# Patient Record
Sex: FEMALE | Race: WHITE | Employment: OTHER | URBAN - NONMETROPOLITAN AREA
[De-identification: names, ages, dates, MRNs, and addresses within clinical notes are randomized per-mention and may not be internally consistent; named-entity substitution may affect disease eponyms.]

---

## 2017-01-04 ENCOUNTER — CARE COORDINATION (OUTPATIENT)
Dept: CARE COORDINATION | Age: 72
End: 2017-01-04

## 2017-01-10 ENCOUNTER — CARE COORDINATION (OUTPATIENT)
Dept: CARE COORDINATION | Age: 72
End: 2017-01-10

## 2017-02-08 ENCOUNTER — CARE COORDINATION (OUTPATIENT)
Dept: CARE COORDINATION | Age: 72
End: 2017-02-08

## 2017-03-29 RX ORDER — MONTELUKAST SODIUM 10 MG/1
TABLET ORAL
Qty: 90 TABLET | Refills: 3 | Status: SHIPPED | OUTPATIENT
Start: 2017-03-29 | End: 2018-03-19 | Stop reason: SDUPTHER

## 2017-03-29 RX ORDER — LEVOTHYROXINE SODIUM 0.15 MG/1
TABLET ORAL
Qty: 90 TABLET | Refills: 3 | Status: SHIPPED | OUTPATIENT
Start: 2017-03-29 | End: 2018-03-19 | Stop reason: SDUPTHER

## 2017-03-29 RX ORDER — TEMAZEPAM 15 MG/1
15 CAPSULE ORAL NIGHTLY PRN
Qty: 90 CAPSULE | Refills: 1 | Status: SHIPPED | OUTPATIENT
Start: 2017-03-29 | End: 2017-12-05

## 2017-10-18 ENCOUNTER — OFFICE VISIT (OUTPATIENT)
Dept: PULMONOLOGY | Age: 72
End: 2017-10-18
Payer: MEDICARE

## 2017-10-18 VITALS
BODY MASS INDEX: 40.01 KG/M2 | WEIGHT: 264 LBS | SYSTOLIC BLOOD PRESSURE: 120 MMHG | RESPIRATION RATE: 16 BRPM | OXYGEN SATURATION: 98 % | HEIGHT: 68 IN | HEART RATE: 88 BPM | DIASTOLIC BLOOD PRESSURE: 80 MMHG

## 2017-10-18 DIAGNOSIS — G47.33 OSA ON CPAP: ICD-10-CM

## 2017-10-18 DIAGNOSIS — Z99.89 OSA ON CPAP: ICD-10-CM

## 2017-10-18 DIAGNOSIS — E66.01 MORBID OBESITY WITH BMI OF 40.0-44.9, ADULT (HCC): ICD-10-CM

## 2017-10-18 PROCEDURE — 99213 OFFICE O/P EST LOW 20 MIN: CPT | Performed by: PHYSICIAN ASSISTANT

## 2017-10-18 RX ORDER — MELOXICAM 7.5 MG/1
7.5 TABLET ORAL DAILY
COMMUNITY
End: 2018-05-16 | Stop reason: SDUPTHER

## 2017-10-18 NOTE — PROGRESS NOTES
[facility]      Seth Eng                                         [id number]  10/18/2017   Chief Complaint   Patient presents with    Sleep Apnea     ARTIS on CPAP-  15 mth f/u with D/L        Pt of Dr. Anh Greenberg    PAP Download:   Tamra Fajardo or initial  AHI: 27.9     Date of initial study: 3/22/16    [x] Compliant  100%   []  Noncompliant 0 %     PAP Type CPAP   Level  10 cmH2O   Avg Hrs/Day 8:52  AHI: 1.2   Recorded compliance dates ,9/18/17 to 10/17/17  Machine/Mfg: ResMed  Interface: Dream Wear    Provider:  [x][facility]  [][facility]  [][facility]  [][facility]         [][facility] []Other:   Neck Size: 16.75   Mallampati 4  ESS:  4    Here is a scan of the most recent download:        Presentation:   Ayla Jiang presents for sleep medicine follow up for obstructive sleep apnea  Since the last visit, yAla Jiang is doing reasonably well with their sleep machine. Other comments: She loves her machine. Equipment issues: The pressure is  acceptable, the mask is acceptable and she  is  using the humidity. Progress History:   Since last visit any new medical issues? Yes TKA  New ER or hospitlal visits? No  Any new or changes in medicines? No  Any new sleep medicines? No    Sleep issues:  Do you feel better? Yes  More rested? Yes   Better concentration?  yes      Past Medical History:   Diagnosis Date    Allergic rhinitis     Chronic kidney disease     Claustrophobia 2/24/2016    Hyperlipidemia     Obesity     ARTIS on CPAP        Past Surgical History:   Procedure Laterality Date    COLONOSCOPY  2006   Roger Golden Left     Dr Lesa Zavaleta  LMT       Social History   Substance Use Topics    Smoking status: Former Smoker     Types: Cigarettes    Smokeless tobacco: Never Used      Comment: quit 1995    Alcohol use Yes      Comment: socially       Allergies   Allergen Reactions    Naproxen Swelling       Current Outpatient Prescriptions   Medication Sig Dispense Refill    Triamcinolone Acetonide (NASACORT AQ NA) by Nasal route      meloxicam (MOBIC) 7.5 MG tablet Take 7.5 mg by mouth daily      montelukast (SINGULAIR) 10 MG tablet TAKE 1 TABLET BY MOUTH NIGHTLY 90 tablet 3    temazepam (RESTORIL) 15 MG capsule Take 1 capsule by mouth nightly as needed for Sleep 90 capsule 1    levothyroxine (SYNTHROID) 150 MCG tablet TAKE 1 TABLET BY MOUTH EVERY DAY 90 tablet 3    Fluocinolone Acetonide (DERMA-SMOOTHE/FS BODY) 0.01 % OIL Apply 5 gtts BID both ears (Patient taking differently: as needed Apply 5 gtts BID both ears) 1 Bottle 2    benzonatate (TESSALON PERLES) 100 MG capsule Take 1 capsule by mouth 3 times daily as needed for Cough 30 capsule 0    vitamin B-12 (CYANOCOBALAMIN) 500 MCG tablet Take 500 mcg by mouth daily Indications: 2500mg a day      Niacin (VITAMIN B-3 PO) Take by mouth      Omega-3 Fatty Acids (FISH OIL) 1200 MG CAPS Take by mouth      Magnesium 500 MG CAPS Take by mouth      Cholecalciferol (VITAMIN D3) 83206 UNITS CAPS Take 10,000 Units by mouth daily.  Misc Natural Product Nasal (PONARIS) SOLN by Nasal route.  therapeutic multivitamin-minerals (THERAGRAN-M) tablet Take 1 tablet by mouth daily.  aspirin 325 MG tablet Take 325 mg by mouth daily      rivaroxaban (XARELTO) 10 MG TABS tablet Take 10 mg by mouth daily      HYDROcodone-acetaminophen (NORCO) 5-325 MG per tablet Take 1 tablet by mouth every 6 hours as needed for Pain .  traMADol (ULTRAM) 50 MG tablet Take 1 tablet by mouth nightly as needed for Pain 15 tablet 0    vitamin E 400 UNIT capsule Take 400 Units by mouth daily      ibuprofen (ADVIL;MOTRIN) 200 MG tablet Take 400 mg by mouth every 8 hours as needed. No current facility-administered medications for this visit.         Family History   Problem Relation Age of Onset    Dementia Mother     Diabetes Father         Review of Systems -   General ROS: gained 8 lbs over 1 year  ENT ROS: negative for - nasal congestion, oral lesions or sore throat  Hematological and Lymphatic ROS: negative  Endocrine ROS: negative  Respiratory ROS: no cough, shortness of breath, or wheezing  Cardiovascular ROS: no chest pain or dyspnea on exertion  Gastrointestinal ROS: no abdominal pain, change in bowel habits, or black or bloody stools  Musculoskeletal ROS: negative  Neurological ROS: negative    Physical Exam:    BMI:  Body mass index is 40.74 kg/m². Wt Readings from Last 3 Encounters:   10/18/17 264 lb (119.7 kg)   11/21/16 256 lb 12.8 oz (116.5 kg)   07/22/16 251 lb (113.9 kg)     Vitals: /80   Pulse 88   Resp 16   Ht 5' 7.5\" (1.715 m)   Wt 264 lb (119.7 kg)   LMP  (Exact Date)   SpO2 98% Comment: R/A at rest  BMI 40.74 kg/m²       General appearance: alert and oriented to person, place and time, well-developed and well-nourished, in no acute distress  Nose: Nares normal. Septum midline. Mucosa normal. No drainage or sinus tenderness. Oropharynx:  negative  Lungs: clear to auscultation bilaterally  Heart: regular rate and rhythm, S1, S2 normal, no murmur, click, rub or gallop  Extremities: extremities normal, atraumatic, no cyanosis or edema  Neurologic: Mental status: Alert, oriented, thought content appropriate      ASSESSMENT/DIAGNOSIS    1. ARTIS on CPAP              Plan   Do you need any equipment today? Yes update    - She  was advised to continue current positive airway pressure therapy with above described pressure. - She  advised to keep good compliance with current recommended pressure to get optimal results and clinical improvement  - Recommend 7-9 hours of sleep with PAP  - She was advised to call [employer] regarding supplies if needed. - She call my office for earlier appointment if needed for worsening of sleep symptoms.   - She was instructed on weight loss  - Jacob Gamez was educated about my impression and plan. Patient verbalizes understanding.   We will see Jacob Orellana back in: 1 year with download    Maricel Torres

## 2017-12-05 ENCOUNTER — OFFICE VISIT (OUTPATIENT)
Dept: SURGERY | Age: 72
End: 2017-12-05

## 2017-12-05 VITALS
TEMPERATURE: 98.8 F | BODY MASS INDEX: 37.89 KG/M2 | HEART RATE: 86 BPM | OXYGEN SATURATION: 93 % | SYSTOLIC BLOOD PRESSURE: 124 MMHG | HEIGHT: 68 IN | WEIGHT: 250 LBS | RESPIRATION RATE: 20 BRPM | DIASTOLIC BLOOD PRESSURE: 66 MMHG

## 2017-12-05 DIAGNOSIS — Z12.11 COLON CANCER SCREENING: Primary | ICD-10-CM

## 2017-12-05 PROCEDURE — PREOPEXAM PRE-OP EXAM: Performed by: SURGERY

## 2017-12-05 ASSESSMENT — ENCOUNTER SYMPTOMS
VOICE CHANGE: 0
EYE REDNESS: 0
WHEEZING: 0
DIARRHEA: 0
SINUS PRESSURE: 0
BLOOD IN STOOL: 0
PHOTOPHOBIA: 0
EYE PAIN: 0
TROUBLE SWALLOWING: 0
CHOKING: 0
VOMITING: 0
RECTAL PAIN: 0
RHINORRHEA: 0
BACK PAIN: 0
EYE DISCHARGE: 0
FACIAL SWELLING: 0
SHORTNESS OF BREATH: 0
SORE THROAT: 0
ANAL BLEEDING: 0
NAUSEA: 0
EYE ITCHING: 0
ABDOMINAL PAIN: 0
ABDOMINAL DISTENTION: 0
COUGH: 0
CHEST TIGHTNESS: 0
COLOR CHANGE: 0
APNEA: 0
CONSTIPATION: 0
STRIDOR: 0

## 2017-12-05 NOTE — LETTER
[facility]  Luann Puente. Carlota GennyAlfonso  Phone- [phone]  Fax [phone]    Pt Name: Rolo Tran  Medical Record Number: [id number]  Date of Birth 1945   Today's Date: 12/5/2017    Jasmin Cheng was evaluated in the office today. My assessment and plans are listed below. ASSESSMENT        1. Colon cancer screening  COLONOSCOPY W/ OR W/O BIOPSY     Past Medical History:   Diagnosis Date    Allergic rhinitis     Chronic kidney disease     Claustrophobia 2/24/2016    Hyperlipidemia     Obesity     ARTIS on CPAP          PLANS:      1. Schedule Eva for colonoscopy with possible biopsy/polypectomy  2. Potential diagnostic/therapeutic options and alternatives were discussed with the patient in the office. Potential risks of bleeding, infection, perforation and missed lesions was reviewed. Potential for biopsy and/or polypectomy was discussed. We also discussed the use of IV sedation and colon preparation instructions. The patient was given an opportunity to ask questions. Once answered, the patient was agreeable to proceed with the procedure. 3. Status: Outpatient in endoscopy  4. Planned anesthesia: IV sedation provided by myself  5. Perioperative bowel preparation with Miralax and Dulcolax. Instructions given and questions answered. If I can provide any additional assistance or you have any concerns, please feel free to contact me. Thank you for allowing to participate in the care of your patients. Sincerely,      Luann Puente.  Alfonso Malagon

## 2017-12-05 NOTE — PROGRESS NOTES
Subjective:      Patient ID: Brandin Martin is a 71 y.o. female. Chief Complaint   Patient presents with    Surgical Consult     Est Pt- Colonoscopy Screening- Last one 2007       HPI    Review of Systems   Constitutional: Negative for activity change, appetite change, chills, diaphoresis, fatigue, fever and unexpected weight change. HENT: Negative for congestion, dental problem, drooling, ear discharge, ear pain, facial swelling, hearing loss, mouth sores, nosebleeds, postnasal drip, rhinorrhea, sinus pressure, sneezing, sore throat, tinnitus, trouble swallowing and voice change. Eyes: Negative for photophobia, pain, discharge, redness, itching and visual disturbance. Respiratory: Negative for apnea, cough, choking, chest tightness, shortness of breath, wheezing and stridor. Cardiovascular: Negative for chest pain, palpitations and leg swelling. Gastrointestinal: Negative for abdominal distention, abdominal pain, anal bleeding, blood in stool, constipation, diarrhea, nausea, rectal pain and vomiting. Endocrine: Negative for cold intolerance, heat intolerance, polydipsia, polyphagia and polyuria. Genitourinary: Negative for decreased urine volume, difficulty urinating, dysuria, enuresis, flank pain, frequency, genital sores, hematuria and urgency. Musculoskeletal: Negative for arthralgias, back pain, gait problem, joint swelling, myalgias, neck pain and neck stiffness. Skin: Negative for color change, pallor, rash and wound. Allergic/Immunologic: Negative for environmental allergies, food allergies and immunocompromised state. Neurological: Negative for dizziness, tremors, seizures, syncope, facial asymmetry, speech difficulty, weakness, light-headedness, numbness and headaches. Hematological: Negative for adenopathy. Does not bruise/bleed easily.    Psychiatric/Behavioral: Negative for agitation, behavioral problems, confusion, decreased concentration, dysphoric mood, hallucinations,

## 2017-12-05 NOTE — PROGRESS NOTES
Review of Systems  Constitutional: Negative for activity change, appetite change, chills, diaphoresis, fatigue, fever and unexpected weight change. HENT: Negative for congestion, dental problem, drooling, ear discharge, ear pain, facial swelling, hearing loss, mouth sores, nosebleeds, postnasal drip, rhinorrhea, sinus pressure, sneezing, sore throat, tinnitus, trouble swallowing and voice change. Eyes: Negative for photophobia, pain, discharge, redness, itching and visual disturbance. Respiratory: Negative for apnea, cough, choking, chest tightness, shortness of breath, wheezing and stridor. Cardiovascular: Negative for chest pain, palpitations and leg swelling. Gastrointestinal: Negative for abdominal distention, abdominal pain, anal bleeding, blood in stool, constipation, diarrhea, nausea, rectal pain and vomiting. Endocrine: Negative for cold intolerance, heat intolerance, polydipsia, polyphagia and polyuria. Genitourinary: Negative for decreased urine volume, difficulty urinating, dysuria, enuresis, flank pain, frequency, genital sores, hematuria and urgency. Musculoskeletal: Negative for arthralgias, back pain, gait problem, joint swelling, myalgias, neck pain and neck stiffness. Skin: Negative for color change, pallor, rash and wound. Allergic/Immunologic: Negative for environmental allergies, food allergies and immunocompromised state. Neurological: Negative for dizziness, tremors, seizures, syncope, facial asymmetry, speech difficulty, weakness, light-headedness, numbness and headaches. Hematological: Negative for adenopathy. Does not bruise/bleed easily. Psychiatric/Behavioral: Negative for agitation, behavioral problems, confusion, decreased concentration, dysphoric mood, hallucinations, self-injury, sleep disturbance and suicidal ideas. The patient is not nervous/anxious and is not hyperactive.       OBJECTIVE    VITALS:  height is 5' 7.5\" (1.715 m) and weight is 250 lb (113.4 kg). Her tympanic temperature is 98.8 °F (37.1 °C). Her blood pressure is 124/66 and her pulse is 86. Her respiration is 20 and oxygen saturation is 93%. CONSTITUTIONAL: Alert and oriented times 3, no acute distress and cooperative to examination with proper mood and affect. SKIN: Skin color, texture, turgor normal. No rashes or lesions. LYMPH: no cervical nodes, no inguinal nodes  HEENT: Head is normocephalic, atraumatic. EOMI, PERRLA. NECK: Supple, symmetrical, trachea midline, no adenopathy, thyroid symmetric, not enlarged and no tenderness, skin normal.  CHEST/LUNGS: chest symmetric with normal A/P diameter, normal respiratory rate and rhythm, lungs clear to auscultation without wheezes, rales or rhonchi. No accessory muscle use. Scars None   CARDIOVASCULAR: Heart sounds are normal.  Regular rate and rhythm without murmur, gallop or rub. Normal S1 and S2. Carotid and femoral pulses 2+/4 and equal bilaterally. ABDOMEN: Normal shape. No scar(s) present. Normal bowel sounds. No bruits. soft, nontender, nondistended, no masses or organomegaly. no evidence of hernia. Percussion: Normal without hepatosplenomegally. RECTAL: deferred, not clinically indicated  NEUROLOGIC: There are no focalizing motor or sensory deficits. CN II-XII are grossly intact. Ceclia Marcello EXTREMITIES: no cyanosis, no clubbing and no edema. Thank you for the interesting evaluation. Further recommendations as listed above.        Electronically signed by Thanh Peter DO on 12/5/2017 at 11:15 AM

## 2017-12-07 ENCOUNTER — OFFICE VISIT (OUTPATIENT)
Dept: FAMILY MEDICINE CLINIC | Age: 72
End: 2017-12-07
Payer: MEDICARE

## 2017-12-07 VITALS
RESPIRATION RATE: 20 BRPM | HEART RATE: 92 BPM | BODY MASS INDEX: 40.38 KG/M2 | WEIGHT: 266.4 LBS | DIASTOLIC BLOOD PRESSURE: 76 MMHG | TEMPERATURE: 97.6 F | SYSTOLIC BLOOD PRESSURE: 122 MMHG | HEIGHT: 68 IN

## 2017-12-07 DIAGNOSIS — R05.8 UPPER AIRWAY COUGH SYNDROME: Primary | ICD-10-CM

## 2017-12-07 PROCEDURE — 99213 OFFICE O/P EST LOW 20 MIN: CPT | Performed by: NURSE PRACTITIONER

## 2017-12-07 RX ORDER — HYDROCODONE POLISTIREX AND CHLORPHENIRAMINE POLISTIREX 10; 8 MG/5ML; MG/5ML
5 SUSPENSION, EXTENDED RELEASE ORAL EVERY 12 HOURS PRN
Qty: 118 ML | Refills: 0 | Status: SHIPPED | OUTPATIENT
Start: 2017-12-07 | End: 2018-05-16

## 2017-12-07 ASSESSMENT — ENCOUNTER SYMPTOMS
RHINORRHEA: 0
COUGH: 0
NAUSEA: 0
SHORTNESS OF BREATH: 0
ABDOMINAL PAIN: 0

## 2017-12-07 ASSESSMENT — PATIENT HEALTH QUESTIONNAIRE - PHQ9
2. FEELING DOWN, DEPRESSED OR HOPELESS: 0
SUM OF ALL RESPONSES TO PHQ QUESTIONS 1-9: 0
SUM OF ALL RESPONSES TO PHQ9 QUESTIONS 1 & 2: 0
1. LITTLE INTEREST OR PLEASURE IN DOING THINGS: 0

## 2017-12-25 ENCOUNTER — PATIENT MESSAGE (OUTPATIENT)
Dept: FAMILY MEDICINE CLINIC | Age: 72
End: 2017-12-25

## 2017-12-25 DIAGNOSIS — R05.8 POST-VIRAL COUGH SYNDROME: Primary | ICD-10-CM

## 2017-12-26 RX ORDER — PREDNISONE 50 MG/1
50 TABLET ORAL DAILY
Qty: 5 TABLET | Refills: 0 | Status: SHIPPED | OUTPATIENT
Start: 2017-12-26 | End: 2017-12-31

## 2018-01-08 ENCOUNTER — HOSPITAL ENCOUNTER (OUTPATIENT)
Age: 73
Setting detail: OUTPATIENT SURGERY
Discharge: HOME | End: 2018-01-08
Attending: SURGERY | Admitting: SURGERY
Payer: MEDICARE

## 2018-01-08 VITALS
TEMPERATURE: 98.1 F | OXYGEN SATURATION: 96 % | HEART RATE: 85 BPM | RESPIRATION RATE: 16 BRPM | DIASTOLIC BLOOD PRESSURE: 79 MMHG | SYSTOLIC BLOOD PRESSURE: 125 MMHG

## 2018-01-08 PROCEDURE — 7100000000 HC PACU RECOVERY - FIRST 15 MIN: Performed by: SURGERY

## 2018-01-08 PROCEDURE — G0121 COLON CA SCRN NOT HI RSK IND: HCPCS | Performed by: SURGERY

## 2018-01-08 PROCEDURE — 2580000003 HC RX 258: Performed by: SURGERY

## 2018-01-08 PROCEDURE — 99153 MOD SED SAME PHYS/QHP EA: CPT | Performed by: SURGERY

## 2018-01-08 PROCEDURE — 3609027000 HC COLONOSCOPY: Performed by: SURGERY

## 2018-01-08 PROCEDURE — 99152 MOD SED SAME PHYS/QHP 5/>YRS: CPT | Performed by: SURGERY

## 2018-01-08 PROCEDURE — 6360000002 HC RX W HCPCS: Performed by: SURGERY

## 2018-01-08 RX ORDER — 0.9 % SODIUM CHLORIDE 0.9 %
10 VIAL (ML) INJECTION EVERY 12 HOURS SCHEDULED
Status: DISCONTINUED | OUTPATIENT
Start: 2018-01-08 | End: 2018-01-08 | Stop reason: HOSPADM

## 2018-01-08 RX ORDER — FENTANYL CITRATE 50 UG/ML
INJECTION, SOLUTION INTRAMUSCULAR; INTRAVENOUS PRN
Status: DISCONTINUED | OUTPATIENT
Start: 2018-01-08 | End: 2018-01-08 | Stop reason: HOSPADM

## 2018-01-08 RX ORDER — 0.9 % SODIUM CHLORIDE 0.9 %
10 VIAL (ML) INJECTION PRN
Status: DISCONTINUED | OUTPATIENT
Start: 2018-01-08 | End: 2018-01-08 | Stop reason: HOSPADM

## 2018-01-08 RX ORDER — MIDAZOLAM HYDROCHLORIDE 1 MG/ML
INJECTION INTRAMUSCULAR; INTRAVENOUS PRN
Status: DISCONTINUED | OUTPATIENT
Start: 2018-01-08 | End: 2018-01-08 | Stop reason: HOSPADM

## 2018-01-08 RX ORDER — SODIUM CHLORIDE 450 MG/100ML
INJECTION, SOLUTION INTRAVENOUS CONTINUOUS
Status: DISCONTINUED | OUTPATIENT
Start: 2018-01-08 | End: 2018-01-08 | Stop reason: HOSPADM

## 2018-01-08 RX ADMIN — SODIUM CHLORIDE: 4.5 INJECTION, SOLUTION INTRAVENOUS at 09:33

## 2018-01-08 NOTE — OP NOTE
Jake Estrada 61  RECORD OF COLONOSCOPY  PATIENT NAME: [address] [place] [address] RECORD NO. [id number]  SURGEON: Zina Oppenheim. SULEMA Malagon  PRIMARY CARE PHYSICIAN: Ryan Arias DO     PROCEDURE PERFORMED:  1/8/2018   PREOPERATIVE DIAGNOSIS:  colon cancer screening  POSTOPERATIVE DIAGNOSIS:  Diverticulosis  PROCEDURE PERFORMED:  Colonoscopy   ANESTHESIA:  IV sedation with 100 mcg Fentanyl and 10 mg Versed  ESTIMATED BLOOD LOSS:  None. SPECIMENS: None  COMPLICATIONS:  None immediately appreciated. DISCUSSION:  [facility] is a 71y.o.-year-old female who presented to my office and seen in evaluation at request of Ryan Arias DO. After history and physical examination was performed, potential diagnostic and therapeutic modalities discussed with the patient. Radiographic and endoscopic studies were discussed, risks, complications, benefits reviewed. She was given opportunity to ask questions, once answeredinformed consent was obtained. The patient was the Endoscopy Suite on 1/8/2018 for procedure. OPERATIVE FINDINGS:  At the time of endoscopy good prep appreciated. Scope was able to be advanced to level of cecum. There was sigmoid diverticulosis without diverticulitis, otherwise unremarkable. PROCEDURE:  The patient was brought to endoscopy suite, placed in left lateral Daniel position, placed under continuous cardiac telemetry, blood pressure, pulse oximetry monitoring, placed under IV sedation with medications noted above, done in incremental fashion to achieve sedation. Digital rectal exam performed revealing adequate rectal tone, no masses palpable. Colonoscope advanced to rectum, rectum gently insufflated. Under direct visualization colonoscope was advanced entirety of colon to level of cecum, confirmed by ileocecal valve, triangle folds, appendiceal orifice. The scope was slowly retracted, intraluminal structures inspected.  The scope was retracted through the cecum, ascending

## 2018-01-12 DIAGNOSIS — Z12.11 COLON CANCER SCREENING: ICD-10-CM

## 2018-03-19 DIAGNOSIS — Z91.09 ENVIRONMENTAL ALLERGIES: ICD-10-CM

## 2018-03-19 DIAGNOSIS — E03.9 HYPOTHYROIDISM, UNSPECIFIED TYPE: ICD-10-CM

## 2018-03-19 DIAGNOSIS — E78.5 HYPERLIPIDEMIA WITH TARGET LDL LESS THAN 100: Primary | ICD-10-CM

## 2018-03-19 RX ORDER — MONTELUKAST SODIUM 10 MG/1
TABLET ORAL
Qty: 90 TABLET | Refills: 3 | Status: SHIPPED | OUTPATIENT
Start: 2018-03-19 | End: 2020-07-27 | Stop reason: SDUPTHER

## 2018-03-19 RX ORDER — LEVOTHYROXINE SODIUM 0.15 MG/1
TABLET ORAL
Qty: 90 TABLET | Refills: 0 | Status: SHIPPED | OUTPATIENT
Start: 2018-03-19 | End: 2018-06-17 | Stop reason: SDUPTHER

## 2018-05-15 ENCOUNTER — HOSPITAL ENCOUNTER (OUTPATIENT)
Age: 73
Discharge: HOME | End: 2018-05-15
Payer: MEDICARE

## 2018-05-15 DIAGNOSIS — E78.5 HYPERLIPIDEMIA WITH TARGET LDL LESS THAN 100: ICD-10-CM

## 2018-05-15 DIAGNOSIS — E03.9 HYPOTHYROIDISM, UNSPECIFIED TYPE: ICD-10-CM

## 2018-05-15 LAB
ALBUMIN SERPL-MCNC: 4 G/DL (ref 3.5–5.1)
ALP BLD-CCNC: 49 U/L (ref 38–126)
ALT SERPL-CCNC: 14 U/L (ref 11–66)
ANION GAP SERPL CALCULATED.3IONS-SCNC: 12 MEQ/L (ref 8–16)
AST SERPL-CCNC: 21 U/L (ref 5–40)
BILIRUB SERPL-MCNC: 0.4 MG/DL (ref 0.3–1.2)
BUN BLDV-MCNC: 18 MG/DL (ref 7–22)
CALCIUM SERPL-MCNC: 9.3 MG/DL (ref 8.5–10.5)
CHLORIDE BLD-SCNC: 101 MEQ/L (ref 98–111)
CHOLESTEROL, TOTAL: 177 MG/DL (ref 100–199)
CO2: 28 MEQ/L (ref 23–33)
CREAT SERPL-MCNC: 0.6 MG/DL (ref 0.4–1.2)
GFR SERPL CREATININE-BSD FRML MDRD: > 90 ML/MIN/1.73M2
GLUCOSE BLD-MCNC: 103 MG/DL (ref 70–108)
HCT VFR BLD CALC: 43.1 % (ref 37–47)
HDLC SERPL-MCNC: 49 MG/DL
HEMOGLOBIN: 14.2 GM/DL (ref 12–16)
LDL CHOLESTEROL CALCULATED: 105 MG/DL
MCH RBC QN AUTO: 31.1 PG (ref 27–31)
MCHC RBC AUTO-ENTMCNC: 33 GM/DL (ref 33–37)
MCV RBC AUTO: 94.5 FL (ref 81–99)
PDW BLD-RTO: 13.2 % (ref 11.5–14.5)
PLATELET # BLD: 215 THOU/MM3 (ref 130–400)
PMV BLD AUTO: 9.9 FL (ref 7.4–10.4)
POTASSIUM SERPL-SCNC: 4.1 MEQ/L (ref 3.5–5.2)
RBC # BLD: 4.56 MILL/MM3 (ref 4.2–5.4)
SODIUM BLD-SCNC: 141 MEQ/L (ref 135–145)
TOTAL PROTEIN: 7.2 G/DL (ref 6.1–8)
TRIGL SERPL-MCNC: 116 MG/DL (ref 0–199)
TSH SERPL DL<=0.05 MIU/L-ACNC: 1.11 UIU/ML (ref 0.4–4.2)
WBC # BLD: 6 THOU/MM3 (ref 4.8–10.8)

## 2018-05-15 PROCEDURE — 80053 COMPREHEN METABOLIC PANEL: CPT

## 2018-05-15 PROCEDURE — 85027 COMPLETE CBC AUTOMATED: CPT

## 2018-05-15 PROCEDURE — 80061 LIPID PANEL: CPT

## 2018-05-15 PROCEDURE — 84443 ASSAY THYROID STIM HORMONE: CPT

## 2018-05-15 PROCEDURE — 36415 COLL VENOUS BLD VENIPUNCTURE: CPT

## 2018-05-16 ENCOUNTER — OFFICE VISIT (OUTPATIENT)
Dept: FAMILY MEDICINE CLINIC | Age: 73
End: 2018-05-16
Payer: MEDICARE

## 2018-05-16 VITALS
RESPIRATION RATE: 14 BRPM | DIASTOLIC BLOOD PRESSURE: 76 MMHG | SYSTOLIC BLOOD PRESSURE: 122 MMHG | WEIGHT: 260 LBS | BODY MASS INDEX: 39.4 KG/M2 | HEIGHT: 68 IN | HEART RATE: 84 BPM | OXYGEN SATURATION: 97 %

## 2018-05-16 DIAGNOSIS — G47.00 INSOMNIA, UNSPECIFIED TYPE: ICD-10-CM

## 2018-05-16 DIAGNOSIS — G47.33 OSA ON CPAP: ICD-10-CM

## 2018-05-16 DIAGNOSIS — E78.5 HYPERLIPIDEMIA WITH TARGET LDL LESS THAN 100: ICD-10-CM

## 2018-05-16 DIAGNOSIS — Z91.09 ENVIRONMENTAL ALLERGIES: ICD-10-CM

## 2018-05-16 DIAGNOSIS — Z78.0 POST-MENOPAUSAL: ICD-10-CM

## 2018-05-16 DIAGNOSIS — E03.9 HYPOTHYROIDISM, UNSPECIFIED TYPE: Primary | ICD-10-CM

## 2018-05-16 DIAGNOSIS — E66.01 MORBID OBESITY (HCC): ICD-10-CM

## 2018-05-16 DIAGNOSIS — E66.01 MORBID OBESITY WITH BMI OF 40.0-44.9, ADULT (HCC): ICD-10-CM

## 2018-05-16 DIAGNOSIS — Z99.89 OSA ON CPAP: ICD-10-CM

## 2018-05-16 PROCEDURE — 99397 PER PM REEVAL EST PAT 65+ YR: CPT | Performed by: FAMILY MEDICINE

## 2018-05-16 RX ORDER — TEMAZEPAM 15 MG/1
15 CAPSULE ORAL NIGHTLY PRN
COMMUNITY
End: 2018-05-16 | Stop reason: SDUPTHER

## 2018-05-16 RX ORDER — TEMAZEPAM 15 MG/1
15 CAPSULE ORAL NIGHTLY PRN
Qty: 90 CAPSULE | Refills: 0 | Status: SHIPPED | OUTPATIENT
Start: 2018-05-16 | End: 2018-08-29 | Stop reason: SDUPTHER

## 2018-05-16 RX ORDER — MELOXICAM 7.5 MG/1
7.5 TABLET ORAL DAILY
Qty: 90 TABLET | Refills: 3 | Status: SHIPPED | OUTPATIENT
Start: 2018-05-16 | End: 2018-12-26 | Stop reason: ALTCHOICE

## 2018-05-16 ASSESSMENT — ENCOUNTER SYMPTOMS
RESPIRATORY NEGATIVE: 1
GASTROINTESTINAL NEGATIVE: 1

## 2018-06-04 ENCOUNTER — HOSPITAL ENCOUNTER (OUTPATIENT)
Dept: PHYSICAL THERAPY | Age: 73
Setting detail: THERAPIES SERIES
Discharge: HOME | End: 2018-06-04
Payer: MEDICARE

## 2018-06-04 PROCEDURE — G8990 OTHER PT/OT CURRENT STATUS: HCPCS

## 2018-06-04 PROCEDURE — G8991 OTHER PT/OT GOAL STATUS: HCPCS

## 2018-06-04 PROCEDURE — 97161 PT EVAL LOW COMPLEX 20 MIN: CPT

## 2018-06-04 ASSESSMENT — PAIN DESCRIPTION - ORIENTATION: ORIENTATION: LEFT

## 2018-06-04 ASSESSMENT — PAIN DESCRIPTION - PAIN TYPE: TYPE: CHRONIC PAIN

## 2018-06-04 ASSESSMENT — PAIN DESCRIPTION - LOCATION: LOCATION: KNEE

## 2018-06-04 ASSESSMENT — PAIN DESCRIPTION - DESCRIPTORS: DESCRIPTORS: CONSTANT

## 2018-06-04 ASSESSMENT — PAIN SCALES - GENERAL: PAINLEVEL_OUTOF10: 4

## 2018-06-17 DIAGNOSIS — E03.9 HYPOTHYROIDISM, UNSPECIFIED TYPE: ICD-10-CM

## 2018-06-18 RX ORDER — LEVOTHYROXINE SODIUM 0.15 MG/1
TABLET ORAL
Qty: 90 TABLET | Refills: 0 | Status: SHIPPED | OUTPATIENT
Start: 2018-06-18 | End: 2018-09-16 | Stop reason: SDUPTHER

## 2018-06-20 ENCOUNTER — OFFICE VISIT (OUTPATIENT)
Dept: FAMILY MEDICINE CLINIC | Age: 73
End: 2018-06-20
Payer: MEDICARE

## 2018-06-20 VITALS
HEART RATE: 76 BPM | DIASTOLIC BLOOD PRESSURE: 80 MMHG | BODY MASS INDEX: 41.44 KG/M2 | HEIGHT: 67 IN | WEIGHT: 264 LBS | RESPIRATION RATE: 16 BRPM | SYSTOLIC BLOOD PRESSURE: 132 MMHG | OXYGEN SATURATION: 96 %

## 2018-06-20 DIAGNOSIS — L25.5 RHUS DERMATITIS: Primary | ICD-10-CM

## 2018-06-20 DIAGNOSIS — T30.4 CHEMICAL BURN: ICD-10-CM

## 2018-06-20 PROCEDURE — 99213 OFFICE O/P EST LOW 20 MIN: CPT | Performed by: FAMILY MEDICINE

## 2018-06-20 ASSESSMENT — ENCOUNTER SYMPTOMS
RESPIRATORY NEGATIVE: 1
GASTROINTESTINAL NEGATIVE: 1

## 2018-06-22 ENCOUNTER — OFFICE VISIT (OUTPATIENT)
Dept: CARDIOLOGY CLINIC | Age: 73
End: 2018-06-22
Payer: MEDICARE

## 2018-06-22 VITALS
DIASTOLIC BLOOD PRESSURE: 70 MMHG | SYSTOLIC BLOOD PRESSURE: 136 MMHG | BODY MASS INDEX: 40.01 KG/M2 | WEIGHT: 264 LBS | HEIGHT: 68 IN | HEART RATE: 80 BPM

## 2018-06-22 DIAGNOSIS — Z99.89 OSA ON CPAP: Primary | ICD-10-CM

## 2018-06-22 DIAGNOSIS — G47.33 OSA ON CPAP: Primary | ICD-10-CM

## 2018-06-22 DIAGNOSIS — E78.5 HYPERLIPIDEMIA WITH TARGET LDL LESS THAN 100: ICD-10-CM

## 2018-06-22 PROCEDURE — 99203 OFFICE O/P NEW LOW 30 MIN: CPT | Performed by: NUCLEAR MEDICINE

## 2018-06-22 PROCEDURE — 93000 ELECTROCARDIOGRAM COMPLETE: CPT | Performed by: NUCLEAR MEDICINE

## 2018-06-22 ASSESSMENT — ENCOUNTER SYMPTOMS
BLOOD IN STOOL: 0
CHEST TIGHTNESS: 0
VOMITING: 0
ABDOMINAL PAIN: 0
ABDOMINAL DISTENTION: 0
ANAL BLEEDING: 0
CONSTIPATION: 0
DIARRHEA: 0
NAUSEA: 0
SHORTNESS OF BREATH: 1
PHOTOPHOBIA: 0
BACK PAIN: 0
RECTAL PAIN: 0

## 2018-08-29 ENCOUNTER — PATIENT MESSAGE (OUTPATIENT)
Dept: FAMILY MEDICINE CLINIC | Age: 73
End: 2018-08-29

## 2018-08-29 ENCOUNTER — TELEPHONE (OUTPATIENT)
Dept: FAMILY MEDICINE CLINIC | Age: 73
End: 2018-08-29

## 2018-08-29 DIAGNOSIS — G47.00 INSOMNIA, UNSPECIFIED TYPE: ICD-10-CM

## 2018-08-29 RX ORDER — TEMAZEPAM 15 MG/1
15 CAPSULE ORAL NIGHTLY PRN
Qty: 90 CAPSULE | Refills: 0 | Status: SHIPPED | OUTPATIENT
Start: 2018-08-29 | End: 2018-11-27

## 2018-08-30 NOTE — TELEPHONE ENCOUNTER
KARINA murray spoke with [facility]. Patient notified via detailed VM KORTNEY Capps.

## 2018-09-16 DIAGNOSIS — E03.9 HYPOTHYROIDISM, UNSPECIFIED TYPE: ICD-10-CM

## 2018-09-17 RX ORDER — LEVOTHYROXINE SODIUM 0.15 MG/1
TABLET ORAL
Qty: 90 TABLET | Refills: 0 | Status: SHIPPED | OUTPATIENT
Start: 2018-09-17 | End: 2018-12-15 | Stop reason: SDUPTHER

## 2018-12-03 ENCOUNTER — OFFICE VISIT (OUTPATIENT)
Dept: PULMONOLOGY | Age: 73
End: 2018-12-03
Payer: MEDICARE

## 2018-12-03 VITALS
HEIGHT: 68 IN | HEART RATE: 76 BPM | BODY MASS INDEX: 40.59 KG/M2 | OXYGEN SATURATION: 96 % | SYSTOLIC BLOOD PRESSURE: 128 MMHG | DIASTOLIC BLOOD PRESSURE: 78 MMHG | WEIGHT: 267.8 LBS

## 2018-12-03 DIAGNOSIS — G47.33 OSA ON CPAP: Primary | ICD-10-CM

## 2018-12-03 DIAGNOSIS — Z99.89 OSA ON CPAP: Primary | ICD-10-CM

## 2018-12-03 DIAGNOSIS — E66.01 MORBID OBESITY WITH BMI OF 40.0-44.9, ADULT (HCC): ICD-10-CM

## 2018-12-03 PROCEDURE — 99213 OFFICE O/P EST LOW 20 MIN: CPT | Performed by: PHYSICIAN ASSISTANT

## 2018-12-03 RX ORDER — CELECOXIB 200 MG/1
200 CAPSULE ORAL 2 TIMES DAILY
COMMUNITY
End: 2018-12-26 | Stop reason: SINTOL

## 2018-12-03 ASSESSMENT — ENCOUNTER SYMPTOMS
COUGH: 0
NAUSEA: 0
BACK PAIN: 0
CHEST TIGHTNESS: 0
SHORTNESS OF BREATH: 0
STRIDOR: 0
WHEEZING: 0
ALLERGIC/IMMUNOLOGIC NEGATIVE: 1
DIARRHEA: 0
EYES NEGATIVE: 1

## 2018-12-03 NOTE — PROGRESS NOTES
[facility]      Jarvis Love         [id number]  12/3/2018   Chief Complaint   Patient presents with    Sleep Apnea     1 year f/u with lyle         Pt of Dr. Henna Arias    PAP Download:   Original or initialAHI: 27.9     Date of initial study: 3/22/16     [x] Compliant  100%   []  Noncompliant 0 %     PAP Type cpapLevel  10   Avg Hrs/Day [id number]  AHI: 1.5   Recorded compliance dates , 10/28/18  to 11/26/18   Machine/Mfg: Resmed Interface: nasal     Provider:  [][facility]  [][facility]  [][facility]  [x][facility]         [][facility] []Other:   Neck Size: 16.75  Mallampati 4  ESS:      Here is a scan of the most recent download:      Presentation:   Ryan Blas presents for sleep medicine follow up for obstructive sleep apnea  Since the last visit, Ryan Blas is doing well with PAP. She feels better. Equipment issues: The pressure is  acceptable, the mask is acceptable and she  is  using the humidity. Sleep issues:  Do you feel better? Yes  More rested? Yes   Better concentration? yes    Progress History:   Since last visit any new medical issues? No  New ER or hospitlal visits? No  Any new or changes in medicines? No  Any new sleep medicines?  No        Past Medical History:   Diagnosis Date    Allergic rhinitis     Chronic kidney disease     Claustrophobia 2/24/2016    Hyperlipidemia     Obesity     ARTIS on CPAP        Past Surgical History:   Procedure Laterality Date    COLONOSCOPY  2007    [facility]    KNEE SURGERY Left 2016    Dr Christiano Falcon NOT  W [address] [place] [address] Left 1/8/2018    COLONOSCOPY SCREENING performed by Joseluis England DO at [facility]       Social History   Substance Use Topics    Smoking status: Former Smoker     Packs/day: 0.75     Years: 5.00     Types: Cigarettes     Quit date: 5/1/1996    Smokeless tobacco: Never Used      Comment: quit 1995    Alcohol use Yes      Comment: socially       Allergies   Allergen Reactions    Naproxen Swelling       Current Outpatient Prescriptions   Medication Sig Dispense Refill    celecoxib (CELEBREX) 200 MG capsule Take 200 mg by mouth 2 times daily      levothyroxine (SYNTHROID) 150 MCG tablet TAKE 1 TABLET DAILY 90 tablet 0    fluocinonide (LIDEX) 0.05 % cream Apply topically 2 times daily. 15 g 0    UNABLE TO FIND Place 2 drops into both ears daily as needed      meloxicam (MOBIC) 7.5 MG tablet Take 1 tablet by mouth daily 90 tablet 3    montelukast (SINGULAIR) 10 MG tablet TAKE 1 TABLET NIGHTLY 90 tablet 3    vitamin B-12 (CYANOCOBALAMIN) 500 MCG tablet Take 500 mcg by mouth daily Indications: 2500mg a day      Niacin (VITAMIN B-3 PO) Take by mouth      Magnesium 500 MG CAPS Take by mouth      vitamin E 400 UNIT capsule Take 400 Units by mouth daily      Cholecalciferol (VITAMIN D3) 11729 UNITS CAPS Take 10,000 Units by mouth daily.  therapeutic multivitamin-minerals (THERAGRAN-M) tablet Take 1 tablet by mouth daily. No current facility-administered medications for this visit. Family History   Problem Relation Age of Onset    Dementia Mother     Diabetes Father         Review of Systems -   Review of Systems   Constitutional: Negative for activity change, appetite change, chills and fever. HENT: Negative for congestion and postnasal drip. Eyes: Negative. Respiratory: Negative for cough, chest tightness, shortness of breath, wheezing and stridor. Cardiovascular: Negative for chest pain and leg swelling. Gastrointestinal: Negative for diarrhea and nausea. Endocrine: Negative. Genitourinary: Negative. Musculoskeletal: Negative. Negative for arthralgias and back pain. Skin: Negative. Allergic/Immunologic: Negative. Neurological: Negative. Negative for dizziness and light-headedness. Psychiatric/Behavioral: Negative. All other systems reviewed and are negative. Physical Exam:    BMI:  Body mass index is 41.25 kg/m².     Wt Readings from

## 2018-12-15 DIAGNOSIS — E03.9 HYPOTHYROIDISM, UNSPECIFIED TYPE: ICD-10-CM

## 2018-12-17 RX ORDER — LEVOTHYROXINE SODIUM 0.15 MG/1
TABLET ORAL
Qty: 90 TABLET | Refills: 0 | Status: SHIPPED | OUTPATIENT
Start: 2018-12-17 | End: 2019-05-20 | Stop reason: SDUPTHER

## 2018-12-26 ENCOUNTER — OFFICE VISIT (OUTPATIENT)
Dept: FAMILY MEDICINE CLINIC | Age: 73
End: 2018-12-26
Payer: MEDICARE

## 2018-12-26 VITALS
BODY MASS INDEX: 40.02 KG/M2 | WEIGHT: 264.1 LBS | DIASTOLIC BLOOD PRESSURE: 66 MMHG | SYSTOLIC BLOOD PRESSURE: 114 MMHG | HEART RATE: 88 BPM | TEMPERATURE: 98.3 F | RESPIRATION RATE: 20 BRPM | HEIGHT: 68 IN

## 2018-12-26 DIAGNOSIS — K29.00 ACUTE GASTRITIS, PRESENCE OF BLEEDING UNSPECIFIED, UNSPECIFIED GASTRITIS TYPE: Primary | ICD-10-CM

## 2018-12-26 DIAGNOSIS — R10.31 RLQ ABDOMINAL PAIN: ICD-10-CM

## 2018-12-26 DIAGNOSIS — K59.00 CONSTIPATION, UNSPECIFIED CONSTIPATION TYPE: ICD-10-CM

## 2018-12-26 PROCEDURE — 99213 OFFICE O/P EST LOW 20 MIN: CPT | Performed by: FAMILY MEDICINE

## 2018-12-26 RX ORDER — PANTOPRAZOLE SODIUM 40 MG/1
40 TABLET, DELAYED RELEASE ORAL
Qty: 15 TABLET | Refills: 0 | Status: SHIPPED | OUTPATIENT
Start: 2018-12-26 | End: 2019-05-20 | Stop reason: ALTCHOICE

## 2018-12-26 ASSESSMENT — ENCOUNTER SYMPTOMS
ABDOMINAL PAIN: 1
VOMITING: 0
CONSTIPATION: 1
RESPIRATORY NEGATIVE: 1
DIARRHEA: 0
BLOOD IN STOOL: 0
NAUSEA: 1
BACK PAIN: 0

## 2018-12-26 ASSESSMENT — PATIENT HEALTH QUESTIONNAIRE - PHQ9
1. LITTLE INTEREST OR PLEASURE IN DOING THINGS: 0
SUM OF ALL RESPONSES TO PHQ9 QUESTIONS 1 & 2: 0
2. FEELING DOWN, DEPRESSED OR HOPELESS: 0
SUM OF ALL RESPONSES TO PHQ QUESTIONS 1-9: 0
SUM OF ALL RESPONSES TO PHQ QUESTIONS 1-9: 0

## 2018-12-26 NOTE — PROGRESS NOTES
Take by mouth   Yes Historical Provider, MD   Magnesium 500 MG CAPS Take by mouth   Yes Historical Provider, MD   vitamin E 400 UNIT capsule Take 400 Units by mouth daily   Yes Historical Provider, MD   Cholecalciferol (VITAMIN D3) 09256 UNITS CAPS Take 10,000 Units by mouth daily. Yes Historical Provider, MD   therapeutic multivitamin-minerals (THERAGRAN-M) tablet Take 1 tablet by mouth daily. Yes Historical Provider, MD         Review of Systems   Constitutional: Negative. Negative for appetite change and fever. HENT: Negative. Respiratory: Negative. Cardiovascular: Negative. Gastrointestinal: Positive for abdominal pain, constipation and nausea. Negative for blood in stool, diarrhea and vomiting. Genitourinary: Negative for difficulty urinating, dysuria, frequency, hematuria and vaginal discharge. Musculoskeletal: Negative. Negative for back pain. All other systems reviewed and are negative. Objective:   Physical Exam   Constitutional: She is oriented to person, place, and time. She appears well-developed and well-nourished. HENT:   Head: Normocephalic and atraumatic. Right Ear: Tympanic membrane normal.   Left Ear: Tympanic membrane normal.   Mouth/Throat: Oropharynx is clear and moist and mucous membranes are normal.   Cardiovascular: Normal rate, regular rhythm and normal heart sounds. No murmur heard. Pulmonary/Chest: Effort normal and breath sounds normal.   Abdominal: Soft. Bowel sounds are normal. She exhibits no distension. There is tenderness in the right lower quadrant and epigastric area. There is no rigidity, no rebound, no guarding, no CVA tenderness, no tenderness at McBurney's point and negative Razo's sign. Musculoskeletal: She exhibits no edema. Neurological: She is alert and oriented to person, place, and time. Skin: Skin is warm and dry. Psychiatric: She has a normal mood and affect.  Her behavior is normal.   Nursing note and vitals

## 2019-01-03 ENCOUNTER — TELEPHONE (OUTPATIENT)
Dept: FAMILY MEDICINE CLINIC | Age: 74
End: 2019-01-03

## 2019-05-15 ENCOUNTER — TELEPHONE (OUTPATIENT)
Dept: FAMILY MEDICINE CLINIC | Age: 74
End: 2019-05-15

## 2019-05-15 DIAGNOSIS — E78.5 HYPERLIPIDEMIA WITH TARGET LDL LESS THAN 100: ICD-10-CM

## 2019-05-15 DIAGNOSIS — K21.9 GASTROESOPHAGEAL REFLUX DISEASE, ESOPHAGITIS PRESENCE NOT SPECIFIED: ICD-10-CM

## 2019-05-15 DIAGNOSIS — R73.01 IFG (IMPAIRED FASTING GLUCOSE): ICD-10-CM

## 2019-05-15 DIAGNOSIS — E03.9 HYPOTHYROIDISM, UNSPECIFIED TYPE: Primary | ICD-10-CM

## 2019-05-15 NOTE — TELEPHONE ENCOUNTER
Patient is asking for lab order to go get her labs before her appt on 5/20/19. She will go to [facility] to get the labs done.

## 2019-05-16 ENCOUNTER — HOSPITAL ENCOUNTER (OUTPATIENT)
Age: 74
Discharge: HOME | End: 2019-05-16
Payer: MEDICARE

## 2019-05-16 DIAGNOSIS — K21.9 GASTROESOPHAGEAL REFLUX DISEASE, ESOPHAGITIS PRESENCE NOT SPECIFIED: ICD-10-CM

## 2019-05-16 DIAGNOSIS — E78.5 HYPERLIPIDEMIA WITH TARGET LDL LESS THAN 100: ICD-10-CM

## 2019-05-16 DIAGNOSIS — E03.9 HYPOTHYROIDISM, UNSPECIFIED TYPE: ICD-10-CM

## 2019-05-16 DIAGNOSIS — R73.01 IFG (IMPAIRED FASTING GLUCOSE): ICD-10-CM

## 2019-05-16 LAB
ALBUMIN SERPL-MCNC: 3.7 G/DL (ref 3.5–5.1)
ALP BLD-CCNC: 56 U/L (ref 38–126)
ALT SERPL-CCNC: 10 U/L (ref 11–66)
ANION GAP SERPL CALCULATED.3IONS-SCNC: 13 MEQ/L (ref 8–16)
AST SERPL-CCNC: 17 U/L (ref 5–40)
AVERAGE GLUCOSE: 99 MG/DL (ref 70–126)
BASOPHILS # BLD: 1 %
BASOPHILS ABSOLUTE: 0.1 THOU/MM3 (ref 0–0.1)
BILIRUB SERPL-MCNC: 0.4 MG/DL (ref 0.3–1.2)
BUN BLDV-MCNC: 13 MG/DL (ref 7–22)
CALCIUM SERPL-MCNC: 9.5 MG/DL (ref 8.5–10.5)
CHLORIDE BLD-SCNC: 101 MEQ/L (ref 98–111)
CHOLESTEROL, TOTAL: 184 MG/DL (ref 100–199)
CO2: 27 MEQ/L (ref 23–33)
CREAT SERPL-MCNC: 0.6 MG/DL (ref 0.4–1.2)
EOSINOPHIL # BLD: 2.7 %
EOSINOPHILS ABSOLUTE: 0.2 THOU/MM3 (ref 0–0.4)
ERYTHROCYTE [DISTWIDTH] IN BLOOD BY AUTOMATED COUNT: 12.9 % (ref 11.5–14.5)
ERYTHROCYTE [DISTWIDTH] IN BLOOD BY AUTOMATED COUNT: 45.6 FL (ref 35–45)
GFR SERPL CREATININE-BSD FRML MDRD: > 90 ML/MIN/1.73M2
GLUCOSE BLD-MCNC: 104 MG/DL (ref 70–108)
HBA1C MFR BLD: 5.3 % (ref 4.4–6.4)
HCT VFR BLD CALC: 44.8 % (ref 37–47)
HDLC SERPL-MCNC: 55 MG/DL
HEMOGLOBIN: 14.5 GM/DL (ref 12–16)
IMMATURE GRANS (ABS): 0.02 THOU/MM3 (ref 0–0.07)
IMMATURE GRANULOCYTES: 0.3 %
LDL CHOLESTEROL CALCULATED: 107 MG/DL
LYMPHOCYTES # BLD: 26.1 %
LYMPHOCYTES ABSOLUTE: 1.9 THOU/MM3 (ref 1–4.8)
MAGNESIUM: 2.2 MG/DL (ref 1.6–2.4)
MCH RBC QN AUTO: 31 PG (ref 26–33)
MCHC RBC AUTO-ENTMCNC: 32.4 GM/DL (ref 32.2–35.5)
MCV RBC AUTO: 95.9 FL (ref 81–99)
MONOCYTES # BLD: 8.9 %
MONOCYTES ABSOLUTE: 0.6 THOU/MM3 (ref 0.4–1.3)
NUCLEATED RED BLOOD CELLS: 0 /100 WBC
PLATELET # BLD: 234 THOU/MM3 (ref 130–400)
PMV BLD AUTO: 10.7 FL (ref 9.4–12.4)
POTASSIUM SERPL-SCNC: 4.3 MEQ/L (ref 3.5–5.2)
RBC # BLD: 4.67 MILL/MM3 (ref 4.2–5.4)
SEG NEUTROPHILS: 61 %
SEGMENTED NEUTROPHILS ABSOLUTE COUNT: 4.3 THOU/MM3 (ref 1.8–7.7)
SODIUM BLD-SCNC: 141 MEQ/L (ref 135–145)
TOTAL PROTEIN: 7.4 G/DL (ref 6.1–8)
TRIGL SERPL-MCNC: 110 MG/DL (ref 0–199)
TSH SERPL DL<=0.05 MIU/L-ACNC: 0.91 UIU/ML (ref 0.4–4.2)
WBC # BLD: 7.1 THOU/MM3 (ref 4.8–10.8)

## 2019-05-16 PROCEDURE — 83735 ASSAY OF MAGNESIUM: CPT

## 2019-05-16 PROCEDURE — 83036 HEMOGLOBIN GLYCOSYLATED A1C: CPT

## 2019-05-16 PROCEDURE — 84443 ASSAY THYROID STIM HORMONE: CPT

## 2019-05-16 PROCEDURE — 85025 COMPLETE CBC W/AUTO DIFF WBC: CPT

## 2019-05-16 PROCEDURE — 80061 LIPID PANEL: CPT

## 2019-05-16 PROCEDURE — 36415 COLL VENOUS BLD VENIPUNCTURE: CPT

## 2019-05-16 PROCEDURE — 80053 COMPREHEN METABOLIC PANEL: CPT

## 2019-05-20 ENCOUNTER — OFFICE VISIT (OUTPATIENT)
Dept: FAMILY MEDICINE CLINIC | Age: 74
End: 2019-05-20
Payer: MEDICARE

## 2019-05-20 VITALS
HEIGHT: 68 IN | BODY MASS INDEX: 39.86 KG/M2 | WEIGHT: 263 LBS | TEMPERATURE: 97.6 F | RESPIRATION RATE: 16 BRPM | DIASTOLIC BLOOD PRESSURE: 68 MMHG | SYSTOLIC BLOOD PRESSURE: 122 MMHG | HEART RATE: 80 BPM

## 2019-05-20 DIAGNOSIS — E03.9 HYPOTHYROIDISM, UNSPECIFIED TYPE: ICD-10-CM

## 2019-05-20 DIAGNOSIS — K21.9 GASTROESOPHAGEAL REFLUX DISEASE, ESOPHAGITIS PRESENCE NOT SPECIFIED: ICD-10-CM

## 2019-05-20 DIAGNOSIS — E66.01 MORBID OBESITY WITH BMI OF 40.0-44.9, ADULT (HCC): ICD-10-CM

## 2019-05-20 DIAGNOSIS — E78.5 HYPERLIPIDEMIA WITH TARGET LDL LESS THAN 100: ICD-10-CM

## 2019-05-20 DIAGNOSIS — R73.01 IFG (IMPAIRED FASTING GLUCOSE): ICD-10-CM

## 2019-05-20 DIAGNOSIS — G47.00 INSOMNIA, UNSPECIFIED TYPE: Primary | ICD-10-CM

## 2019-05-20 DIAGNOSIS — Z91.09 ENVIRONMENTAL ALLERGIES: ICD-10-CM

## 2019-05-20 PROCEDURE — 99214 OFFICE O/P EST MOD 30 MIN: CPT | Performed by: FAMILY MEDICINE

## 2019-05-20 RX ORDER — TRIAMCINOLONE ACETONIDE 55 UG/1
2 SPRAY, METERED NASAL DAILY PRN
COMMUNITY

## 2019-05-20 RX ORDER — FLUOCINOLONE ACETONIDE 0.11 MG/ML
OIL TOPICAL
Qty: 118 ML | Refills: 0 | Status: SHIPPED | OUTPATIENT
Start: 2019-05-20 | End: 2021-05-20

## 2019-05-20 RX ORDER — TEMAZEPAM 15 MG/1
15 CAPSULE ORAL NIGHTLY PRN
Qty: 90 CAPSULE | Refills: 0 | Status: SHIPPED | OUTPATIENT
Start: 2019-05-20 | End: 2019-08-18

## 2019-05-20 RX ORDER — LEVOCETIRIZINE DIHYDROCHLORIDE 5 MG/1
5 TABLET, FILM COATED ORAL NIGHTLY PRN
COMMUNITY
End: 2019-08-14

## 2019-05-20 RX ORDER — TEMAZEPAM 15 MG/1
15 CAPSULE ORAL NIGHTLY PRN
COMMUNITY
End: 2019-05-20 | Stop reason: SDUPTHER

## 2019-05-20 RX ORDER — LEVOTHYROXINE SODIUM 0.15 MG/1
TABLET ORAL
Qty: 90 TABLET | Refills: 3 | Status: SHIPPED | OUTPATIENT
Start: 2019-05-20 | End: 2020-05-27

## 2019-05-20 ASSESSMENT — PATIENT HEALTH QUESTIONNAIRE - PHQ9
1. LITTLE INTEREST OR PLEASURE IN DOING THINGS: 0
SUM OF ALL RESPONSES TO PHQ QUESTIONS 1-9: 0
2. FEELING DOWN, DEPRESSED OR HOPELESS: 0
SUM OF ALL RESPONSES TO PHQ QUESTIONS 1-9: 0
SUM OF ALL RESPONSES TO PHQ9 QUESTIONS 1 & 2: 0

## 2019-05-20 ASSESSMENT — ENCOUNTER SYMPTOMS
RESPIRATORY NEGATIVE: 1
GASTROINTESTINAL NEGATIVE: 1

## 2019-05-20 NOTE — PROGRESS NOTES
Subjective:      Patient ID: Scott Massey is a 68 y.o. female. HPI:    Chief Complaint   Patient presents with    Annual Exam     Annual Physical and review labs completd 05/16/2019     Annual eval.  Doing well overall. BP well controlled. BP Readings from Last 3 Encounters:   05/20/19 122/68   12/26/18 114/66   12/03/18 128/78     Weight stable. Wt Readings from Last 3 Encounters:   05/20/19 263 lb (119.3 kg)   12/26/18 264 lb 1.6 oz (119.8 kg)   12/03/18 267 lb 12.8 oz (121.5 kg)     Sleeping well on the Restoril. She is getting up frequently at night to go to the bathroom. Hx of ARTIS, compliant with CPAP and feels that her ARTIS is controlled. Allergies controlled on the Xyzal.      Patient Active Problem List   Diagnosis    Hypothyroid    Environmental allergies    Hyperlipidemia with target LDL less than 100    Post menopausal problems    Claustrophobia    Difficulty waking    Morbid obesity with BMI of 40.0-44.9, adult ([address]    ARTIS on CPAP    Insomnia     Past Surgical History:   Procedure Laterality Date    COLONOSCOPY  2007    Wisser    EYE SURGERY Right 02/28/2019    Cataract Surgery with Dr. Pavel Mehta Left 03/13/2019    Cataract Surgery with Dr. Gulshan Stewart Left 2016    Dr Carson Velásquez Left 1/8/2018    COLONOSCOPY SCREENING performed by Pavithra Ochoa DO at [facility]     Prior to Admission medications    Medication Sig Start Date End Date Taking? Authorizing Provider   temazepam (RESTORIL) 15 MG capsule Take 15 mg by mouth nightly as needed for Sleep.    Yes Historical Provider, MD   OLIVE LEAF EXTRACT PO Take 2 capsules by mouth daily   Yes Historical Provider, MD   triamcinolone (NASACORT ALLERGY 24HR) 55 MCG/ACT nasal inhaler 2 sprays by Nasal route daily as needed   Yes Historical Provider, MD   levocetirizine (XYZAL) 5 MG tablet Take 5 mg by mouth nightly as needed   Yes Historical Provider, MD   levothyroxine (SYNTHROID) 150 MCG tablet TAKE 1 TABLET DAILY 12/17/18  Yes Tobiaskrystadanisha Charter, DO   UNABLE TO FIND Place 2 drops into both ears daily as needed   Yes Historical Provider, MD   montelukast (SINGULAIR) 10 MG tablet TAKE 1 TABLET NIGHTLY  Patient taking differently: TAKE 1 TABLET NIGHTLY PRN 3/19/18  Yes Dora Grandchild, APRN - CNP   vitamin B-12 (CYANOCOBALAMIN) 500 MCG tablet Take 500 mcg by mouth daily Indications: 2500mg a day   Yes Historical Provider, MD   Niacin (VITAMIN B-3 PO) Take by mouth daily    Yes Historical Provider, MD   Magnesium 500 MG CAPS Take by mouth daily    Yes Historical Provider, MD   vitamin E 400 UNIT capsule Take 400 Units by mouth daily   Yes Historical Provider, MD   Cholecalciferol (VITAMIN D3) 49405 UNITS CAPS Take 10,000 Units by mouth daily. Yes Historical Provider, MD   therapeutic multivitamin-minerals (THERAGRAN-M) tablet Take 1 tablet by mouth daily.    Yes Historical Provider, MD       Lab Results   Component Value Date    LABA1C 5.3 05/16/2019     No results found for: EAG    No components found for: CHLPL  Lab Results   Component Value Date    TRIG 110 05/16/2019    TRIG 116 05/15/2018    TRIG 153 07/07/2015     Lab Results   Component Value Date    HDL 55 05/16/2019    HDL 49 05/15/2018    HDL 53 07/07/2015     Lab Results   Component Value Date    LDLCALC 107 05/16/2019    LDLCALC 105 05/15/2018    LDLCALC 124 07/07/2015     No results found for: LABVLDL      Chemistry        Component Value Date/Time     05/16/2019 0917    K 4.3 05/16/2019 0917     05/16/2019 0917    CO2 27 05/16/2019 0917    BUN 13 05/16/2019 0917    CREATININE 0.6 05/16/2019 0917        Component Value Date/Time    CALCIUM 9.5 05/16/2019 0917    ALKPHOS 56 05/16/2019 0917    AST 17 05/16/2019 0917    ALT 10 (L) 05/16/2019 0917    BILITOT 0.4 05/16/2019 0917            Lab Results   Component Value Date    TSH 0.906 05/16/2019       Lab Results   Component Value Date    WBC 7.1 05/16/2019 HGB 14.5 05/16/2019    HCT 44.8 05/16/2019    MCV 95.9 05/16/2019     05/16/2019         Health Maintenance   Topic Date Due    Hepatitis C screen  1945    DTaP/Tdap/Td vaccine (1 - Tdap) 07/06/1965    Shingles Vaccine (1 of 2) 07/06/1996    Flu vaccine (Season Ended) 09/01/2019    TSH testing  05/16/2020    Breast cancer screen  11/13/2020    Lipid screen  05/16/2024    Colon cancer screen colonoscopy  01/08/2028    Pneumococcal 65+ years Vaccine  Completed    DEXA (modify frequency per FRAX score)  Addressed       Immunization History   Administered Date(s) Administered    Influenza Virus Vaccine 12/03/2014    Pneumococcal Polysaccharide ([id number]) 12/03/2014         Review of Systems   Constitutional: Negative. HENT: Negative. Respiratory: Negative. Cardiovascular: Negative. Gastrointestinal: Negative. Musculoskeletal: Negative. All other systems reviewed and are negative. Objective:   Physical Exam   Constitutional: She is oriented to person, place, and time. She appears well-developed and well-nourished. HENT:   Head: Normocephalic and atraumatic. Right Ear: Tympanic membrane normal.   Left Ear: Tympanic membrane normal.   Mouth/Throat: Oropharynx is clear and moist and mucous membranes are normal.   Neck: Carotid bruit is not present. Cardiovascular: Normal rate, regular rhythm and normal heart sounds. No murmur heard. Pulmonary/Chest: Effort normal and breath sounds normal.   Abdominal: Soft. Bowel sounds are normal.   Musculoskeletal: She exhibits no edema. Neurological: She is alert and oriented to person, place, and time. Skin: Skin is warm and dry. Psychiatric: She has a normal mood and affect. Her behavior is normal.   Nursing note and vitals reviewed. Assessment:       Diagnosis Orders   1. Insomnia, unspecified type  temazepam (RESTORIL) 15 MG capsule   2.  Hypothyroidism, unspecified type  levothyroxine (SYNTHROID) 150 MCG tablet 3. Morbid obesity with BMI of 40.0-44.9, adult ([facility] [address]     4. Hyperlipidemia with target LDL less than 100     5. IFG (impaired fasting glucose)     6. Gastroesophageal reflux disease, esophagitis presence not specified     7. Environmental allergies             Plan:      -  Chronic medical problems stable  -  Labs reviewed, look great  -  Continue current medications  -  Follow up with specialists as scheduled  -  Pt to look into Shingrix  -  RTO annually    [facility] received counseling on the following healthy behaviors: nutrition and exercise    Patient given educational materials on Nutrition and Exercise    I have instructed [facility] to complete a self tracking handout on Weights and instructed them to bring it with them to her next appointment. Discussed use, benefit, and side effects of prescribed medications. Barriers to medication compliance addressed. All patient questions answered. Pt voiced understanding. Quality & Risk Score Accuracy    Visit Dx:  E66.01, Z68.41 - Morbid obesity with BMI of 40.0-44.9, adult ([facility] [address]  The current BMI is 40.58 kg/m2 as of 05/20/19 14:04 EDT  Assessment and plan:  Stable based upon symptoms and exam. Continue current treatment plan and follow up at least yearly.   Last edited 05/20/19 14:04 EDT by DO Khadijah Craig DO

## 2019-05-20 NOTE — PROGRESS NOTES
Have you seen any other physician or provider since your last visit yes - Dr. Ramón Otero    Have you had any other diagnostic tests since your last visit? Yes labs    Have you changed or stopped any medications since your last visit including any over-the-counter medicines, vitamins, or herbal medicines? no     Are you taking all your prescribed medications? Yes    If NO, why?             BP Readings from Last 2 Encounters:   05/20/19 122/68   12/26/18 114/66     Hemoglobin A1C (%)   Date Value   05/16/2019 5.3     LDL Calculated (mg/dL)   Date Value   05/16/2019 107              Tobacco use:  Patient  reports that she quit smoking about 23 years ago. Her smoking use included cigarettes. She has a 3.75 pack-year smoking history. She has never used smokeless tobacco.    If Smoker - Cessation materials given? NA    Is Daily aspirin on medication list?:  No      Patient Self-Management Goal for Chronic Condition  Goal: I will wear a pedometer and get at least 10,000 steps/day.   Barriers to success: none  Plan for overcoming my barriers: N/A     Confidence: 10/10  Date goal set: 5/20/19  Date goal attained: \

## 2019-06-20 ENCOUNTER — OFFICE VISIT (OUTPATIENT)
Dept: CARDIOLOGY CLINIC | Age: 74
End: 2019-06-20
Payer: MEDICARE

## 2019-06-20 VITALS
BODY MASS INDEX: 40.62 KG/M2 | DIASTOLIC BLOOD PRESSURE: 74 MMHG | HEIGHT: 68 IN | HEART RATE: 74 BPM | SYSTOLIC BLOOD PRESSURE: 120 MMHG | WEIGHT: 268 LBS

## 2019-06-20 DIAGNOSIS — R06.02 SOB (SHORTNESS OF BREATH): Primary | ICD-10-CM

## 2019-06-20 DIAGNOSIS — I10 ESSENTIAL HYPERTENSION: ICD-10-CM

## 2019-06-20 PROCEDURE — 93000 ELECTROCARDIOGRAM COMPLETE: CPT | Performed by: NUCLEAR MEDICINE

## 2019-06-20 PROCEDURE — 99213 OFFICE O/P EST LOW 20 MIN: CPT | Performed by: NUCLEAR MEDICINE

## 2019-06-20 NOTE — PROGRESS NOTES
Pt here for a 1 yr f/u    EKG done today    Pt denies cp, sob, dizziness, SOLE and palpitations
MCG tablet TAKE 1 TABLET DAILY 90 tablet 3    fluocinolone (DERMA-SMOOTHE/FS BODY) 0.01 % OIL external oil Apply 5 gtts BID both ears 118 mL 0    UNABLE TO FIND Place 2 drops into both ears daily as needed      montelukast (SINGULAIR) 10 MG tablet TAKE 1 TABLET NIGHTLY (Patient taking differently: TAKE 1 TABLET NIGHTLY PRN) 90 tablet 3    vitamin B-12 (CYANOCOBALAMIN) 500 MCG tablet Take 500 mcg by mouth daily Indications: 2500mg a day      Niacin (VITAMIN B-3 PO) Take by mouth daily       Magnesium 500 MG CAPS Take by mouth daily       vitamin E 400 UNIT capsule Take 400 Units by mouth daily      Cholecalciferol (VITAMIN D3) 61944 UNITS CAPS Take 10,000 Units by mouth daily.  therapeutic multivitamin-minerals (THERAGRAN-M) tablet Take 1 tablet by mouth daily. No current facility-administered medications for this visit.       Allergies   Allergen Reactions    Naproxen Swelling     Health Maintenance   Topic Date Due    Hepatitis C screen  1945    DTaP/Tdap/Td vaccine (1 - Tdap) 07/06/1965    Shingles Vaccine (1 of 2) 07/06/1996    Annual Wellness Visit (AWV)  02/18/2016    Flu vaccine (Season Ended) 09/01/2019    TSH testing  05/16/2020    Breast cancer screen  11/13/2020    Lipid screen  05/16/2024    Colon cancer screen colonoscopy  01/08/2028    Pneumococcal 65+ years Vaccine  Completed    DEXA (modify frequency per FRAX score)  Addressed       Subjective:  Review of Systems  General:   No fever, no chills, No fatigue or weight loss  Pulmonary:    No dyspnea, no wheezing  Cardiac:    Denies recent chest pain,   GI:     No nausea or vomiting, no abdominal pain  Neuro:    No dizziness or light headedness,   Musculoskeletal:  No recent active issues  Extremities:   No edema, good peripheral pulses      Objective:  Physical Exam  /74   Pulse 74   Ht 5' 7.5\" (1.715 m)   Wt 268 lb (121.6 kg)   LMP  (Exact Date)   BMI 41.36 kg/m²   General:   Well developed, well

## 2019-08-13 RX ORDER — CELECOXIB 200 MG/1
CAPSULE ORAL
Refills: 0 | COMMUNITY
Start: 2019-05-27 | End: 2019-08-14

## 2019-08-14 ENCOUNTER — OFFICE VISIT (OUTPATIENT)
Dept: FAMILY MEDICINE CLINIC | Age: 74
End: 2019-08-14
Payer: MEDICARE

## 2019-08-14 VITALS
WEIGHT: 265.8 LBS | RESPIRATION RATE: 16 BRPM | OXYGEN SATURATION: 96 % | HEART RATE: 74 BPM | DIASTOLIC BLOOD PRESSURE: 74 MMHG | SYSTOLIC BLOOD PRESSURE: 126 MMHG | BODY MASS INDEX: 41.02 KG/M2

## 2019-08-14 DIAGNOSIS — J30.89 ENVIRONMENTAL AND SEASONAL ALLERGIES: Primary | ICD-10-CM

## 2019-08-14 PROCEDURE — 96372 THER/PROPH/DIAG INJ SC/IM: CPT | Performed by: FAMILY MEDICINE

## 2019-08-14 PROCEDURE — 99213 OFFICE O/P EST LOW 20 MIN: CPT | Performed by: FAMILY MEDICINE

## 2019-08-14 RX ORDER — METHYLPREDNISOLONE ACETATE 40 MG/ML
40 INJECTION, SUSPENSION INTRA-ARTICULAR; INTRALESIONAL; INTRAMUSCULAR; SOFT TISSUE ONCE
Status: COMPLETED | OUTPATIENT
Start: 2019-08-14 | End: 2019-08-14

## 2019-08-14 RX ORDER — METHYLPREDNISOLONE ACETATE 80 MG/ML
80 INJECTION, SUSPENSION INTRA-ARTICULAR; INTRALESIONAL; INTRAMUSCULAR; SOFT TISSUE ONCE
Status: COMPLETED | OUTPATIENT
Start: 2019-08-14 | End: 2019-08-14

## 2019-08-14 RX ORDER — CELECOXIB 100 MG/1
100 CAPSULE ORAL 2 TIMES DAILY
Qty: 180 CAPSULE | Refills: 3 | Status: SHIPPED | OUTPATIENT
Start: 2019-08-14 | End: 2021-02-15 | Stop reason: SDUPTHER

## 2019-08-14 RX ADMIN — METHYLPREDNISOLONE ACETATE 40 MG: 40 INJECTION, SUSPENSION INTRA-ARTICULAR; INTRALESIONAL; INTRAMUSCULAR; SOFT TISSUE at 13:58

## 2019-08-14 RX ADMIN — METHYLPREDNISOLONE ACETATE 80 MG: 80 INJECTION, SUSPENSION INTRA-ARTICULAR; INTRALESIONAL; INTRAMUSCULAR; SOFT TISSUE at 13:58

## 2019-08-14 ASSESSMENT — ENCOUNTER SYMPTOMS
SINUS PRESSURE: 1
RESPIRATORY NEGATIVE: 1
RHINORRHEA: 1
GASTROINTESTINAL NEGATIVE: 1

## 2019-08-14 NOTE — PROGRESS NOTES
APRN - CNP   vitamin B-12 (CYANOCOBALAMIN) 500 MCG tablet Take 500 mcg by mouth daily Indications: 2500mg a day   Yes Historical Provider, MD   Niacin (VITAMIN B-3 PO) Take by mouth daily    Yes Historical Provider, MD   Magnesium 500 MG CAPS Take by mouth daily    Yes Historical Provider, MD   vitamin E 400 UNIT capsule Take 400 Units by mouth daily   Yes Historical Provider, MD   Cholecalciferol (VITAMIN D3) 14855 UNITS CAPS Take 10,000 Units by mouth daily. Yes Historical Provider, MD   therapeutic multivitamin-minerals (THERAGRAN-M) tablet Take 1 tablet by mouth daily. Yes Historical Provider, MD         Review of Systems   Constitutional: Negative. HENT: Positive for congestion, postnasal drip, rhinorrhea, sinus pressure and sneezing. Respiratory: Negative. Cardiovascular: Negative. Gastrointestinal: Negative. Musculoskeletal: Negative. All other systems reviewed and are negative. Objective:   Physical Exam   Constitutional: She is oriented to person, place, and time. She appears well-developed and well-nourished. HENT:   Head: Normocephalic and atraumatic. Right Ear: A middle ear effusion is present. Left Ear: A middle ear effusion is present. Nose: Mucosal edema and rhinorrhea present. Mouth/Throat: Oropharynx is clear and moist and mucous membranes are normal.   Cardiovascular: Normal rate, regular rhythm and normal heart sounds. No murmur heard. Pulmonary/Chest: Effort normal and breath sounds normal.   Abdominal: Soft. Bowel sounds are normal.   Musculoskeletal: She exhibits no edema. Neurological: She is alert and oriented to person, place, and time. Skin: Skin is warm and dry. Psychiatric: She has a normal mood and affect. Her behavior is normal.   Nursing note and vitals reviewed. Assessment:       Diagnosis Orders   1.  Environmental and seasonal allergies  methylPREDNISolone acetate (DEPO-MEDROL) injection 80 mg    methylPREDNISolone acetate

## 2019-09-04 ENCOUNTER — HOSPITAL ENCOUNTER (OUTPATIENT)
Dept: GENERAL RADIOLOGY | Age: 74
Discharge: HOME | End: 2019-09-04
Payer: MEDICARE

## 2019-09-04 ENCOUNTER — OFFICE VISIT (OUTPATIENT)
Dept: FAMILY MEDICINE CLINIC | Age: 74
End: 2019-09-04
Payer: MEDICARE

## 2019-09-04 ENCOUNTER — HOSPITAL ENCOUNTER (OUTPATIENT)
Age: 74
Discharge: HOME | End: 2019-09-04
Payer: MEDICARE

## 2019-09-04 VITALS
BODY MASS INDEX: 40.22 KG/M2 | WEIGHT: 265.4 LBS | HEIGHT: 68 IN | HEART RATE: 80 BPM | SYSTOLIC BLOOD PRESSURE: 124 MMHG | DIASTOLIC BLOOD PRESSURE: 60 MMHG | RESPIRATION RATE: 20 BRPM

## 2019-09-04 DIAGNOSIS — M16.12 PRIMARY OSTEOARTHRITIS OF LEFT HIP: ICD-10-CM

## 2019-09-04 DIAGNOSIS — G57.02 PIRIFORMIS SYNDROME OF LEFT SIDE: ICD-10-CM

## 2019-09-04 DIAGNOSIS — M53.3 SACROILIAC PAIN: ICD-10-CM

## 2019-09-04 DIAGNOSIS — M53.3 SACROILIAC PAIN: Primary | ICD-10-CM

## 2019-09-04 PROCEDURE — 99213 OFFICE O/P EST LOW 20 MIN: CPT | Performed by: FAMILY MEDICINE

## 2019-09-04 PROCEDURE — 73502 X-RAY EXAM HIP UNI 2-3 VIEWS: CPT

## 2019-09-04 PROCEDURE — 72100 X-RAY EXAM L-S SPINE 2/3 VWS: CPT

## 2019-09-04 ASSESSMENT — ENCOUNTER SYMPTOMS
RESPIRATORY NEGATIVE: 1
GASTROINTESTINAL NEGATIVE: 1

## 2019-09-04 NOTE — PATIENT INSTRUCTIONS
You may receive a survey regarding the care you received during your visit. Your input is valuable to us. We encourage you to complete and return your survey. We hope you will choose us in the future for your healthcare needs. Patient Education        Piriformis Syndrome: Exercises  Introduction  Here are some examples of exercises for you to try. The exercises may be suggested for a condition or for rehabilitation. Start each exercise slowly. Ease off the exercises if you start to have pain. You will be told when to start these exercises and which ones will work best for you. How to do the exercises  Hip rotator stretch    1. Lie on your back with both knees bent and your feet flat on the floor. 2. Put the ankle of your affected leg on your opposite thigh near your knee. 3. Use your hand to gently push your knee (on your affected leg) away from your body until you feel a gentle stretch around your hip. 4. Hold the stretch for 15 to 30 seconds. 5. Repeat 2 to 4 times. 6. Switch legs and repeat steps 1 through 5. Piriformis stretch    1. Lie on your back with your legs straight. 2. Lift your affected leg and bend your knee. With your opposite hand, reach across your body, and then gently pull your knee toward your opposite shoulder. 3. Hold the stretch for 15 to 30 seconds. 4. Repeat with your other leg. 5. Repeat 2 to 4 times on each side. Lower abdominal strengthening    1. Lie on your back with your knees bent and your feet flat on the floor. 2. Tighten your belly muscles by pulling your belly button in toward your spine. 3. Lift one foot off the floor and bring your knee toward your chest, so that your knee is straight above your hip and your leg is bent like the letter \"L. \"  4. Lift the other knee up to the same position. 5. Lower one leg at a time to the starting position. 6. Keep alternating legs until you have lifted each leg 8 to 12 times.   7. Be sure to keep your belly muscles

## 2019-11-08 ENCOUNTER — OFFICE VISIT (OUTPATIENT)
Dept: FAMILY MEDICINE CLINIC | Age: 74
End: 2019-11-08
Payer: MEDICARE

## 2019-11-08 VITALS
DIASTOLIC BLOOD PRESSURE: 72 MMHG | SYSTOLIC BLOOD PRESSURE: 116 MMHG | HEART RATE: 72 BPM | OXYGEN SATURATION: 98 % | TEMPERATURE: 97.9 F | BODY MASS INDEX: 41.34 KG/M2 | WEIGHT: 267.9 LBS | RESPIRATION RATE: 16 BRPM

## 2019-11-08 DIAGNOSIS — J01.40 ACUTE NON-RECURRENT PANSINUSITIS: Primary | ICD-10-CM

## 2019-11-08 PROCEDURE — 99213 OFFICE O/P EST LOW 20 MIN: CPT | Performed by: NURSE PRACTITIONER

## 2019-11-08 RX ORDER — LORATADINE 10 MG/1
10 TABLET ORAL DAILY
COMMUNITY
End: 2019-12-03

## 2019-11-08 RX ORDER — AMOXICILLIN AND CLAVULANATE POTASSIUM 875; 125 MG/1; MG/1
1 TABLET, FILM COATED ORAL 2 TIMES DAILY WITH MEALS
Qty: 20 TABLET | Refills: 0 | Status: SHIPPED | OUTPATIENT
Start: 2019-11-08 | End: 2019-11-18

## 2019-11-08 RX ORDER — BENZONATATE 100 MG/1
200 CAPSULE ORAL 3 TIMES DAILY PRN
Qty: 60 CAPSULE | Refills: 0 | Status: SHIPPED | OUTPATIENT
Start: 2019-11-08 | End: 2019-11-15

## 2019-11-08 ASSESSMENT — ENCOUNTER SYMPTOMS
RHINORRHEA: 1
SHORTNESS OF BREATH: 0
NAUSEA: 0
COUGH: 1
ABDOMINAL PAIN: 0
SINUS PAIN: 1
SINUS PRESSURE: 1

## 2019-11-11 ENCOUNTER — PATIENT MESSAGE (OUTPATIENT)
Dept: FAMILY MEDICINE CLINIC | Age: 74
End: 2019-11-11

## 2019-11-11 DIAGNOSIS — J01.40 ACUTE NON-RECURRENT PANSINUSITIS: Primary | ICD-10-CM

## 2019-11-11 RX ORDER — PREDNISONE 50 MG/1
50 TABLET ORAL DAILY
Qty: 4 TABLET | Refills: 0 | Status: SHIPPED | OUTPATIENT
Start: 2019-11-11 | End: 2019-11-15

## 2019-11-18 ENCOUNTER — PATIENT MESSAGE (OUTPATIENT)
Dept: FAMILY MEDICINE CLINIC | Age: 74
End: 2019-11-18

## 2019-11-18 DIAGNOSIS — J01.40 ACUTE NON-RECURRENT PANSINUSITIS: Primary | ICD-10-CM

## 2019-11-19 RX ORDER — LEVOFLOXACIN 500 MG/1
500 TABLET, FILM COATED ORAL DAILY
Qty: 10 TABLET | Refills: 0 | Status: SHIPPED | OUTPATIENT
Start: 2019-11-19 | End: 2019-11-29

## 2019-12-03 ENCOUNTER — OFFICE VISIT (OUTPATIENT)
Dept: PULMONOLOGY | Age: 74
End: 2019-12-03
Payer: MEDICARE

## 2019-12-03 VITALS
OXYGEN SATURATION: 94 % | BODY MASS INDEX: 40.92 KG/M2 | WEIGHT: 270 LBS | HEART RATE: 74 BPM | DIASTOLIC BLOOD PRESSURE: 86 MMHG | RESPIRATION RATE: 18 BRPM | HEIGHT: 68 IN | SYSTOLIC BLOOD PRESSURE: 130 MMHG

## 2019-12-03 DIAGNOSIS — Z99.89 OSA ON CPAP: Primary | ICD-10-CM

## 2019-12-03 DIAGNOSIS — G47.33 OSA ON CPAP: Primary | ICD-10-CM

## 2019-12-03 DIAGNOSIS — E66.01 MORBID OBESITY WITH BMI OF 40.0-44.9, ADULT (HCC): ICD-10-CM

## 2019-12-03 PROCEDURE — 99213 OFFICE O/P EST LOW 20 MIN: CPT | Performed by: PHYSICIAN ASSISTANT

## 2019-12-03 RX ORDER — LEVOCETIRIZINE DIHYDROCHLORIDE 5 MG/1
5 TABLET, FILM COATED ORAL NIGHTLY
COMMUNITY
End: 2021-05-20

## 2019-12-03 RX ORDER — TEMAZEPAM 15 MG/1
15 CAPSULE ORAL NIGHTLY PRN
COMMUNITY
End: 2021-05-20

## 2019-12-03 RX ORDER — YOHIMBE BARK 500 MG
CAPSULE ORAL
COMMUNITY

## 2019-12-03 ASSESSMENT — ENCOUNTER SYMPTOMS
STRIDOR: 0
BACK PAIN: 0
DIARRHEA: 0
ALLERGIC/IMMUNOLOGIC NEGATIVE: 1
SHORTNESS OF BREATH: 0
COUGH: 0
EYES NEGATIVE: 1
WHEEZING: 0
NAUSEA: 0
CHEST TIGHTNESS: 0

## 2020-01-25 ENCOUNTER — HOSPITAL ENCOUNTER (EMERGENCY)
Age: 75
Discharge: HOME | End: 2020-01-25
Payer: MEDICARE

## 2020-01-25 VITALS
HEART RATE: 84 BPM | WEIGHT: 260 LBS | DIASTOLIC BLOOD PRESSURE: 67 MMHG | RESPIRATION RATE: 12 BRPM | OXYGEN SATURATION: 98 % | BODY MASS INDEX: 40.12 KG/M2 | TEMPERATURE: 97.8 F | SYSTOLIC BLOOD PRESSURE: 134 MMHG

## 2020-01-25 PROCEDURE — 99213 OFFICE O/P EST LOW 20 MIN: CPT | Performed by: NURSE PRACTITIONER

## 2020-01-25 PROCEDURE — 99212 OFFICE O/P EST SF 10 MIN: CPT

## 2020-01-25 RX ORDER — PREDNISONE 20 MG/1
20 TABLET ORAL 2 TIMES DAILY
Qty: 10 TABLET | Refills: 0 | Status: SHIPPED | OUTPATIENT
Start: 2020-01-25 | End: 2020-01-30

## 2020-01-25 ASSESSMENT — ENCOUNTER SYMPTOMS
VOMITING: 0
SHORTNESS OF BREATH: 0
WHEEZING: 0
TROUBLE SWALLOWING: 0
EYE REDNESS: 0
SORE THROAT: 0
DIARRHEA: 0
EYE DISCHARGE: 0
RHINORRHEA: 0
ALLERGIC/IMMUNOLOGIC NEGATIVE: 1
NAUSEA: 0
BACK PAIN: 0
CONSTIPATION: 0
EYE PAIN: 0
COUGH: 0
ABDOMINAL PAIN: 0

## 2020-01-25 NOTE — ED PROVIDER NOTES
is well-developed. She is not diaphoretic. HENT:      Right Ear: Hearing, tympanic membrane, ear canal and external ear normal.      Left Ear: Hearing, tympanic membrane, ear canal and external ear normal.      Nose: Nose normal. No mucosal edema or rhinorrhea. Mouth/Throat:      Dentition: Normal dentition. Pharynx: Uvula midline. No posterior oropharyngeal erythema. Tonsils: No tonsillar exudate. Swellin on the right. 0 on the left. Eyes:      General: Lids are normal.         Right eye: No discharge. Left eye: No discharge. Conjunctiva/sclera: Conjunctivae normal.      Right eye: Right conjunctiva is not injected. No chemosis. Left eye: No chemosis. Pupils: Pupils are equal, round, and reactive to light. Neck:      Musculoskeletal: Full passive range of motion without pain. Vascular: No JVD. Trachea: Trachea normal.   Cardiovascular:      Rate and Rhythm: Regular rhythm. Heart sounds: Normal heart sounds. No murmur. Pulmonary:      Effort: Pulmonary effort is normal. No respiratory distress. Breath sounds: Normal breath sounds. No stridor. No wheezing. Abdominal:      General: Bowel sounds are normal.      Palpations: Abdomen is soft. Tenderness: There is no abdominal tenderness. Lymphadenopathy:      Cervical: No cervical adenopathy. Skin:     General: Skin is warm. Capillary Refill: Capillary refill takes less than 2 seconds. Findings: Rash present. Neurological:      Mental Status: She is alert and oriented to person, place, and time. GCS: GCS eye subscore is 4. GCS verbal subscore is 5. GCS motor subscore is 6. Cranial Nerves: No cranial nerve deficit. Coordination: Coordination normal.      Gait: Gait normal.   Psychiatric:         Mood and Affect: Mood is not anxious or depressed. Speech: Speech normal.         Behavior: Behavior normal. Behavior is not withdrawn or hyperactive.  Behavior is cooperative. Thought Content: Thought content normal.         Judgment: Judgment normal.         DIAGNOSTIC RESULTS   Labs: No results found for this visit on 01/25/20. IMAGING:    URGENT CARE COURSE:     Vitals:    01/25/20 0811   BP: 134/67   Pulse: 84   Resp: 12   Temp: 97.8 °F (36.6 °C)   SpO2: 98%   Weight: 260 lb (117.9 kg)             Medications - No data to display  PROCEDURES:  None  FINAL IMPRESSION      1. Rash and other nonspecific skin eruption        DISPOSITION/PLAN   DISPOSITION Decision To Discharge 01/25/2020 08:22:53 AM    PATIENT REFERRED TO:  No follow-up provider specified. DISCHARGE MEDICATIONS:  Discharge Medication List as of 1/25/2020  8:24 AM      START taking these medications    Details   predniSONE (DELTASONE) 20 MG tablet Take 1 tablet by mouth 2 times daily for 5 days, Disp-10 tablet, R-0Normal      hydrocortisone 2.5 % cream Apply topically 2 times daily for 7 days Apply topically 2 times daily. , Topical, 2 TIMES DAILY Starting Sat 1/25/2020, Until Sat 2/1/2020, For 7 days, Disp-1 Tube, R-0, Normal           Discharge Medication List as of 1/25/2020  8:24 AM          ADE Arciniega - ADE Rivas CNP  01/25/20 5281

## 2020-01-27 ENCOUNTER — TELEPHONE (OUTPATIENT)
Dept: FAMILY MEDICINE CLINIC | Age: 75
End: 2020-01-27

## 2020-05-27 RX ORDER — LEVOTHYROXINE SODIUM 0.15 MG/1
TABLET ORAL
Qty: 90 TABLET | Refills: 3 | Status: SHIPPED | OUTPATIENT
Start: 2020-05-27 | End: 2021-05-20

## 2020-06-22 ENCOUNTER — OFFICE VISIT (OUTPATIENT)
Dept: CARDIOLOGY CLINIC | Age: 75
End: 2020-06-22
Payer: MEDICARE

## 2020-06-22 VITALS
WEIGHT: 262.2 LBS | HEART RATE: 69 BPM | DIASTOLIC BLOOD PRESSURE: 72 MMHG | SYSTOLIC BLOOD PRESSURE: 132 MMHG | HEIGHT: 68 IN | BODY MASS INDEX: 39.74 KG/M2

## 2020-06-22 PROCEDURE — 99213 OFFICE O/P EST LOW 20 MIN: CPT | Performed by: NUCLEAR MEDICINE

## 2020-06-22 PROCEDURE — 93000 ELECTROCARDIOGRAM COMPLETE: CPT | Performed by: NUCLEAR MEDICINE

## 2020-06-22 NOTE — PROGRESS NOTES
Pt denies CP, SOB, lightheadedness, dizziness, palpitations.
edema, no obvious claudication       Objective:  Physical Exam  /72   Pulse 69   Ht 5' 7.5\" (1.715 m)   Wt 262 lb 3.2 oz (118.9 kg)   BMI 40.46 kg/m²   General:   Well developed, well nourished  Lungs:   Clear to auscultation  Heart:    Normal S1 S2, Slight murmur. no rubs, no gallops  Abdomen:   Soft, non tender, no organomegalies, positive bowel sounds  Extremities:   No edema, no cyanosis, good peripheral pulses  Neurological:   Awake, alert, oriented. No obvious focal deficits  Musculoskelatal:  No obvious deformities    Assessment:      Diagnosis Orders   1. SOB (shortness of breath)  EKG 12 lead   2. Essential hypertension     cardiac fair for now   ECG in office was done today. I reviewed the ECG. No acute findings    Plan:  No follow-ups on file. As above  Continue risk factor modification and medical management  Thank you for allowing me to participate in the care of your patient. Please don't hesitate to contact me regarding any further issues related to the patient care    Orders Placed:  Orders Placed This Encounter   Procedures    EKG 12 lead     Order Specific Question:   Reason for Exam?     Answer: Other       Medications Prescribed:  No orders of the defined types were placed in this encounter. Discussed use, benefit, and side effects of prescribed medications. All patient questions answered. Pt voicedunderstanding. Instructed to continue current medications, diet and exercise. Continue risk factor modification and medical management. Patient agreed with treatment plan. Follow up as directed.     Electronically signedby Dagoberto Jimenez MD on 6/22/2020 at 12:19 PM

## 2020-06-25 ENCOUNTER — TELEPHONE (OUTPATIENT)
Dept: CARDIOLOGY CLINIC | Age: 75
End: 2020-06-25

## 2020-06-25 NOTE — TELEPHONE ENCOUNTER
Pre op Risk Assessment    Procedure knee scope  Physician Dr. Denny Se   Date of surgery/procedure TBA    Last OV 6/22/2020  Last Stress none in chart   Last Echo none in chart   Last Cath none  Last Stent none  Is patient on blood thinners none  Hold Meds/how many days

## 2020-07-15 ENCOUNTER — TELEPHONE (OUTPATIENT)
Dept: FAMILY MEDICINE CLINIC | Age: 75
End: 2020-07-15

## 2020-07-15 ENCOUNTER — TELEMEDICINE (OUTPATIENT)
Dept: FAMILY MEDICINE CLINIC | Age: 75
End: 2020-07-15
Payer: MEDICARE

## 2020-07-15 PROCEDURE — 99442 PR PHYS/QHP TELEPHONE EVALUATION 11-20 MIN: CPT | Performed by: FAMILY MEDICINE

## 2020-07-15 RX ORDER — PREDNISONE 20 MG/1
20 TABLET ORAL 2 TIMES DAILY
Qty: 10 TABLET | Refills: 0 | Status: SHIPPED | OUTPATIENT
Start: 2020-07-15 | End: 2020-07-20

## 2020-07-15 ASSESSMENT — ENCOUNTER SYMPTOMS
SINUS PRESSURE: 1
RESPIRATORY NEGATIVE: 1
RHINORRHEA: 1
COUGH: 0
GASTROINTESTINAL NEGATIVE: 1
SHORTNESS OF BREATH: 0
CHEST TIGHTNESS: 0

## 2020-07-15 NOTE — TELEPHONE ENCOUNTER
Pt scheduled .     Future Appointments   Date Time Provider [facility] Dorene   7/15/2020  3:15 PM Michael Stanley DO [facility] MANISHA [email or web address].[email or web address]   12/8/2020  9:15 AM Ahsok Roger PA-C Pulm Med [address] [facility]   6/23/2021  1:45 PM Nandini Nova MD [address] [facility] NAOMY DYER II.JESSIE

## 2020-07-15 NOTE — PROGRESS NOTES
Subjective:      Patient ID: Amanda Sears is a 77 y.o. female. HPI:    Chief Complaint   Patient presents with   Angelica Oconnell is a 77 y.o. female evaluated via telephone on 7/15/2020. Consent:  She and/or health care decision maker is aware that that she may receive a bill for this telephone service, depending on her insurance coverage, and has provided verbal consent to proceed: Yes      Documentation:  I communicated with the patient and/or health care decision maker about sinus pressure, allergies. Details of this discussion including any medical advice provided: see A/P      I affirm this is a Patient Initiated Episode with a Patient who has not had a related appointment within my department in the past 7 days or scheduled within the next 24 hours. Patient identification was verified at the start of the visit: Yes    Total Time: minutes: 11-20 minutes    Note: not billable if this call serves to triage the patient into an appointment for the relevant concern      Rad Pimentel Pt schedule a visit today for sinus congestion, pain and pressure for the last week. Using Nasacort, Xyzal, and daily rinses. Recently had to stop all of her medication due to having knee surgery last week but now cant' \"get caught back up\". She denies fevers. Denies cough, chest tightness. She does not feel that she has an infection, believes this is all related to her allergies.     Patient Active Problem List   Diagnosis    Hypothyroid    Environmental allergies    Hyperlipidemia with target LDL less than 100    Post menopausal problems    Claustrophobia    Difficulty waking    Morbid obesity with BMI of 40.0-44.9, adult ([facility] [address]    ARTIS on CPAP    Insomnia     Past Surgical History:   Procedure Laterality Date    COLONOSCOPY  2007    Wisser    EYE SURGERY Right 02/28/2019    Cataract Surgery with Dr. Yanci Cazares Left 03/13/2019    Cataract Surgery minutes    Services were provided through a video synchronous discussion virtually to substitute for in-person clinic visit. Patient and provider were located at their individual homes.           Caitlin Sharma DO

## 2020-07-27 RX ORDER — MONTELUKAST SODIUM 10 MG/1
10 TABLET ORAL NIGHTLY
Qty: 90 TABLET | Refills: 3 | Status: SHIPPED | OUTPATIENT
Start: 2020-07-27 | End: 2021-05-20

## 2020-08-05 ENCOUNTER — OFFICE VISIT (OUTPATIENT)
Dept: FAMILY MEDICINE CLINIC | Age: 75
End: 2020-08-05
Payer: MEDICARE

## 2020-08-05 VITALS
TEMPERATURE: 97.6 F | WEIGHT: 261.2 LBS | HEART RATE: 80 BPM | DIASTOLIC BLOOD PRESSURE: 70 MMHG | BODY MASS INDEX: 40.31 KG/M2 | SYSTOLIC BLOOD PRESSURE: 128 MMHG | RESPIRATION RATE: 16 BRPM

## 2020-08-05 PROCEDURE — 3288F FALL RISK ASSESSMENT DOCD: CPT | Performed by: FAMILY MEDICINE

## 2020-08-05 PROCEDURE — G8510 SCR DEP NEG, NO PLAN REQD: HCPCS | Performed by: FAMILY MEDICINE

## 2020-08-05 PROCEDURE — 99214 OFFICE O/P EST MOD 30 MIN: CPT | Performed by: FAMILY MEDICINE

## 2020-08-05 ASSESSMENT — ENCOUNTER SYMPTOMS
RESPIRATORY NEGATIVE: 1
GASTROINTESTINAL NEGATIVE: 1

## 2020-08-05 ASSESSMENT — PATIENT HEALTH QUESTIONNAIRE - PHQ9
SUM OF ALL RESPONSES TO PHQ QUESTIONS 1-9: 0
2. FEELING DOWN, DEPRESSED OR HOPELESS: 0
1. LITTLE INTEREST OR PLEASURE IN DOING THINGS: 0
SUM OF ALL RESPONSES TO PHQ QUESTIONS 1-9: 0
SUM OF ALL RESPONSES TO PHQ9 QUESTIONS 1 & 2: 0

## 2020-08-05 NOTE — PROGRESS NOTES
Subjective:      Patient ID: Viktoriya Mora is a 77 y.o. female. HPI:    Chief Complaint   Patient presents with    Weight Loss     down 10 lbs per patient    Other     cold intolerance    Headache    Fatigue     Pt here with a few concerns today. Pt is concerned about her appetite, \"out of control\". Can eat all the time. Questions if this is related to low BS. BS recently checked and was 94. A1C fine last year, wishes to check again. Lab Results   Component Value Date    LABA1C 5.3 05/16/2019     Weights stable. Wt Readings from Last 3 Encounters:   08/05/20 261 lb 3.2 oz (118.5 kg)   06/22/20 262 lb 3.2 oz (118.9 kg)   01/25/20 260 lb (117.9 kg)     Pt also c/o cold intolerance, this has been going on for a few months. Would like her thyroid checked again. Lab Results   Component Value Date    TSH 0.906 05/16/2019     BPs well controlled. BP Readings from Last 3 Encounters:   08/05/20 128/70   06/22/20 132/72   01/25/20 134/67         Patient Active Problem List   Diagnosis    Hypothyroid    Environmental allergies    Hyperlipidemia with target LDL less than 100    Post menopausal problems    Claustrophobia    Difficulty waking    Morbid obesity with BMI of 40.0-44.9, adult ([address]    ARTIS on CPAP    Insomnia     Past Surgical History:   Procedure Laterality Date    COLONOSCOPY  2007    [place]    EYE SURGERY Right 02/28/2019    Cataract Surgery with Dr. Leslee Lundberg Left 03/13/2019    Cataract Surgery with Dr. Richard Fernandez Left 2016    Dr Natacha Romero Left 1/8/2018    COLONOSCOPY SCREENING performed by Inez Baldwin DO at [facility]     Prior to Admission medications    Medication Sig Start Date End Date Taking?  Authorizing Provider   montelukast (SINGULAIR) 10 MG tablet Take 1 tablet by mouth nightly 7/27/20  Yes Chico Piper DO   levothyroxine (SYNTHROID) 150 MCG tablet TAKE 1 TABLET DAILY 5/27/20  Yes Isabel Re, DO   temazepam (RESTORIL) 15 MG capsule Take 15 mg by mouth nightly as needed for Sleep. Yes Historical Provider, MD   levocetirizine (XYZAL) 5 MG tablet Take 5 mg by mouth nightly   Yes Historical Provider, MD   Lactobacillus (ACIDOPHILUS) 100 MG CAPS Take by mouth   Yes Historical Provider, MD   celecoxib (CELEBREX) 100 MG capsule Take 1 capsule by mouth 2 times daily 8/14/19  Yes Isabel Re, DO   OLIVE LEAF EXTRACT PO Take 2 capsules by mouth daily   Yes Historical Provider, MD   triamcinolone (NASACORT ALLERGY 24HR) 55 MCG/ACT nasal inhaler 2 sprays by Nasal route daily as needed   Yes Historical Provider, MD   fluocinolone (DERMA-SMOOTHE/FS BODY) 0.01 % OIL external oil Apply 5 gtts BID both ears 5/20/19  Yes Isabel Re, DO   vitamin B-12 (CYANOCOBALAMIN) 500 MCG tablet Take 500 mcg by mouth daily Indications: 2500mg a day   Yes Historical Provider, MD   Niacin (VITAMIN B-3 PO) Take by mouth daily    Yes Historical Provider, MD   Magnesium 500 MG CAPS Take by mouth daily    Yes Historical Provider, MD   vitamin E 400 UNIT capsule Take 400 Units by mouth daily   Yes Historical Provider, MD   Cholecalciferol (VITAMIN D3) 33604 UNITS CAPS Take 10,000 Units by mouth daily. Yes Historical Provider, MD   therapeutic multivitamin-minerals (THERAGRAN-M) tablet Take 1 tablet by mouth daily. Yes Historical Provider, MD         Review of Systems   Constitutional: Negative. HENT: Negative. Respiratory: Negative. Cardiovascular: Negative. Gastrointestinal: Negative. Musculoskeletal: Negative. All other systems reviewed and are negative. Objective:   Physical Exam  Vitals signs and nursing note reviewed. Constitutional:       Appearance: She is well-developed. HENT:      Head: Normocephalic and atraumatic.       Right Ear: Tympanic membrane normal.      Left Ear: Tympanic membrane normal.   Cardiovascular:      Rate and Rhythm: Normal rate and regular rhythm. Heart sounds: Normal heart sounds. No murmur. Pulmonary:      Effort: Pulmonary effort is normal.      Breath sounds: Normal breath sounds. Abdominal:      General: Bowel sounds are normal.      Palpations: Abdomen is soft. Skin:     General: Skin is warm and dry. Neurological:      Mental Status: She is alert and oriented to person, place, and time. Psychiatric:         Behavior: Behavior normal.         Assessment:       Diagnosis Orders   1. Increased appetite     2. Morbid obesity with BMI of 40.0-44.9, adult ([facility] [address]     3. Cold intolerance     4. Hypothyroidism, unspecified type  TSH with Reflex   5. Hyperlipidemia with target LDL less than 100  Lipid Panel   6. Fatigue, unspecified type     7.  IFG (impaired fasting glucose)  Hemoglobin A1C           Plan:      -  Chronic medical problems stable  -  Continue current medications  -  Follow up with specialists as scheduled  -  Check labs above, will call with results and recommendations  -  RTO prn for now        Caitlin Sharma DO

## 2020-08-05 NOTE — PROGRESS NOTES
Visit Information    Have you changed or started any medications since your last visit including any over-the-counter medicines, vitamins, or herbal medicines? no   Are you having any side effects from any of your medications? -  no  Have you stopped taking any of your medications? Is so, why? -  no    Have you seen any other physician or provider since your last visit? No  Have you had any other diagnostic tests since your last visit? Yes - Records Obtained  Have you been seen in the emergency room and/or had an admission to a hospital since we last saw you? No  Have you had your routine dental cleaning in the past 6 months? no    Have you activated your [employer] account? If not, what are your barriers?  Yes     Patient Care Team:  Eshter Griffin, DO as PCP - General (Family Medicine)  Esther Griffin, DO as PCP - [facility] Provider    Medical History Review  Past Medical, Family, and Social History reviewed and does contribute to the patient presenting condition    Health Maintenance   Topic Date Due    Hepatitis C screen  1945    Shingles Vaccine (2 of 3) 01/08/2016    Annual Wellness Visit (AWV)  05/29/2019    TSH testing  05/16/2020    Flu vaccine (1) 09/01/2020    Breast cancer screen  11/27/2021    Lipid screen  05/16/2024    Colon cancer screen colonoscopy  01/08/2028    DTaP/Tdap/Td vaccine (2 - Td) 02/25/2030    Pneumococcal 65+ years Vaccine  Completed    DEXA (modify frequency per FRAX score)  Addressed    Hepatitis A vaccine  Aged Out    Hepatitis B vaccine  Aged Out    Hib vaccine  Aged Out    Meningococcal (ACWY) vaccine  Aged Out

## 2020-08-15 ENCOUNTER — NURSE ONLY (OUTPATIENT)
Dept: LAB | Age: 75
End: 2020-08-15

## 2020-08-15 LAB
AVERAGE GLUCOSE: 111 MG/DL (ref 70–126)
CHOLESTEROL, TOTAL: 211 MG/DL (ref 100–199)
HBA1C MFR BLD: 5.7 % (ref 4.4–6.4)
HDLC SERPL-MCNC: 56 MG/DL
LDL CHOLESTEROL CALCULATED: 131 MG/DL
TRIGL SERPL-MCNC: 120 MG/DL (ref 0–199)
TSH SERPL DL<=0.05 MIU/L-ACNC: 0.94 UIU/ML (ref 0.4–4.2)

## 2020-09-10 ENCOUNTER — OFFICE VISIT (OUTPATIENT)
Dept: FAMILY MEDICINE CLINIC | Age: 75
End: 2020-09-10
Payer: MEDICARE

## 2020-09-10 VITALS
WEIGHT: 263.2 LBS | RESPIRATION RATE: 20 BRPM | SYSTOLIC BLOOD PRESSURE: 124 MMHG | HEART RATE: 84 BPM | DIASTOLIC BLOOD PRESSURE: 66 MMHG | TEMPERATURE: 97 F | BODY MASS INDEX: 40.61 KG/M2

## 2020-09-10 PROCEDURE — 99213 OFFICE O/P EST LOW 20 MIN: CPT | Performed by: FAMILY MEDICINE

## 2020-09-10 RX ORDER — CEFADROXIL 500 MG/1
500 CAPSULE ORAL 2 TIMES DAILY
Qty: 20 CAPSULE | Refills: 0 | Status: SHIPPED | OUTPATIENT
Start: 2020-09-10 | End: 2020-09-21 | Stop reason: SDUPTHER

## 2020-09-10 RX ORDER — TRAMADOL HYDROCHLORIDE 50 MG/1
50 TABLET ORAL EVERY 8 HOURS PRN
Qty: 15 TABLET | Refills: 0 | Status: SHIPPED | OUTPATIENT
Start: 2020-09-10 | End: 2020-11-06 | Stop reason: SDUPTHER

## 2020-09-10 ASSESSMENT — ENCOUNTER SYMPTOMS
GASTROINTESTINAL NEGATIVE: 1
RESPIRATORY NEGATIVE: 1

## 2020-09-10 NOTE — PROGRESS NOTES
Yes Historical Provider, MD   fluocinolone (DERMA-SMOOTHE/FS BODY) 0.01 % OIL external oil Apply 5 gtts BID both ears 5/20/19  Yes Salo Laurent,    vitamin B-12 (CYANOCOBALAMIN) 500 MCG tablet Take 500 mcg by mouth daily Indications: 2500mg a day   Yes Historical Provider, MD   Niacin (VITAMIN B-3 PO) Take by mouth daily    Yes Historical Provider, MD   Magnesium 500 MG CAPS Take by mouth daily    Yes Historical Provider, MD   vitamin E 400 UNIT capsule Take 400 Units by mouth daily   Yes Historical Provider, MD   Cholecalciferol (VITAMIN D3) 71354 UNITS CAPS Take 10,000 Units by mouth daily. Yes Historical Provider, MD   therapeutic multivitamin-minerals (THERAGRAN-M) tablet Take 1 tablet by mouth daily. Yes Historical Provider, MD         Review of Systems   Constitutional: Negative. HENT: Negative. Respiratory: Negative. Cardiovascular: Positive for leg swelling. Gastrointestinal: Negative. Musculoskeletal: Negative. Skin: Positive for rash (LLE). All other systems reviewed and are negative. Objective:   Physical Exam  Vitals signs and nursing note reviewed. Constitutional:       Appearance: She is well-developed. HENT:      Head: Normocephalic and atraumatic. Right Ear: Tympanic membrane normal.      Left Ear: Tympanic membrane normal.   Cardiovascular:      Rate and Rhythm: Normal rate and regular rhythm. Heart sounds: Normal heart sounds. No murmur. Pulmonary:      Effort: Pulmonary effort is normal.      Breath sounds: Normal breath sounds. Abdominal:      General: Bowel sounds are normal.      Palpations: Abdomen is soft. Musculoskeletal:      Right lower leg: Edema present. Left lower leg: Edema (trace LE edema bl LEs) present. Skin:     General: Skin is warm and dry. Neurological:      Mental Status: She is alert and oriented to person, place, and time.    Psychiatric:         Behavior: Behavior normal.         Assessment:

## 2020-09-20 ENCOUNTER — PATIENT MESSAGE (OUTPATIENT)
Dept: FAMILY MEDICINE CLINIC | Age: 75
End: 2020-09-20

## 2020-09-21 RX ORDER — CEFADROXIL 500 MG/1
500 CAPSULE ORAL 2 TIMES DAILY
Qty: 20 CAPSULE | Refills: 0 | Status: SHIPPED | OUTPATIENT
Start: 2020-09-21 | End: 2020-10-01

## 2020-09-21 NOTE — TELEPHONE ENCOUNTER
From: Maxime Orellana  To: Khadra Lopez DO  Sent: 9/20/2020 1:27 PM EDT  Subject: Visit Follow-Up Question    Dr. Adrien Teixeira,    On Sept. 10 I came in and we looked at a red, hot, and swollen spot on my left leg. You did not think it was cellulitis, but gave me antibiotic (cefadroxal--500 mg). I have elevated the leg , stopped celebrex , been wearing a compression sock. BUT still have redness, heat, burning and pain if I am on it at all. This is on my replaced knee leg. ... Umair Mitchell I just need to know what to do.     Thank you,  [employer]  [phone]

## 2020-09-29 ENCOUNTER — OFFICE VISIT (OUTPATIENT)
Dept: FAMILY MEDICINE CLINIC | Age: 75
End: 2020-09-29
Payer: MEDICARE

## 2020-09-29 VITALS
SYSTOLIC BLOOD PRESSURE: 126 MMHG | TEMPERATURE: 96.6 F | WEIGHT: 265 LBS | HEART RATE: 76 BPM | DIASTOLIC BLOOD PRESSURE: 68 MMHG | RESPIRATION RATE: 16 BRPM | BODY MASS INDEX: 40.89 KG/M2

## 2020-09-29 PROCEDURE — 99214 OFFICE O/P EST MOD 30 MIN: CPT | Performed by: FAMILY MEDICINE

## 2020-09-29 RX ORDER — FUROSEMIDE 20 MG/1
20 TABLET ORAL DAILY
Qty: 7 TABLET | Refills: 0 | Status: SHIPPED | OUTPATIENT
Start: 2020-09-29 | End: 2020-12-03

## 2020-09-29 ASSESSMENT — ENCOUNTER SYMPTOMS
RESPIRATORY NEGATIVE: 1
GASTROINTESTINAL NEGATIVE: 1

## 2020-09-29 NOTE — PROGRESS NOTES
MCG tablet TAKE 1 TABLET DAILY 5/27/20  Yes Tania Perea, DO   temazepam (RESTORIL) 15 MG capsule Take 15 mg by mouth nightly as needed for Sleep. Yes Historical Provider, MD   levocetirizine (XYZAL) 5 MG tablet Take 5 mg by mouth nightly   Yes Historical Provider, MD   Lactobacillus (ACIDOPHILUS) 100 MG CAPS Take by mouth   Yes Historical Provider, MD   celecoxib (CELEBREX) 100 MG capsule Take 1 capsule by mouth 2 times daily 8/14/19  Yes Tania Perea, DO   OLIVE LEAF EXTRACT PO Take 2 capsules by mouth daily   Yes Historical Provider, MD   triamcinolone (NASACORT ALLERGY 24HR) 55 MCG/ACT nasal inhaler 2 sprays by Nasal route daily as needed   Yes Historical Provider, MD   fluocinolone (DERMA-SMOOTHE/FS BODY) 0.01 % OIL external oil Apply 5 gtts BID both ears 5/20/19  Yes Tania Perea, DO   vitamin B-12 (CYANOCOBALAMIN) 500 MCG tablet Take 500 mcg by mouth daily Indications: 2500mg a day   Yes Historical Provider, MD   Niacin (VITAMIN B-3 PO) Take by mouth daily    Yes Historical Provider, MD   Magnesium 500 MG CAPS Take by mouth daily    Yes Historical Provider, MD   vitamin E 400 UNIT capsule Take 400 Units by mouth daily   Yes Historical Provider, MD   Cholecalciferol (VITAMIN D3) 47260 UNITS CAPS Take 10,000 Units by mouth daily. Yes Historical Provider, MD   therapeutic multivitamin-minerals (THERAGRAN-M) tablet Take 1 tablet by mouth daily. Yes Historical Provider, MD         Review of Systems   Constitutional: Negative. HENT: Negative. Respiratory: Negative. Cardiovascular: Positive for leg swelling. Gastrointestinal: Negative. Musculoskeletal: Positive for myalgias (left leg pain). Skin: Positive for rash. All other systems reviewed and are negative. Objective:   Physical Exam  Vitals signs and nursing note reviewed. Constitutional:       Appearance: She is well-developed. HENT:      Head: Normocephalic and atraumatic.       Right Ear: Tympanic membrane normal.      Left Ear: Tympanic membrane normal.   Neck:      Musculoskeletal: Neck supple. Vascular: No carotid bruit. Cardiovascular:      Rate and Rhythm: Normal rate and regular rhythm. Heart sounds: Normal heart sounds. No murmur. Pulmonary:      Effort: Pulmonary effort is normal.      Breath sounds: Normal breath sounds. Abdominal:      General: Bowel sounds are normal.      Palpations: Abdomen is soft. Musculoskeletal:      Right lower leg: Edema (trace LE edema right) present. Left lower leg: Edema (1+ pitting edema left with area of stasis dermatitis) present. Skin:     General: Skin is warm and dry. Neurological:      Mental Status: She is alert and oriented to person, place, and time. Psychiatric:         Behavior: Behavior normal.             Assessment:       Diagnosis Orders   1. Lower leg edema  furosemide (LASIX) 20 MG tablet   2. Pain and swelling of left lower leg  VL DUP LOWER EXTREMITY VENOUS LEFT   3. Acute stasis dermatitis     4.  Unintended weight gain             Plan:      -  Still feel that this is stasis derm but will rule out DVT at this time  -  Orders above  -  LE elevation, compression hose  -  Lasix for 7 days  -  RTO prn, further recommendations after [address],

## 2020-09-29 NOTE — PROGRESS NOTES
Patient scheduled at [facility] for left lower extremity venous doppler on 9/30/2020 at 9:30am.  Patient to arrive at 9am to 2nd floor heart center. Patient notified at appt.

## 2020-09-29 NOTE — PROGRESS NOTES
Visit Information    Have you changed or started any medications since your last visit including any over-the-counter medicines, vitamins, or herbal medicines? no   Are you having any side effects from any of your medications? -  no  Have you stopped taking any of your medications? Is so, why? -  no    Have you seen any other physician or provider since your last visit? No  Have you had any other diagnostic tests since your last visit? No  Have you been seen in the emergency room and/or had an admission to a hospital since we last saw you? No  Have you had your routine dental cleaning in the past 6 months? yes - 09/2020    Have you activated your [employer] account? If not, what are your barriers?  Yes     Patient Care Team:  Aayush Found, DO as PCP - General (Family Medicine)  Aayush Hernandez, DO as PCP - [facility] Provider    Medical History Review  Past Medical, Family, and Social History reviewed and does not contribute to the patient presenting condition    Health Maintenance   Topic Date Due    Hepatitis C screen  1945    Shingles Vaccine (2 of 3) 01/08/2016    Annual Wellness Visit (AWV)  05/29/2019    Flu vaccine (1) 09/01/2020    A1C test (Diabetic or Prediabetic)  08/15/2021    TSH testing  08/15/2021    Breast cancer screen  11/27/2021    Lipid screen  08/15/2025    Colon cancer screen colonoscopy  01/08/2028    DTaP/Tdap/Td vaccine (2 - Td) 02/25/2030    Pneumococcal 65+ years Vaccine  Completed    DEXA (modify frequency per FRAX score)  Addressed    Hepatitis A vaccine  Aged Out    Hepatitis B vaccine  Aged Out    Hib vaccine  Aged Out    Meningococcal (ACWY) vaccine  Aged Out

## 2020-09-30 ENCOUNTER — HOSPITAL ENCOUNTER (OUTPATIENT)
Dept: INTERVENTIONAL RADIOLOGY/VASCULAR | Age: 75
Discharge: HOME | End: 2020-09-30
Payer: MEDICARE

## 2020-09-30 PROCEDURE — 93971 EXTREMITY STUDY: CPT

## 2020-10-05 ENCOUNTER — PATIENT MESSAGE (OUTPATIENT)
Dept: FAMILY MEDICINE CLINIC | Age: 75
End: 2020-10-05

## 2020-10-05 RX ORDER — BETAMETHASONE DIPROPIONATE 0.5 MG/G
CREAM TOPICAL
Qty: 50 G | Refills: 0 | Status: SHIPPED | OUTPATIENT
Start: 2020-10-05 | End: 2020-12-14 | Stop reason: SDUPTHER

## 2020-10-05 NOTE — TELEPHONE ENCOUNTER
From: Rosaura Orellana  To: Rebekah Pop DO  Sent: 10/5/2020 8:14 AM EDT  Subject: Visit Follow-Up Question    Dr. Cheryle Pandey,    Week 6 of the hot, red spot on my left lower leg. Have taken the cefadroxil, water pills, compression socks and have spent half my life with it propped up high. Test last week did not reveal any blood clot. Is there anything I can do to help it heal? So sorry to be a nag!     [address],  [address]  [phone]

## 2020-11-06 ENCOUNTER — PATIENT MESSAGE (OUTPATIENT)
Dept: FAMILY MEDICINE CLINIC | Age: 75
End: 2020-11-06

## 2020-11-06 RX ORDER — TRAMADOL HYDROCHLORIDE 50 MG/1
50 TABLET ORAL EVERY 8 HOURS PRN
Qty: 30 TABLET | Refills: 0 | Status: SHIPPED | OUTPATIENT
Start: 2020-11-06 | End: 2020-11-16

## 2020-11-06 NOTE — TELEPHONE ENCOUNTER
From: Yang Orellana  To: Long Ellis DO  Sent: 11/6/2020 9:55 AM EST  Subject: Prescription Question    On September 10 you gave me 15 Tramadol 50 mg. I have torn my meniscus on my right knee and have bone on bone and will have a replacement on 29 Dec. Could I have a refill as I am experiencing considerable pain. Thank you.

## 2020-11-18 ENCOUNTER — HOSPITAL ENCOUNTER (OUTPATIENT)
Dept: PHYSICAL THERAPY | Age: 75
Setting detail: THERAPIES SERIES
Discharge: HOME | End: 2020-11-18
Payer: MEDICARE

## 2020-11-18 PROCEDURE — 97110 THERAPEUTIC EXERCISES: CPT

## 2020-11-18 PROCEDURE — 97161 PT EVAL LOW COMPLEX 20 MIN: CPT

## 2020-11-18 NOTE — FLOWSHEET NOTE
** PLEASE SIGN, DATE AND TIME CERTIFICATION BELOW AND RETURN TO [facility] OUTPATIENT REHABILITATION (FAX #: [phone]). ATTEST/CO-SIGN IF ACCESSING VIA [employer]. THANK YOU.**    I certify that I have examined the patient below and determined that Physical Medicine and Rehabilitation service is necessary and that I approve the established plan of care for up to 90 days or as specifically noted. Attestation, signature or co-signature of physician indicates approval of certification requirements.    ________________________ ____________ __________  Physician Signature   Date   Time  [address] [facility]  PHYSICAL THERAPY  [x] EVALUATION  [] DAILY NOTE (LAND) [] DAILY NOTE (AQUATIC ) [] PROGRESS NOTE [] DISCHARGE NOTE    [x] [address] [facility]   [] [place] [address]    [] [facility]   [] [facility]    Date: 2020  Patient Name:  Erwin Odom  : 1945  MRN: [id number]  CSN: [id number]    Referring Practitioner Renuka Washington MD   Diagnosis Unilateral primary osteoarthritis, right knee [M17.11]    Treatment Diagnosis R knee pain, R knee stiffness, Difficulty with ambulation, Unsteadiness, Muscle weakness   Date of Evaluation 20    Additional Pertinent History L TKA 4 years ago, hypothyroid      Functional Outcome Measure Used LEFS   Functional Outcome Score 19/80 (20)       Insurance: Primary: Payor: Samantha Avelar /  /  / ,   Secondary:    Authorization Information: Unlimited visits per medical necessity   Visit # 1, 1/10 for progress note   Visits Allowed: Unlimited   Recertification Date:    Physician Follow-Up: TKA scheduled for 2020   Physician Orders: Strengthening prior to surgery   History of Present Illness: Patient underwent R knee scope on 2020 to clean up the medial meniscus. Patient reports she is bone on bone and will be having a TKA on 2020.  Knee arthroscopy was unsuccessful due to extent of OA. Patient was instructed to complete therapy prior to TKA to strengthen the knee. Patient also has history L TKA 4 years ago. SUBJECTIVE: Patient presents with R knee pain along medial aspect of joint line that wakes her at night. Patient underwent knee scope months ago with ongoing pain and is scheduled for TKA in December. Patient is having functional difficulty with sit<>stand transfers and standing tolerance and walking are limited. Social/Functional History and Current Status:  Medications and Allergies have been reviewed and are listed on Medical History Questionnaire. Myrna Dolan lives with significant other in a single story home with stairs and a handrail to enter. 1 step from garage. Task Previous Current   ADLs  Independent Modified Independent; patient relies on handrails for shower (walk in). Donning socks and shoes on the R is difficult. Patient has to perform ADLs in 10 minute increments for things such as makeup and hair. IADL's Independent Independent   Ambulation Independent; patient was previously able to walk grocery store easily. Patient was cautious on uneven surfaces. Modified Independent; patient now using straight cane for all ambulation and distance is limited; patient was taxed with walking to eval room. Transfers Independent; Patient was having difficulty standing from a chair. Independent; Patient now having trouble from low couch with sit<>stand and notes trouble getting in and out of the car. Recreation Independent; Patient was previously going to the [facility] multiple times per week for exercise classes. Patient no longer able to go to exercise classes. Community Integration Independent Modified Independent   Driving Active  Drives with self-imposed restrictions; Has been having spouse drive her to appointments due to knee pain. Work Retired  Retired     OBJECTIVE:    Pain: 4-5/10 at this time; goes up to 7/10 with walking;  Ice for pain relief and PRN OTC pain meds. Palpation TTP along R medial knee joint line and into medial quadricep. No hamstring or popliteal tenderness. Sensation Intact with patient denying numbness or tingling in R knee/LE. Bed Mobility Indep   Transfers Patient requires increased time to complete and needed cues for LE alignment and technique to reduce strain on R knee. Ambulation Patient ambulates with decreased gait speed, antalgia is evident and patient is showing decreased weight shift onto RLE. Patient is using a cane at this time. Stairs Non reciprocal pattern with UE support. Balance Unsteadiness noted with patient using straight cane, relies on UE support for standing exercises. Special Tests Held special tests due to prior surgery and scheduled TKA.            LOWER EXTREMITY RANGE OF MOTION    Left Right COMMENTS   Hip Flexion      Hip Extension      Hip ABDuction      Hip ADDuction      Hip Internal Rotation      Hip External Rotation      Hip Range of Motion is [place]  [x]      Knee Flexion 90 deg 103 deg    Knee Extension 0 deg Lacking 3 deg     Knee Range of Motion is [facility]  []      Ankle Plantarflexion      Ankle Dorsiflexion      Ankle Inversion      Ankle Eversion      Ankle Range of Motion is WFL  [x]       LE STRENGTH R L   Hip Flexion 4-/5 4+/5   Hip Abduction 4-/5 4/5   Hip Adduction 4+/5 4/5   Knee Flexion 4-/5 4/5   Knee Extension 4/5 4+/5   Ankle DF 4+/5 4+/5         TREATMENT   Precautions:    Pain: R medial knee 4/10 with exercises today     X in shaded column indicates activity completed today   Modalities Parameters/  Location  Notes                     Manual Therapy Time/Technique  Notes                     Exercise/Intervention   Notes   Quad set 10 rep 5 sec x    Heelslides 10 reps  x    SLR 10 reps  x Cues to maintain quad set   Supine Hip Abduction 10 R  x Cues to avoid hip ER   Seated LAQ 10R 5 sec x    Seated HS curl 10R  x Green theraband issued           NuStep 5 minutes  x    Standing march 10B  x    Standing HS curls       Standing knee flexion stretch on step       Standing Balance activities on foam       TKE       Reverse TKE       Step Ups, fwd/lat       Standing 3-way hip                                            Specific Interventions Next Treatment: Initiate standing exercises in parallel bars to promote increased R knee AROM and balance reactions. Sit<>stand transfer training and stair climbing review for safefty after surgery. HEP instruction. Activity/Treatment Tolerance:  [x]  Patient tolerated treatment well  []  Patient limited by fatigue  []  Patient limited by pain   []  Patient limited by medical complications  []  Other:     Assessment: Patient is a 77year old female presenting 4 months status post R knee arthroscopy with TKA scheduled for 12/29/2020. Patient demonstrates decreased B knee AROM (history of L TKA 4 years ago) and weakness is evident in RLE>LLE. Patient is functionally limited in transfer tolerance and demonstrates gait deviation due to pain. Patient is in need of skilled PT to address R knee pain, weakness, and deficits in ROM that contribute to functional deficits and difficulty with ADLs. Body Structures/Functions/Activity Limitations: impaired activity tolerance, impaired balance, impaired endurance, impaired ROM, impaired safety awareness, impaired strength, pain and abnormal gait  Prognosis: good    Short Term Goals:  Time Frame: 3 weeks    1. Patient will report reduction of R knee pain to less than 2/10 with ability to ascend/descend stairs and perform sit<>stand transfers. 2.  Patient will demonstrate increased R knee AROM to 0-120 degrees to improve car transfer and normalize gait pattern. Long Term Goals:  Time Frame: 6 weeks    1. Patient will demonstrate increased LE strength to greater than or equal to 4+/5 to improve overall standing tolerance and ability to stand to perform ADLs at the sink.     2.  Patient will demonstrate improved function on LEFS with score of >50/80. 3.  Patient will be independent with comprehensive HEP. 4.  Patient will ambulate without antalgia, unlimited community distances without need for AD demonstrating normalized knee mechanics. Patient Education:   [x]  HEP/Education Completed: Plan of Care, Goals, Sit<>stand transfer training, importance of strengthening prior to surgery. [employer] Access Code: [email or web address]     []  No new Education completed  []  Reviewed Prior HEP      [x]  Patient verbalized and/or demonstrated understanding of education provided. []  Patient unable to verbalize and/or demonstrate understanding of education provided. Will continue education. []  Barriers to learning:     PLAN:  Treatment Recommendations: Strengthening, Range of Motion, Balance Training, Functional Mobility Training, Transfer Training, Gait Training, Stair Training, Neuromuscular Re-education, Manual Therapy - Soft Tissue Mobilization, Pain Management, Home Exercise Program, Patient Education, Safety Education and Training, Integrative Dry Needling and Modalities    [x]  Plan of care initiated. Plan to see patient 2 times per week for 6 weeks to address the treatment planned outlined above.   []  Continue with current plan of care  []  Modify plan of care as follows:    []  Hold pending physician visit  []  Discharge    Time In 0800   Time Out 0900   Therex 30 min   Eval  30 min   Timed Code Minutes: 30 min   Total Treatment Time: 60 min       Electronically Signed by: Kristin Dawn

## 2020-11-19 ENCOUNTER — TELEPHONE (OUTPATIENT)
Dept: FAMILY MEDICINE CLINIC | Age: 75
End: 2020-11-19

## 2020-11-19 NOTE — TELEPHONE ENCOUNTER
Spoke with patient and she states this has been going on x 3 weeks and is requesting appt.   Appt given

## 2020-11-20 ENCOUNTER — OFFICE VISIT (OUTPATIENT)
Dept: FAMILY MEDICINE CLINIC | Age: 75
End: 2020-11-20
Payer: MEDICARE

## 2020-11-20 VITALS
DIASTOLIC BLOOD PRESSURE: 68 MMHG | BODY MASS INDEX: 39.8 KG/M2 | RESPIRATION RATE: 14 BRPM | HEIGHT: 68 IN | SYSTOLIC BLOOD PRESSURE: 116 MMHG | TEMPERATURE: 97.9 F | WEIGHT: 262.6 LBS | HEART RATE: 72 BPM

## 2020-11-20 LAB
BACTERIA: ABNORMAL /HPF
BILIRUBIN URINE: NEGATIVE
BILIRUBIN, POC: NEGATIVE
BLOOD URINE, POC: ABNORMAL
BLOOD, URINE: NEGATIVE
CASTS 2: ABNORMAL /LPF
CASTS UA: ABNORMAL /LPF
CHARACTER, URINE: CLEAR
CLARITY, POC: CLEAR
COLOR, POC: YELLOW
COLOR: YELLOW
CRYSTALS, UA: ABNORMAL
EPITHELIAL CELLS, UA: ABNORMAL /HPF
GLUCOSE URINE, POC: NEGATIVE
GLUCOSE URINE: NEGATIVE MG/DL
KETONES, POC: NEGATIVE
KETONES, URINE: NEGATIVE
LEUKOCYTE EST, POC: ABNORMAL
LEUKOCYTE ESTERASE, URINE: ABNORMAL
MISCELLANEOUS 2: ABNORMAL
NITRITE, POC: NEGATIVE
NITRITE, URINE: NEGATIVE
PH UA: 6.5 (ref 5–9)
PH, POC: 6.5
PROTEIN UA: NEGATIVE
PROTEIN, POC: NEGATIVE
RBC URINE: ABNORMAL /HPF
RENAL EPITHELIAL, UA: ABNORMAL
SPECIFIC GRAVITY, POC: 1.01
SPECIFIC GRAVITY, URINE: 1 (ref 1–1.03)
UROBILINOGEN, POC: ABNORMAL
UROBILINOGEN, URINE: 0.2 EU/DL (ref 0–1)
WBC UA: ABNORMAL /HPF
YEAST: ABNORMAL

## 2020-11-20 PROCEDURE — 81003 URINALYSIS AUTO W/O SCOPE: CPT | Performed by: FAMILY MEDICINE

## 2020-11-20 PROCEDURE — 99213 OFFICE O/P EST LOW 20 MIN: CPT | Performed by: FAMILY MEDICINE

## 2020-11-20 ASSESSMENT — ENCOUNTER SYMPTOMS
GASTROINTESTINAL NEGATIVE: 1
RESPIRATORY NEGATIVE: 1

## 2020-11-20 NOTE — PROGRESS NOTES
Subjective:      Patient ID: Karma Ponce is a 77 y.o. female. HPI:    Chief Complaint   Patient presents with    Flank Pain     Pt presents c/o r flank pain that started 3 weeks ago. She has no urinary sxs and has been drinking more fluids. She did just finish a regimen of bactrim and prednisone prescribed by Dr. Ce Hickey. Pt here for right flank pain x 3 weeks. Denies dysuria, frequency, urgency. Pain worse with movement. BMs regular. She was recently on Bactrim and prednisone by  Dr. Ce Hickey for chronic sinus issues. Has follow up on the 18th. No blood in urine. No NV, FC. Pain level 2-4/10. Patient Active Problem List   Diagnosis    Hypothyroid    Environmental allergies    Hyperlipidemia with target LDL less than 100    Post menopausal problems    Claustrophobia    Difficulty waking    Morbid obesity with BMI of 40.0-44.9, adult ([address]    ARTIS on CPAP    Insomnia     Past Surgical History:   Procedure Laterality Date    COLONOSCOPY  2007    [place]    EYE SURGERY Right 02/28/2019    Cataract Surgery with Dr. Maksim Hernandez Left 03/13/2019    Cataract Surgery with Dr. Familia Lucia Left 2016    Dr Rafi Robles Left 1/8/2018    COLONOSCOPY SCREENING performed by Eran Decker DO at [facility]     Prior to Admission medications    Medication Sig Start Date End Date Taking? Authorizing Provider   furosemide (LASIX) 20 MG tablet Take 1 tablet by mouth daily 9/29/20  Yes Verónica Smith DO   montelukast (SINGULAIR) 10 MG tablet Take 1 tablet by mouth nightly 7/27/20  Yes Verónica Smith DO   levothyroxine (SYNTHROID) 150 MCG tablet TAKE 1 TABLET DAILY 5/27/20  Yes Verónica Smith DO   temazepam (RESTORIL) 15 MG capsule Take 15 mg by mouth nightly as needed for Sleep.    Yes Historical Provider, MD   levocetirizine (XYZAL) 5 MG tablet Take 5 mg by mouth nightly   Yes Historical Provider, MD Pulmonary:      Effort: Pulmonary effort is normal.      Breath sounds: Normal breath sounds. No wheezing, rhonchi or rales. Abdominal:      General: There is no distension. Tenderness: There is right CVA tenderness. Skin:     General: Skin is warm and dry. Findings: No rash (on exposed surfaces). Neurological:      General: No focal deficit present. Mental Status: She is alert. Psychiatric:         Attention and Perception: Attention normal.         Mood and Affect: Mood normal.         Speech: Speech normal.         Behavior: Behavior normal. Behavior is cooperative. Thought Content: Thought content normal.         Judgment: Judgment normal.         Assessment:       Diagnosis Orders   1.  Acute right flank pain  POCT Urinalysis No Micro (Auto)    Urinalysis Reflex to Culture           Plan:      -  UA with trace blood and LE's, likely false +  -  Will send for culture  -  MSK likely, she is tender to touch and pain worse with certain positions  -  Will restart Celebrex prn  -  RTO prn        Dianne Wood, DO

## 2020-11-24 ENCOUNTER — HOSPITAL ENCOUNTER (OUTPATIENT)
Dept: PHYSICAL THERAPY | Age: 75
Setting detail: THERAPIES SERIES
Discharge: HOME | End: 2020-11-24
Payer: MEDICARE

## 2020-11-24 PROCEDURE — 97110 THERAPEUTIC EXERCISES: CPT

## 2020-11-24 NOTE — FLOWSHEET NOTE
[address] [facility]  PHYSICAL THERAPY  [] EVALUATION  [x] DAILY NOTE (LAND) [] DAILY NOTE (AQUATIC ) [] PROGRESS NOTE [] DISCHARGE NOTE    [x] OUTPATIENT REHABILITATION CENTER [facility]   [] [place] [address]    [] [facility]   [] Pinky Lefort    Date: 2020  Patient Name:  Kristina Oconnell  : 1945  MRN: [id number]  CSN: [id number]    Referring Practitioner Antonio Mejias MD   Diagnosis Unilateral primary osteoarthritis, right knee [M17.11]    Treatment Diagnosis R knee pain, R knee stiffness, Difficulty with ambulation, Unsteadiness, Muscle weakness   Date of Evaluation 20    Additional Pertinent History L TKA 4 years ago, hypothyroid      Functional Outcome Measure Used LEFS   Functional Outcome Score  (20)       Insurance: Primary: Payor: Daniel Méndez /  /  / ,   Secondary:    Authorization Information: Unlimited visits per medical necessity   Visit # 2, 2/10 for progress note   Visits Allowed: Unlimited   Recertification Date:    Physician Follow-Up: TKA scheduled for 2020   Physician Orders: Strengthening prior to surgery   History of Present Illness: Patient underwent R knee scope on 2020 to clean up the medial meniscus. Patient reports she is bone on bone and will be having a TKA on 2020. Knee arthroscopy was unsuccessful due to extent of OA. Patient was instructed to complete therapy prior to TKA to strengthen the knee. Patient also has history L TKA 4 years ago. SUBJECTIVE: Patient reports she has been doing HEP 3x/day and it is going well. She has been doing them with her  who is also benefiting from them. Pain in R knee remains along medial joint line and is rated 1-2/10.          TREATMENT   Precautions:    Pain: R medial knee 1-2/10 with exercises today     X in shaded column indicates activity completed today   Modalities Parameters/  Location  Notes                     Manual Therapy Time/Technique  Notes                     Exercise/Intervention   Notes   Quad set 10 rep 5 sec x    Heelslides 10 reps  x    SLR 10 reps  x Cues to maintain quad set   Supine Hip Abduction 10 R  x Cues to avoid hip ER   Seated LAQ 10 R 5 sec x    Seated HS curl 10 R   x Green theraband issued           NuStep 5 minutes  x    Standing HR/TR [address]  x    Standing march 10B  x    Standing HS curls 10B  x Parallel bar support cues to avoid leaning. Standing knee flexion stretch on step 3R 20 sec     Standing Balance activities on foam       TKE       Reverse TKE       Step Ups, fwd/lat       Standing 3-way hip 10B  x Cues for terminal knee extension and to avoid trunk lean   Dynamic Gait- sidestepping, HS curls, marching, tandem 2 laps   x Back hallway with intermittent railing support                                 Specific Interventions Next Treatment:  Sit<>stand transfer training and stair climbing review for safefty after surgery. HEP instruction. Standing foam balance activities. Activity/Treatment Tolerance:  [x]  Patient tolerated treatment well  []  Patient limited by fatigue  []  Patient limited by pain   []  Patient limited by medical complications  []  Other:     Assessment:  Patient had good tolerance to addition of standing BLE strengthening exercises and dynamic gait challenges in the hallway. Patient did require use of handrail and parallel bars due to balance deficits in positions of single limb support. Patient demonstrates ongoing R knee ROM deficits in flexion greater than extension. Patient did demonstrate good carryover of education for sit<>stand training provided at the evaluation to reduce UE support. Body Structures/Functions/Activity Limitations: impaired activity tolerance, impaired balance, impaired endurance, impaired ROM, impaired safety awareness, impaired strength, pain and abnormal gait  Prognosis: good    Short Term Goals:  Time Frame: 3 weeks    1.  Patient will report Electronically Signed by: Aleida Ayala PT, DPT [id number]

## 2020-12-01 ENCOUNTER — TELEPHONE (OUTPATIENT)
Dept: CARDIOLOGY CLINIC | Age: 75
End: 2020-12-01

## 2020-12-02 ENCOUNTER — HOSPITAL ENCOUNTER (OUTPATIENT)
Dept: PHYSICAL THERAPY | Age: 75
Setting detail: THERAPIES SERIES
Discharge: HOME | End: 2020-12-02
Payer: MEDICARE

## 2020-12-02 PROCEDURE — 97110 THERAPEUTIC EXERCISES: CPT

## 2020-12-02 ASSESSMENT — ENCOUNTER SYMPTOMS
WHEEZING: 0
EYES NEGATIVE: 1
DIARRHEA: 0
BACK PAIN: 0
CHEST TIGHTNESS: 0
SHORTNESS OF BREATH: 0
STRIDOR: 0
COUGH: 0
NAUSEA: 0
ALLERGIC/IMMUNOLOGIC NEGATIVE: 1

## 2020-12-02 NOTE — PROGRESS NOTES
[address] [facility]  PHYSICAL THERAPY  [] EVALUATION  [x] DAILY NOTE (LAND) [] DAILY NOTE (AQUATIC ) [] PROGRESS NOTE [] DISCHARGE NOTE    [x] OUTPATIENT REHABILITATION CENTER [facility]   [] [place] [address]    [] [facility]   [] Ella Lights    Date: 2020  Patient Name:  Rhonda Tavarez  : 1945  MRN: [id number]  CSN: [id number]    Referring Practitioner Una Polk MD   Diagnosis Unilateral primary osteoarthritis, right knee [M17.11]    Treatment Diagnosis R knee pain, R knee stiffness, Difficulty with ambulation, Unsteadiness, Muscle weakness   Date of Evaluation 20    Additional Pertinent History L TKA 4 years ago, hypothyroid      Functional Outcome Measure Used LEFS   Functional Outcome Score 19 (20)       Insurance: Primary: Payor: Melissa Shaffer /  /  / ,   Secondary:    Authorization Information: Unlimited visits per medical necessity   Visit # 3, 3/10 for progress note   Visits Allowed: Unlimited   Recertification Date:    Physician Follow-Up: TKA scheduled for 2020   Physician Orders: Strengthening prior to surgery   History of Present Illness: Patient underwent R knee scope on 2020 to clean up the medial meniscus. Patient reports she is bone on bone and will be having a TKA on 2020. Knee arthroscopy was unsuccessful due to extent of OA. Patient was instructed to complete therapy prior to TKA to strengthen the knee. Patient also has history L TKA 4 years ago. SUBJECTIVE: Patient reports to be doing 20 repetitions of all exercises at home. Patient does report pain is 3/10 now surrounding the knee. She is pleased to report that with overall decrease in pain level with walking she is able to go farther distances. She is down to only intermittently taking pain medication. Patient has all her pre-op testing next week prior to R TKA end of month.        TREATMENT   Precautions:    Pain: R medial knee 3/10 with exercises today     X in shaded column indicates activity completed today   Modalities Parameters/  Location  Notes                     Manual Therapy Time/Technique  Notes                     Exercise/Intervention   Notes   Quad set 10 rep 5 sec     Heelslides 10 reps      SLR 10 reps      Supine Hip Abduction 10 R      Seated LAQ 10 R 5 sec     Seated HS curl 10 R    Green theraband issued           NuStep 5 minutes Level 3 x    Standing HR/TR [address] e[address] way  x    Standing march 20B  x    Standing HS curls 20B  x Parallel bar support cues to avoid leaning. Standing knee flexion stretch on step 3R 20 sec x    Standing Balance activities on foam       TKE 10R 5\" x Cable column 20lbs   Reverse TKE       Step Ups, fwd/lat 10B 6-inch step x Single UE support   Standing 3-way hip 20B  x Single UE support   Dynamic Gait- sidestepping, HS curls, marching, tandem 2 laps   x Sidestepping in modified squat position   NK Table 10R each way 2.5lb x                           Specific Interventions Next Treatment: Progress standing strengthening exercises. Standing foam balance activities. Activity/Treatment Tolerance:  [x]  Patient tolerated treatment well  []  Patient limited by fatigue  []  Patient limited by pain   []  Patient limited by medical complications  []  Other:     Assessment:  Patient is most challenged with positions of single limb support on RLE due to instability in the knee joint. Due to ongoing quad weakness, TKE at cable column was challenging for patient with gluteal substitutions noted. Patient is highly motivated to progress strengthening program leading up to surgery. Patient demonstrates decreased eccentric lowering control with step up activities today and relied on UE support for safety. Ongoing skilled PT is necessary to address pain complaints, strengthen R knee and global LE to improve standing and walking tolerance for optimal post-surgical outcome.      Body Structures/Functions/Activity Limitations: impaired activity tolerance, impaired balance, impaired endurance, impaired ROM, impaired safety awareness, impaired strength, pain and abnormal gait  Prognosis: good    Short Term Goals:  Time Frame: 3 weeks    1. Patient will report reduction of R knee pain to less than 2/10 with ability to ascend/descend stairs and perform sit<>stand transfers. 2.  Patient will demonstrate increased R knee AROM to 0-120 degrees to improve car transfer and normalize gait pattern. Long Term Goals:  Time Frame: 6 weeks    1. Patient will demonstrate increased LE strength to greater than or equal to 4+/5 to improve overall standing tolerance and ability to stand to perform ADLs at the sink. 2.  Patient will demonstrate improved function on LEFS with score of >50/80. 3.  Patient will be independent with comprehensive HEP. 4.  Patient will ambulate without antalgia, unlimited community distances without need for AD demonstrating normalized knee mechanics. Patient Education:   []  HEP/Education Completed:   [address] [place] [address] Access Code: [email or web address]     []  No new Education completed  [x]  Reviewed Prior HEP      [x]  Patient verbalized and/or demonstrated understanding of education provided. []  Patient unable to verbalize and/or demonstrate understanding of education provided. Will continue education. []  Barriers to learning:     PLAN:  Treatment Recommendations: Strengthening, Range of Motion, Balance Training, Functional Mobility Training, Transfer Training, Gait Training, Stair Training, Neuromuscular Re-education, Manual Therapy - Soft Tissue Mobilization, Pain Management, Home Exercise Program, Patient Education, Safety Education and Training, Integrative Dry Needling and Modalities    [x]  Plan of care initiated. Plan to see patient 2 times per week for 6 weeks to address the treatment planned outlined above.   []  Continue with current plan of care  []  Modify plan of care as follows:    [] Hold pending physician visit  []  Discharge    Time In 1500   Time Out 1545   Timed Code Minutes: 45 min   Total Treatment Time: 45 min       Electronically Signed by: Digna Gleason PT, DPT [id number]

## 2020-12-02 NOTE — PROGRESS NOTES
[facility]      Marjorie Muhammad         [id number]  12/3/2020   Chief Complaint   Patient presents with    Follow-up     ARTIS          PAP Download:   Original or initialAHI: 27.9    Date of initial study: 3-22-16    Compliant  100%     Noncompliant 0 %     PAP Type CPAP Level  10   Avg Hrs/Day 9hr  AHI: 1.0     Machine/Mfg:   [x] ResMed    [] Respironics/Dreamstation   Interface:   [x] Nasal    [] Nasal pillows   [] FFM      Provider:      [] -CLYDE     []Kallie     [] Aylin    [x] Annamaria Hager    [] Holley               [] [facility] [facility]      [] Adaptive    [] [address]      [] Other    Here is a scan of the most recent download:          [place] Sleepiness Score:   Sitting and readin  Watching TV:0  Sitting inactive in a public place:0  Being a passenger in a motor vehicle for an hour or more:0  Lying down in the afternoon:3  Sitting and talking to someone:0  Sitting quietly after lunch (no alcohol):1  Stopped for a few minutes in traffic while drivin  Total Score:4    Presentation:   Garo Cottrell presents for sleep medicine follow up for obstructive sleep apnea  Since the last visit, Garo Cottrell is doing well with PAP. Here for 1 year follow up. She is sleeping well and feels rested. Equipment issues: The pressure is  acceptable, the mask is acceptable     Sleep issues:  Do you feel better? Yes  More rested? Yes   Better concentration? yes    Progress History:   Since last visit any new medical issues? No  New ER or hospitlal visits? No  Any new or changes in medicines? No  Any new sleep medicines?  No        Past Medical History:   Diagnosis Date    Allergic rhinitis     Chronic kidney disease     Claustrophobia 2016    Hyperlipidemia     Obesity     ARTIS on CPAP        Past Surgical History:   Procedure Laterality Date    COLONOSCOPY      Wisser    EYE SURGERY Right 2019    Cataract Surgery with Dr. Josué Mejia Left 2019    Cataract Surgery with Dr. Churchill Diver 2016    Dr Susan Granger Left 2018    COLONOSCOPY SCREENING performed by Angel Mcmullen DO at [facility]       Social History     Tobacco Use    Smoking status: Former Smoker     Packs/day: 0.75     Years: 5.00     Pack years: 3.75     Types: Cigarettes     Last attempt to quit: 1996     Years since quittin.6    Smokeless tobacco: Never Used    Tobacco comment: quit    Substance Use Topics    Alcohol use: Yes     Comment: socially    Drug use: No       Allergies   Allergen Reactions    Naproxen Swelling       Current Outpatient Medications   Medication Sig Dispense Refill    furosemide (LASIX) 20 MG tablet Take 1 tablet by mouth daily 7 tablet 0    montelukast (SINGULAIR) 10 MG tablet Take 1 tablet by mouth nightly 90 tablet 3    levothyroxine (SYNTHROID) 150 MCG tablet TAKE 1 TABLET DAILY 90 tablet 3    temazepam (RESTORIL) 15 MG capsule Take 15 mg by mouth nightly as needed for Sleep.  levocetirizine (XYZAL) 5 MG tablet Take 5 mg by mouth nightly      Lactobacillus (ACIDOPHILUS) 100 MG CAPS Take by mouth      celecoxib (CELEBREX) 100 MG capsule Take 1 capsule by mouth 2 times daily 180 capsule 3    OLIVE LEAF EXTRACT PO Take 2 capsules by mouth daily      triamcinolone (NASACORT ALLERGY 24HR) 55 MCG/ACT nasal inhaler 2 sprays by Nasal route daily as needed      fluocinolone (DERMA-SMOOTHE/FS BODY) 0.01 % OIL external oil Apply 5 gtts BID both ears 118 mL 0    vitamin B-12 (CYANOCOBALAMIN) 500 MCG tablet Take 500 mcg by mouth daily Indications: 2500mg a day      Niacin (VITAMIN B-3 PO) Take by mouth daily       Magnesium 500 MG CAPS Take by mouth daily       vitamin E 400 UNIT capsule Take 400 Units by mouth daily      Cholecalciferol (VITAMIN D3) 45413 UNITS CAPS Take 10,000 Units by mouth daily.  therapeutic multivitamin-minerals (THERAGRAN-M) tablet Take 1 tablet by mouth daily. No current facility-administered medications for this visit. Family History   Problem Relation Age of Onset    Dementia Mother     Diabetes Father         Review of Systems -   Review of Systems   Constitutional: Negative for activity change, appetite change, chills and fever. HENT: Negative for congestion and postnasal drip. Eyes: Negative. Respiratory: Negative for cough, chest tightness, shortness of breath, wheezing and stridor. Cardiovascular: Negative for chest pain and leg swelling. Gastrointestinal: Negative for diarrhea and nausea. Endocrine: Negative. Genitourinary: Negative. Musculoskeletal: Positive for arthralgias. Negative for back pain. Skin: Negative. Allergic/Immunologic: Negative. Neurological: Negative. Negative for dizziness and light-headedness. Psychiatric/Behavioral: Negative. All other systems reviewed and are negative. Physical Exam:    BMI:  There is no height or weight on file to calculate BMI. Wt Readings from Last 3 Encounters:   11/20/20 262 lb 9.6 oz (119.1 kg)   09/29/20 265 lb (120.2 kg)   09/10/20 263 lb 3.2 oz (119.4 kg)     Weight stable / unchanged  Vitals: There were no vitals taken for this visit. Physical Exam  Constitutional:       Appearance: She is well-developed. HENT:      Head: Normocephalic and atraumatic. Right Ear: External ear normal.      Left Ear: External ear normal.   Eyes:      Conjunctiva/sclera: Conjunctivae normal.      Pupils: Pupils are equal, round, and reactive to light. Neck:      Musculoskeletal: Normal range of motion and neck supple. Cardiovascular:      Rate and Rhythm: Normal rate and regular rhythm. Heart sounds: Normal heart sounds. Pulmonary:      Effort: Pulmonary effort is normal.      Breath sounds: Normal breath sounds. Musculoskeletal: Normal range of motion. Skin:     General: Skin is warm and dry.    Neurological:      Mental Status: She is alert and and seek emergency medical treatment and/or call 911 if deemed necessary. Services were provided through a video synchronous discussion virtually to substitute for in-person clinic visit. Patient and provider were located at their individual homes. Patient identification was verified at the start of the visit. Total time spent on this encounter was 15 min     Berta Martinez PA-C MPAS  12/3/2020      An electronic signature was used to authenticate this note.

## 2020-12-02 NOTE — TELEPHONE ENCOUNTER
Stress test and echo scheduled 12-16-20  Pt informed, date, time, and instructions reviewed and mailed to pt

## 2020-12-03 ENCOUNTER — TELEMEDICINE (OUTPATIENT)
Dept: PULMONOLOGY | Age: 75
End: 2020-12-03
Payer: MEDICARE

## 2020-12-03 PROCEDURE — 99213 OFFICE O/P EST LOW 20 MIN: CPT | Performed by: PHYSICIAN ASSISTANT

## 2020-12-04 ENCOUNTER — HOSPITAL ENCOUNTER (OUTPATIENT)
Dept: PHYSICAL THERAPY | Age: 75
Setting detail: THERAPIES SERIES
Discharge: HOME | End: 2020-12-04
Payer: MEDICARE

## 2020-12-04 PROCEDURE — 97110 THERAPEUTIC EXERCISES: CPT

## 2020-12-04 NOTE — PROGRESS NOTES
[address] [facility]  PHYSICAL THERAPY  [] EVALUATION  [x] DAILY NOTE (LAND) [] DAILY NOTE (AQUATIC ) [] PROGRESS NOTE [] DISCHARGE NOTE    [x] OUTPATIENT REHABILITATION CENTER [facility]   [] [place] [address]    [] [facility]   [] Pinky Lefort    Date: 2020  Patient Name:  Kristina Oconnell  : 1945  MRN: [id number]  CSN: [id number]    Referring Practitioner Antonio Mejias MD   Diagnosis Unilateral primary osteoarthritis, right knee [M17.11]    Treatment Diagnosis R knee pain, R knee stiffness, Difficulty with ambulation, Unsteadiness, Muscle weakness   Date of Evaluation 20    Additional Pertinent History L TKA 4 years ago, hypothyroid      Functional Outcome Measure Used LEFS   Functional Outcome Score  (20)       Insurance: Primary: Payor: Daniel Méndez /  /  / ,   Secondary:    Authorization Information: Unlimited visits per medical necessity   Visit # 4, 4/10 for progress note   Visits Allowed: Unlimited   Recertification Date:    Physician Follow-Up: TKA scheduled for 2020   Physician Orders: Strengthening prior to surgery   History of Present Illness: Patient underwent R knee scope on 2020 to clean up the medial meniscus. Patient reports she is bone on bone and will be having a TKA on 2020. Knee arthroscopy was unsuccessful due to extent of OA. Patient was instructed to complete therapy prior to TKA to strengthen the knee. Patient also has history L TKA 4 years ago. SUBJECTIVE: Patient feels she is gaining strength and is pleased with her progress. Good compliance with home program. Patient rates current pain 2/10.        TREATMENT   Precautions:    Pain: R medial knee 4/10 with exercises today     X in shaded column indicates activity completed today   Modalities Parameters/  Location  Notes                     Manual Therapy Time/Technique  Notes                     Exercise/Intervention   Notes   Quad set 10 rep 5 sec     Heelslides 10 reps      SLR 10 reps      Supine Hip Abduction 10 R      Seated LAQ 10 R 5 sec     Seated HS curl 10 R    Green theraband issued           NuStep 5 minutes S9, Level3 x    Standing HR/TR 20 ea way  x On foam   Standing march 20B  x On foam   Standing HS curls 20B  x On foam   Standing step stretches, knee flexion and hamstring  3B 20 sec x    Standing Balance activities on foam       TKE 15R 5\" x Cable column 20lbs   Reverse TKE       Step Ups, fwd/lat 15B 6-inch step x Single UE support   Standing 3-way hip 20B  x B UE support, on foam    Dynamic Gait- sidestepping, HS curls, marching, tandem 2 laps   x Sidestepping in modified squat position- cues for form   NK Table 15R each way 2.5lb x    Hydro stick; forward and backward, cues for quad control  15 each way  x                    Specific Interventions Next Treatment: Progress standing strengthening exercises. Standing foam balance activities. Activity/Treatment Tolerance:  [x]  Patient tolerated treatment well  []  Patient limited by fatigue  []  Patient limited by pain   []  Patient limited by medical complications  []  Other:     Assessment: Patient is challenged with today's treatment session and exercise progressions fairly well but does note a gradual increase in pain by conclusion of treatment session. Patient is motivated to progress program to promote optimal strengthening gains. Short Term Goals:  Time Frame: 3 weeks    1. Patient will report reduction of R knee pain to less than 2/10 with ability to ascend/descend stairs and perform sit<>stand transfers. 2.  Patient will demonstrate increased R knee AROM to 0-120 degrees to improve car transfer and normalize gait pattern. Long Term Goals:  Time Frame: 6 weeks    1. Patient will demonstrate increased LE strength to greater than or equal to 4+/5 to improve overall standing tolerance and ability to stand to perform ADLs at the sink.     2.  Patient will demonstrate improved function on LEFS with score of >50/80. 3.  Patient will be independent with comprehensive HEP. 4.  Patient will ambulate without antalgia, unlimited community distances without need for AD demonstrating normalized knee mechanics. Patient Education:   []  HEP/Education Completed:   [address] Access Code: [email or web address]     []  No new Education completed  [x]  Reviewed Prior HEP      [x]  Patient verbalized and/or demonstrated understanding of education provided. []  Patient unable to verbalize and/or demonstrate understanding of education provided. Will continue education. []  Barriers to learning:     PLAN:  Treatment Recommendations: Strengthening, Range of Motion, Balance Training, Functional Mobility Training, Transfer Training, Gait Training, Stair Training, Neuromuscular Re-education, Manual Therapy - Soft Tissue Mobilization, Pain Management, Home Exercise Program, Patient Education, Safety Education and Training, Integrative Dry Needling and Modalities    [x]  Plan of care initiated. Plan to see patient 2 times per week for 6 weeks to address the treatment planned outlined above.   []  Continue with current plan of care  []  Modify plan of care as follows:    []  Hold pending physician visit  []  Discharge    Time In 1123   Time Out 1208   Timed Code Minutes: 45 min   Total Treatment Time: 45 min       Electronically Signed by: Sveta Bowman

## 2020-12-09 ENCOUNTER — HOSPITAL ENCOUNTER (OUTPATIENT)
Dept: PHYSICAL THERAPY | Age: 75
Setting detail: THERAPIES SERIES
Discharge: HOME | End: 2020-12-09
Payer: MEDICARE

## 2020-12-09 PROCEDURE — 97110 THERAPEUTIC EXERCISES: CPT

## 2020-12-09 NOTE — PROGRESS NOTES
[address] [facility]  PHYSICAL THERAPY  [] EVALUATION  [x] DAILY NOTE (LAND) [] DAILY NOTE (AQUATIC ) [] PROGRESS NOTE [] DISCHARGE NOTE    [x] OUTPATIENT REHABILITATION CENTER [facility]   [] [place] [address]    [] [facility]   [] [place]    Date: 2020  Patient Name:  Efrem Balbuena  : 1945  MRN: [id number]  CSN: [id number]    Referring Practitioner Roselia Stevens MD   Diagnosis Unilateral primary osteoarthritis, right knee [M17.11]    Treatment Diagnosis R knee pain, R knee stiffness, Difficulty with ambulation, Unsteadiness, Muscle weakness   Date of Evaluation 20    Additional Pertinent History L TKA 4 years ago, hypothyroid      Functional Outcome Measure Used LEFS   Functional Outcome Score  (20)       Insurance: Primary: Payor: [employer] /  /  / ,   Secondary:    Authorization Information: Unlimited visits per medical necessity   Visit # 5, 5/10 for progress note   Visits Allowed: Unlimited   Recertification Date: 10/96/0421   Physician Follow-Up: TKA scheduled for 2020   Physician Orders: Strengthening prior to surgery   History of Present Illness: Patient underwent R knee scope on 2020 to clean up the medial meniscus. Patient reports she is bone on bone and will be having a TKA on 2020. Knee arthroscopy was unsuccessful due to extent of OA. Patient was instructed to complete therapy prior to TKA to strengthen the knee. Patient also has history L TKA 4 years ago. SUBJECTIVE:  Patient reports that doing the HEP seems to be managing symptoms very well and she is eager to get replacement later this month. Patient had pre-operative testing done yesterday. Patient has not felt the need to use ice recently as pain has been well controlled.        TREATMENT   Precautions:    Pain: R medial knee /10 with exercises today     X in shaded column indicates activity completed today   Modalities Parameters/  Location  Notes                     Manual Therapy Time/Technique  Notes                     Exercise/Intervention   Notes   Quad set 10 rep 5 sec     Heelslides 10 reps      SLR 10 reps      Supine Hip Abduction 10 R      Seated LAQ 10 R 5 sec     Seated HS curl 10 R    Green theraband issued           NuStep 5 minutes S9, Level3 x    Standing HR/TR 20 ea way  x On foam   Standing march 20B  x On foam   Standing HS curls 20B  x On foam   Standing step stretches, knee flexion and hamstring  3B 20 sec x    Standing Balance activities on foam 3x ea way 20 sec  NBOS, tandem   TKE 15R 5\" x Cable column 20lbs   Reverse TKE       Step Ups, fwd/lat 15B 6-inch step x Single UE support   Standing 3-way hip 20B  x B UE support, on foam    Dynamic Gait- sidestepping, HS curls, marching, tandem 2 laps   x Sidestepping in modified squat position- cues for form   NK Table 15R each way 2.5lb ext/5lb flex x    Hydro stick; forward and backward, cues for quad control  15 each way  x                    Specific Interventions Next Treatment: Progress standing strengthening exercises. Standing foam balance activities. Activity/Treatment Tolerance:  [x]  Patient tolerated treatment well  []  Patient limited by fatigue  []  Patient limited by pain   []  Patient limited by medical complications  []  Other:     Assessment: Patient did well with exercises today but did need cues to maintain terminal knee extension with 3-way hip and to perform exercises more slowly and with increased eccentric control. Patient remains challenged with step up activities more so in frontal plane with ongoing UE support required and patient needing cues to avoid uncontrolled descent. Attempted to increase resistance for NK table; however, patient noted onset of pain in R medial knee with this so weight was again decreased to previous level for extension.  Ongoing skilled PT is beneficial to address R knee strength prior to surgery at the end of the month. Short Term Goals:  Time Frame: 3 weeks    1. Patient will report reduction of R knee pain to less than 2/10 with ability to ascend/descend stairs and perform sit<>stand transfers. 2.  Patient will demonstrate increased R knee AROM to 0-120 degrees to improve car transfer and normalize gait pattern. Long Term Goals:  Time Frame: 6 weeks    1. Patient will demonstrate increased LE strength to greater than or equal to 4+/5 to improve overall standing tolerance and ability to stand to perform ADLs at the sink. 2.  Patient will demonstrate improved function on LEFS with score of >50/80. 3.  Patient will be independent with comprehensive HEP. 4.  Patient will ambulate without antalgia, unlimited community distances without need for AD demonstrating normalized knee mechanics. Patient Education:   [x]  HEP/Education Completed: Sit<>stand transfer technique  [employer] [facility] Access Code: [email or web address]    []  No new Education completed  []  Reviewed Prior HEP      [x]  Patient verbalized and/or demonstrated understanding of education provided. []  Patient unable to verbalize and/or demonstrate understanding of education provided. Will continue education. []  Barriers to learning:     PLAN:  Treatment Recommendations: Strengthening, Range of Motion, Balance Training, Functional Mobility Training, Transfer Training, Gait Training, Stair Training, Neuromuscular Re-education, Manual Therapy - Soft Tissue Mobilization, Pain Management, Home Exercise Program, Patient Education, Safety Education and Training, Integrative Dry Needling and Modalities    []  Plan of care initiated. Plan to see patient 2 times per week for 6 weeks to address the treatment planned outlined above.   [x]  Continue with current plan of care  []  Modify plan of care as follows:    []  Hold pending physician visit  []  Discharge    Time In 1300   Time Out 1348   Timed Code Minutes: 48 min   Total Treatment Time: 48 min

## 2020-12-11 ENCOUNTER — HOSPITAL ENCOUNTER (OUTPATIENT)
Dept: PHYSICAL THERAPY | Age: 75
Setting detail: THERAPIES SERIES
Discharge: HOME | End: 2020-12-11
Payer: MEDICARE

## 2020-12-11 PROCEDURE — 97110 THERAPEUTIC EXERCISES: CPT

## 2020-12-11 NOTE — PROGRESS NOTES
[address] [facility]  PHYSICAL THERAPY  [] EVALUATION  [x] DAILY NOTE (LAND) [] DAILY NOTE (AQUATIC ) [] PROGRESS NOTE [] DISCHARGE NOTE    [x] OUTPATIENT REHABILITATION CENTER [facility]   [] [place] [address]    [] [facility]   [] [employer]    Date: 2020  Patient Name:  Brad Torres  : 1945  MRN: [id number]  CSN: [id number]    Referring Practitioner Gerri Gomez MD   Diagnosis Unilateral primary osteoarthritis, right knee [M17.11]    Treatment Diagnosis R knee pain, R knee stiffness, Difficulty with ambulation, Unsteadiness, Muscle weakness   Date of Evaluation 20    Additional Pertinent History L TKA 4 years ago, hypothyroid      Functional Outcome Measure Used LEFS   Functional Outcome Score  (20)       Insurance: Primary: Payor: [facility] /  /  / ,   Secondary:    Authorization Information: Unlimited visits per medical necessity   Visit # 6, /10 for progress note   Visits Allowed: Unlimited   Recertification Date:    Physician Follow-Up: TKA scheduled for 2020   Physician Orders: Strengthening prior to surgery   History of Present Illness: Patient underwent R knee scope on 2020 to clean up the medial meniscus. Patient reports she is bone on bone and will be having a TKA on 2020. Knee arthroscopy was unsuccessful due to extent of OA. Patient was instructed to complete therapy prior to TKA to strengthen the knee. Patient also has history L TKA 4 years ago. SUBJECTIVE:  Patient notes she is using the cane less and less and only using more so when leaving the house. Patient notes soreness and a good challenge after last visit. The next morning patient had some stiffness but denies any increase in pain.  Good compliance with home program.     TREATMENT   Precautions:    Pain: R medial knee /10 with exercises today     X in shaded column indicates activity completed today   Modalities Parameters/  Location  Notes                     Manual Therapy Time/Technique  Notes                     Exercise/Intervention   Notes   Quad set 10 rep 5 sec     Heelslides 10 reps      SLR 10 reps      Supine Hip Abduction 10 R      Seated LAQ 10 R 5 sec     Seated HS curl 10 R    Green theraband issued           NuStep 5 minutes S9, Level4 x    Standing HR/TR 20 ea way   On foam   Standing march 20B   On foam   Standing HS curls 20B   On foam   Standing step stretches, knee flexion and hamstring  3B 20 sec     Standing Balance activities on foam 3x ea way 20 sec  NBOS, tandem   TKE 20R 5\" x Cable column 20lbs   Reverse TKE       Step Ups, fwd/lat 15B 6-inch step x Single UE support   Standing 3-way hip 20B  x B UE support, on foam    Dynamic Gait- sidestepping, HS curls, marching, tandem 2 laps    Sidestepping in modified squat position- cues for form   NK Table 15R each way 2.5lb ext/5lb flex x    Hydro stick; forward and backward, cues for quad control  15 each way  x    Cable walk: forward, retro, sideways  3 laps each 20 # X    Total Gym  2 x 15   X Highest level                                  Specific Interventions Next Treatment: Progress standing strengthening exercises. Standing foam balance activities. Activity/Treatment Tolerance:  [x]  Patient tolerated treatment well  []  Patient limited by fatigue  []  Patient limited by pain   []  Patient limited by medical complications  []  Other:     Assessment: Patient is making good progress towards all goals at this time and is gaining confidence prior to her surgery on 12/29/2020. Patient is challenged with added progressions this date noting increased fatigue globally. Patient denies any increase in pain at conclusion of treatment session but does demonstrate greatest challenge with CKC strengthening and resisted gait. Short Term Goals:  Time Frame: 3 weeks    1.  Patient will report reduction of R knee pain to less than 2/10 with ability to

## 2020-12-14 ENCOUNTER — OFFICE VISIT (OUTPATIENT)
Dept: FAMILY MEDICINE CLINIC | Age: 75
End: 2020-12-14
Payer: MEDICARE

## 2020-12-14 VITALS
DIASTOLIC BLOOD PRESSURE: 70 MMHG | WEIGHT: 263.1 LBS | RESPIRATION RATE: 16 BRPM | TEMPERATURE: 97.2 F | SYSTOLIC BLOOD PRESSURE: 128 MMHG | BODY MASS INDEX: 40.6 KG/M2 | HEART RATE: 68 BPM

## 2020-12-14 PROCEDURE — 99214 OFFICE O/P EST MOD 30 MIN: CPT | Performed by: FAMILY MEDICINE

## 2020-12-14 RX ORDER — BETAMETHASONE DIPROPIONATE 0.5 MG/G
CREAM TOPICAL
Qty: 50 G | Refills: 2 | Status: SHIPPED | OUTPATIENT
Start: 2020-12-14 | End: 2021-08-19 | Stop reason: SDUPTHER

## 2020-12-14 SDOH — ECONOMIC STABILITY: FOOD INSECURITY: WITHIN THE PAST 12 MONTHS, YOU WORRIED THAT YOUR FOOD WOULD RUN OUT BEFORE YOU GOT MONEY TO BUY MORE.: NEVER TRUE

## 2020-12-14 SDOH — ECONOMIC STABILITY: FOOD INSECURITY: WITHIN THE PAST 12 MONTHS, THE FOOD YOU BOUGHT JUST DIDN'T LAST AND YOU DIDN'T HAVE MONEY TO GET MORE.: NEVER TRUE

## 2020-12-14 SDOH — ECONOMIC STABILITY: INCOME INSECURITY: HOW HARD IS IT FOR YOU TO PAY FOR THE VERY BASICS LIKE FOOD, HOUSING, MEDICAL CARE, AND HEATING?: NOT HARD AT ALL

## 2020-12-14 SDOH — ECONOMIC STABILITY: TRANSPORTATION INSECURITY
IN THE PAST 12 MONTHS, HAS LACK OF TRANSPORTATION KEPT YOU FROM MEETINGS, WORK, OR FROM GETTING THINGS NEEDED FOR DAILY LIVING?: NO

## 2020-12-14 SDOH — ECONOMIC STABILITY: TRANSPORTATION INSECURITY
IN THE PAST 12 MONTHS, HAS THE LACK OF TRANSPORTATION KEPT YOU FROM MEDICAL APPOINTMENTS OR FROM GETTING MEDICATIONS?: NO

## 2020-12-14 ASSESSMENT — ENCOUNTER SYMPTOMS
RESPIRATORY NEGATIVE: 1
GASTROINTESTINAL NEGATIVE: 1

## 2020-12-14 NOTE — PROGRESS NOTES
Chronic Disease Visit Information    BP Readings from Last 3 Encounters:   12/14/20 128/70   11/20/20 116/68   09/29/20 126/68          Hemoglobin A1C (%)   Date Value   08/15/2020 5.7   05/16/2019 5.3     LDL Calculated (mg/dL)   Date Value   08/15/2020 131     HDL (mg/dL)   Date Value   08/15/2020 56     BUN (mg/dL)   Date Value   05/16/2019 13     CREATININE (mg/dL)   Date Value   05/16/2019 0.6     Glucose (mg/dL)   Date Value   05/16/2019 104            Have you changed or started any medications since your last visit including any over-the-counter medicines, vitamins, or herbal medicines? no   Are you having any side effects from any of your medications? -  no  Have you stopped taking any of your medications? Is so, why? -  no    Have you seen any other physician or provider since your last visit? Yes - Records Obtained  Have you had any other diagnostic tests since your last visit? Yes - Records Obtained  Have you been seen in the emergency room and/or had an admission to a hospital since we last saw you? No  Have you had your annual diabetic retinal (eye) exam? No  Have you had your routine dental cleaning in the past 6 months? yes - 10/2020    Have you activated your [employer] account? If not, what are your barriers?  Yes     Patient Care Team:  Garett Dukes DO as PCP - General (Family Medicine)  [facility]         Medical History Review  Past Medical, Family, and Social History reviewed and does contribute to the patient presenting condition    Health Maintenance   Topic Date Due    Hepatitis C screen  1945    Shingles Vaccine (2 of 3) 01/08/2016    Annual Wellness Visit (AWV)  05/29/2019    Flu vaccine (1) 09/01/2020    A1C test (Diabetic or Prediabetic)  08/15/2021    TSH testing  08/15/2021    Breast cancer screen  11/27/2021    Lipid screen  08/15/2025    Colon cancer screen colonoscopy  01/08/2028  DTaP/Tdap/Td vaccine (2 - Td) 02/25/2030    Pneumococcal 65+ years Vaccine  Completed    DEXA (modify frequency per FRAX score)  Addressed    Hepatitis A vaccine  Aged Out    Hepatitis B vaccine  Aged Out    Hib vaccine  Aged Out    Meningococcal (ACWY) vaccine  Aged Out

## 2020-12-14 NOTE — PROGRESS NOTES
Subjective:      Patient ID: Portillo Lovelace is a 77 y.o. female. HPI:    Chief Complaint   Patient presents with    Pre-op Exam     right knee replacement on 12/29/20 with Dr. Babatunde Carbajal Results     stress test this week with Dr. Claudene Chasten     Pre-op eval.  Scheduled for right total knee replacement with Dr. Feli Wesley. Pt denies CP, chest tightness, SOB. She is scheduled for stress and echo with Dr. Claudene Chasten on the 16th, Cardio clearance pending. BP Readings from Last 3 Encounters:   12/14/20 128/70   11/20/20 116/68   09/29/20 126/68     Weights are stable. Wt Readings from Last 3 Encounters:   12/14/20 263 lb 1.6 oz (119.3 kg)   11/20/20 262 lb 9.6 oz (119.1 kg)   09/29/20 265 lb (120.2 kg)     Pt denies general anesthesia complications but pt believes that she may do it under spinal anesthesia only. Patient Active Problem List   Diagnosis    Hypothyroid    Environmental allergies    Hyperlipidemia with target LDL less than 100    Post menopausal problems    Claustrophobia    Difficulty waking    Morbid obesity with BMI of 40.0-44.9, adult ([address]    ARTIS on CPAP    Insomnia     Past Surgical History:   Procedure Laterality Date    COLONOSCOPY  2007    [facility]    EYE SURGERY Right 02/28/2019    Cataract Surgery with Dr. Breanna Morse Left 03/13/2019    Cataract Surgery with Dr. Gretel Melgar Left 2016    Dr Ruchi Spear Left 1/8/2018    COLONOSCOPY SCREENING performed by Roger Patel DO at [facility]     Prior to Admission medications    Medication Sig Start Date End Date Taking? Authorizing Provider   montelukast (SINGULAIR) 10 MG tablet Take 1 tablet by mouth nightly 7/27/20  Yes Jame Navarro DO   levothyroxine (SYNTHROID) 150 MCG tablet TAKE 1 TABLET DAILY 5/27/20  Yes Jame Navarro DO   temazepam (RESTORIL) 15 MG capsule Take 15 mg by mouth nightly as needed for Sleep.    Yes Historical Provider, MD levocetirizine (XYZAL) 5 MG tablet Take 5 mg by mouth nightly   Yes Historical Provider, MD   Lactobacillus (ACIDOPHILUS) 100 MG CAPS Take by mouth   Yes Historical Provider, MD   celecoxib (CELEBREX) 100 MG capsule Take 1 capsule by mouth 2 times daily 8/14/19  Yes Jnais Carter DO   OLIVE LEAF EXTRACT PO Take 2 capsules by mouth daily   Yes Historical Provider, MD   triamcinolone (NASACORT ALLERGY 24HR) 55 MCG/ACT nasal inhaler 2 sprays by Nasal route daily as needed   Yes Historical Provider, MD   vitamin B-12 (CYANOCOBALAMIN) 500 MCG tablet Take 500 mcg by mouth daily Indications: 2500mg a day   Yes Historical Provider, MD   Niacin (VITAMIN B-3 PO) Take by mouth daily    Yes Historical Provider, MD   Magnesium 500 MG CAPS Take by mouth daily    Yes Historical Provider, MD   vitamin E 400 UNIT capsule Take 400 Units by mouth daily   Yes Historical Provider, MD   Cholecalciferol (VITAMIN D3) 63086 UNITS CAPS Take 10,000 Units by mouth daily. Yes Historical Provider, MD   therapeutic multivitamin-minerals (THERAGRAN-M) tablet Take 1 tablet by mouth daily. Yes Historical Provider, MD   fluocinolone (DERMA-SMOOTHE/FS BODY) 0.01 % OIL external oil Apply 5 gtts BID both ears  Patient not taking: Reported on 12/14/2020 5/20/19   Janis Carter DO       Review of Systems   Constitutional: Negative. HENT: Negative. Respiratory: Negative. Cardiovascular: Negative. Gastrointestinal: Negative. Musculoskeletal: Negative. All other systems reviewed and are negative. Objective:   Physical Exam  Vitals signs and nursing note reviewed. Constitutional:       General: She is not in acute distress. Appearance: Normal appearance. She is well-developed. HENT:      Head: Normocephalic and atraumatic. Right Ear: Tympanic membrane normal.      Left Ear: Tympanic membrane normal.   Eyes:      Conjunctiva/sclera: Conjunctivae normal.   Neck:      Musculoskeletal: Neck supple. Cardiovascular:      Rate and Rhythm: Normal rate and regular rhythm. Heart sounds: Normal heart sounds. No murmur. Pulmonary:      Effort: Pulmonary effort is normal.      Breath sounds: Normal breath sounds. No wheezing, rhonchi or rales. Abdominal:      General: There is no distension. Skin:     General: Skin is warm and dry. Findings: No rash (on exposed surfaces). Neurological:      General: No focal deficit present. Mental Status: She is alert. Psychiatric:         Attention and Perception: Attention normal.         Mood and Affect: Mood normal.         Speech: Speech normal.         Behavior: Behavior normal. Behavior is cooperative. Thought Content: Thought content normal.         Judgment: Judgment normal.         Assessment:       Diagnosis Orders   1. Pre-op evaluation     2. Osteoarthritis of right knee, unspecified osteoarthritis type     3. ARTIS on CPAP     4. Morbid obesity with BMI of 40.0-44.9, adult ([address]     5. Hyperlipidemia with target LDL less than 100     6. Hypothyroidism, unspecified type             Plan:      -  Labs, EKG reviewed  -  Pt is scheduled for stress and echo on the 16th, clearance pending results  -  RTO prn for now      12/17/20 Addendum:  Stress and echo wnl.   Pt acceptable risk for surgery, copy of this note will be sent to Dr. Jose Rodríguez DO

## 2020-12-16 ENCOUNTER — HOSPITAL ENCOUNTER (OUTPATIENT)
Dept: NON INVASIVE DIAGNOSTICS | Age: 75
Discharge: HOME | End: 2020-12-16
Payer: MEDICARE

## 2020-12-16 ENCOUNTER — APPOINTMENT (OUTPATIENT)
Dept: PHYSICAL THERAPY | Age: 75
End: 2020-12-16
Payer: MEDICARE

## 2020-12-16 VITALS — WEIGHT: 260 LBS | HEIGHT: 68 IN | BODY MASS INDEX: 39.4 KG/M2

## 2020-12-16 LAB
LV EF: 55 %
LVEF MODALITY: NORMAL

## 2020-12-16 PROCEDURE — 78452 HT MUSCLE IMAGE SPECT MULT: CPT

## 2020-12-16 PROCEDURE — 3430000000 HC RX DIAGNOSTIC RADIOPHARMACEUTICAL: Performed by: NUCLEAR MEDICINE

## 2020-12-16 PROCEDURE — 93306 TTE W/DOPPLER COMPLETE: CPT

## 2020-12-16 PROCEDURE — 93017 CV STRESS TEST TRACING ONLY: CPT | Performed by: NUCLEAR MEDICINE

## 2020-12-16 PROCEDURE — A9500 TC99M SESTAMIBI: HCPCS | Performed by: NUCLEAR MEDICINE

## 2020-12-16 PROCEDURE — 6360000002 HC RX W HCPCS

## 2020-12-16 RX ADMIN — Medication 29.4 MILLICURIE: at 12:15

## 2020-12-16 RX ADMIN — Medication 8.3 MILLICURIE: at 10:55

## 2020-12-18 ENCOUNTER — HOSPITAL ENCOUNTER (OUTPATIENT)
Dept: PHYSICAL THERAPY | Age: 75
Setting detail: THERAPIES SERIES
Discharge: HOME | End: 2020-12-18
Payer: MEDICARE

## 2020-12-18 PROCEDURE — 97110 THERAPEUTIC EXERCISES: CPT

## 2020-12-18 NOTE — PROGRESS NOTES
[facility]  PHYSICAL THERAPY  [] EVALUATION  [x] DAILY NOTE (LAND) [] DAILY NOTE (AQUATIC ) [] PROGRESS NOTE [] DISCHARGE NOTE    [x] OUTPATIENT REHABILITATION CENTER [facility]   [] [place] [address]    [] [facility]   [] [facility]    Date: 2020  Patient Name:  Dwight Shepard  : 1945  MRN: [id number]  CSN: [id number]    Referring Practitioner Leeann Moritz, MD   Diagnosis Unilateral primary osteoarthritis, right knee [M17.11]    Treatment Diagnosis R knee pain, R knee stiffness, Difficulty with ambulation, Unsteadiness, Muscle weakness   Date of Evaluation 20    Additional Pertinent History L TKA 4 years ago, hypothyroid      Functional Outcome Measure Used LEFS   Functional Outcome Score  (20)       Insurance: Primary: Payor: Amando Valentine /  /  / ,   Secondary:    Authorization Information: Unlimited visits per medical necessity   Visit # 7, 7/10 for progress note   Visits Allowed: Unlimited   Recertification Date:    Physician Follow-Up: TKA scheduled for 2020   Physician Orders: Strengthening prior to surgery   History of Present Illness: Patient underwent R knee scope on 2020 to clean up the medial meniscus. Patient reports she is bone on bone and will be having a TKA on 2020. Knee arthroscopy was unsuccessful due to extent of OA. Patient was instructed to complete therapy prior to TKA to strengthen the knee. Patient also has history L TKA 4 years ago. SUBJECTIVE:  Patient notes increased knee soreness today noting \"I am really working this knee and trying to get it stronger before surgery. \" Patient using SPC when walking in the community. Patient is increasing her reps as well.      TREATMENT   Precautions:    Pain: R medial knee 4/10 with exercises today     X in shaded column indicates activity completed today   Modalities Parameters/  Location  Notes                     Manual Therapy Time/Technique  Notes                     Exercise/Intervention   Notes   Quad set 10 rep 5 sec     Heelslides 10 reps      SLR 10 reps      Supine Hip Abduction 10 R      Seated LAQ 10 R 5 sec     Seated HS curl 10 R    Green theraband issued           NuStep 6 minutes S9, Level5 x    Standing HR/TR 20 ea way   On foam   Standing march 20B   On foam   Standing HS curls 20B   On foam   Standing step stretches, knee flexion and hamstring  3B 20 sec X    Standing Balance activities on foam 3x ea way 20 sec  NBOS, tandem   TKE 20R 5\" x Cable column 20lbs   Reverse TKE       Step Ups, fwd/lat 15B 6-inch step x Single UE support; forward only pain with lateral this date   Standing 3-way hip 20B   B UE support, on foam    Dynamic Gait- sidestepping, HS curls, marching, tandem 2 laps    Sidestepping in modified squat position- cues for form   NK Table 20R each way 2.5lb ext/5lb flex x    Hydro stick; forward and backward, cues for quad control  15 each way      Cable walk: forward, retro, sideways  4 laps each 20 # X    Total Gym  2 x 15   X Highest level: squats and heel raises- 2 sets each                                 Specific Interventions Next Treatment: Progress standing strengthening exercises. Standing foam balance activities. Activity/Treatment Tolerance:  [x]  Patient tolerated treatment well  []  Patient limited by fatigue  []  Patient limited by pain   []  Patient limited by medical complications  []  Other:     Assessment: Patient is very motivated to progress program and complete HEP however patient may be over doing it as gait quality is affected with decreased stance time, knee extension at heel strike, and increased reliance of SPC for support and also presents today with increased R knee pain. Patient is able to tolerate today's treatment session and increased reps today to challenge muscle endurance and is most challenged with CKC strengthening.       Short Term Goals:  Time Frame: 3 weeks    1. Patient will report reduction of R knee pain to less than 2/10 with ability to ascend/descend stairs and perform sit<>stand transfers. 2.  Patient will demonstrate increased R knee AROM to 0-120 degrees to improve car transfer and normalize gait pattern. Long Term Goals:  Time Frame: 6 weeks    1. Patient will demonstrate increased LE strength to greater than or equal to 4+/5 to improve overall standing tolerance and ability to stand to perform ADLs at the sink. 2.  Patient will demonstrate improved function on LEFS with score of >50/80. 3.  Patient will be independent with comprehensive HEP. 4.  Patient will ambulate without antalgia, unlimited community distances without need for AD demonstrating normalized knee mechanics. Patient Education:   [x]  HEP/Education Completed: Perform HEP within tolerance and not over fatigue to the point where walking quality is impacted   [facility] Access Code: [email or web address]    []  No new Education completed  [x]  Reviewed Prior HEP      [x]  Patient verbalized and/or demonstrated understanding of education provided. []  Patient unable to verbalize and/or demonstrate understanding of education provided. Will continue education. []  Barriers to learning:     PLAN:  Treatment Recommendations: Strengthening, Range of Motion, Balance Training, Functional Mobility Training, Transfer Training, Gait Training, Stair Training, Neuromuscular Re-education, Manual Therapy - Soft Tissue Mobilization, Pain Management, Home Exercise Program, Patient Education, Safety Education and Training, Integrative Dry Needling and Modalities    []  Plan of care initiated. Plan to see patient 2 times per week for 6 weeks to address the treatment planned outlined above.   [x]  Continue with current plan of care  []  Modify plan of care as follows:    []  Hold pending physician visit  []  Discharge    Time In 1000   Time Out 1045   Timed Code Minutes: 45 min   Total Treatment Time: 45 min       Electronically Signed by: Bhupinder Amador

## 2020-12-23 ENCOUNTER — HOSPITAL ENCOUNTER (OUTPATIENT)
Dept: PHYSICAL THERAPY | Age: 75
Setting detail: THERAPIES SERIES
Discharge: HOME | End: 2020-12-23
Payer: MEDICARE

## 2020-12-23 PROCEDURE — 97110 THERAPEUTIC EXERCISES: CPT

## 2020-12-23 NOTE — FLOWSHEET NOTE
** PLEASE SIGN, DATE AND TIME CERTIFICATION BELOW AND RETURN TO [facility] OUTPATIENT REHABILITATION (FAX #: [phone]). ATTEST/CO-SIGN IF ACCESSING VIA INMapbox. THANK YOU.**    I certify that I have examined the patient below and determined that Physical Medicine and Rehabilitation service is necessary and that I approve the established plan of care for up to 90 days or as specifically noted. Attestation, signature or co-signature of physician indicates approval of certification requirements.    ________________________ ____________ __________  Physician Signature   Date   Time    [address] [facility]  PHYSICAL THERAPY  [] EVALUATION  [] DAILY NOTE (LAND) [] DAILY NOTE (AQUATIC ) [x] PROGRESS NOTE [] DISCHARGE NOTE    [x] [facility]   [] [place] [address]    [] [facility]   [] [facility]    Date: 2020  Patient Name:  Portillo Lovelace  : 1945  MRN: [id number]  CSN: [id number]    Referring Practitioner Dimple Haley MD   Diagnosis Unilateral primary osteoarthritis, right knee [M17.11]    Treatment Diagnosis R knee pain, R knee stiffness, Difficulty with ambulation, Unsteadiness, Muscle weakness   Date of Evaluation 20    Additional Pertinent History L TKA 4 years ago, hypothyroid      Functional Outcome Measure Used LEFS   Functional Outcome Score 48/80 (20); 19/80 (20)       Insurance: Primary: Payor: Crow Ponce /  /  / ,   Secondary:    Authorization Information: Unlimited visits per medical necessity   Visit # 8, 1/10 for progress note   Visits Allowed: Unlimited   Recertification Date:    Physician Follow-Up: TKA scheduled for 2020   Physician Orders: Strengthening prior to surgery   History of Present Illness: Patient underwent R knee scope on 2020 to clean up the medial meniscus. Patient reports she is bone on bone and will be having a TKA on 2020.  Knee arthroscopy was unsuccessful due to extent of OA. Patient was instructed to complete therapy prior to TKA to strengthen the knee. Patient also has history L TKA 4 years ago. SUBJECTIVE:  Patient reports overall improvement of 70% at this time with patient experiencing significant pain reduction, now using ibuprofen only intermittently compared to around the clock prior to PT. Patient notes improved sleep and is almost able to perform sit<>stand without UE support now. Highest pain rating in R knee is 4/10, but average is 2/10. Patient does experience soreness following PT sessions that resolves with rest. Patient will be undergoing R TKA on 12/29/20 by Dr. Sarkis Mckay. Patient is tentatively scheduled for PT re-eval one week from today.      TREATMENT   Precautions:    Pain: R medial knee 4/10 with exercises today     X in shaded column indicates activity completed today   Modalities Parameters/  Location  Notes                     Manual Therapy Time/Technique  Notes                     Exercise/Intervention   Notes   Quad set 10 rep 5 sec     Heelslides 10 reps      SLR 10 reps      Supine Hip Abduction 10 R      Seated LAQ 10 R 5 sec     Seated HS curl 10 R    Green theraband issued           NuStep 6 minutes S9, Level5 x    Standing HR/TR 20 ea way   On foam   Standing march 20B   On foam   Standing HS curls 20B   On foam   Standing step stretches, knee flexion and hamstring  3B 20 sec     Standing Balance activities on foam 3x ea way 20 sec  NBOS, tandem   TKE 20R 5\" x Cable column 20lbs   Reverse TKE       Step Ups, fwd/lat 15B 6-inch step x Single UE support; forward only pain with lateral this date   Standing 3-way hip 20B  x B UE support, on foam    Dynamic Gait- sidestepping, HS curls, marching, tandem 2 laps   x Sidestepping in modified squat position- cues for form   NK Table 20R each way 2.5lb ext/5lb flex x    Hydro stick; forward and backward, cues for quad control  15 each way  x    Cable walk: forward, retro, sideways  4 laps each 20 # X    Total Gym  2 x 15   X Highest level: squats and heel raises- 2 sets each                                 Specific Interventions Next Treatment: Progress standing strengthening exercises. Standing foam balance activities. Activity/Treatment Tolerance:  [x]  Patient tolerated treatment well  []  Patient limited by fatigue  []  Patient limited by pain   []  Patient limited by medical complications  []  Other:     Assessment: Since starting PT, patient has made good progress and at this time is experiencing reduced pain in R knee, and can stand and walk longer durations compared to evaluation. Patient additionally demonstrates improved function on LEFS with score of 48/80 compared to 19/80 at evaluation. Patient demonstrates increased LE strength as well. Refer to goal status below. Patient has done very well with pre-op rehab and demonstrates readiness for surgery next week with therapy to follow. Short Term Goals:  Time Frame: 3 weeks    1. Patient will report reduction of R knee pain to less than 2/10 with ability to ascend/descend stairs and perform sit<>stand transfers. GOAL PARTIALLY MET; continue goal. Patient reports pain in R knee at worst is 4/10, average is 2/10 but stairs remain challenging. 2.  Patient will demonstrate increased R knee AROM to 0-120 degrees to improve car transfer and normalize gait pattern. GOAL NOT MET. Continue goal. Patient demonstrates 3-116 degrees today. Long Term Goals:  Time Frame: 6 weeks  1. Patient will demonstrate increased LE strength to greater than or equal to 4+/5 to improve overall standing tolerance and ability to stand to perform ADLs at the sink. GOAL MET. Continue goal after surgery. Patient demonstrates 4+/5 global RLE strength today. 2.  Patient will demonstrate improved function on LEFS with score of >50/80.  GOAL NOT MET; continue goal. 48/80 today compared to 19/80 at eval.     3.  Patient will be independent with comprehensive HEP. GOAL MET: continue goal after surgery. Patient demonstrates ongoing understanding of HEP and is compliant daily. 4.  Patient will ambulate without antalgia, unlimited community distances without need for AD demonstrating normalized knee mechanics. GOAL NOT MET; continue goal. Patient demonstrates ongoing antalgia although community distance ambulation is now well tolerated. Patient Education:   [x]  HEP/Education Completed: Perform HEP within tolerance and not over fatigue to the point where walking quality is impacted   [employer] Access Code: [email or web address]    []  No new Education completed  [x]  Reviewed Prior HEP      [x]  Patient verbalized and/or demonstrated understanding of education provided. []  Patient unable to verbalize and/or demonstrate understanding of education provided. Will continue education. []  Barriers to learning:     PLAN:  Treatment Recommendations: Strengthening, Range of Motion, Balance Training, Functional Mobility Training, Transfer Training, Gait Training, Stair Training, Neuromuscular Re-education, Manual Therapy - Soft Tissue Mobilization, Pain Management, Home Exercise Program, Patient Education, Safety Education and Training, Integrative Dry Needling and Modalities    []  Plan of care initiated. Plan to see patient 2 times per week for 6 weeks to address the treatment planned outlined above. []  Continue with current plan of care  [x]  Modify plan of care as follows: Patient undergoing R TKA on 12/29/2020 and will be re-evaluated on 12/31/2021 to resume care post-operatively.   []  Hold pending physician visit  []  Discharge    Time In 1300   Time Out 1345   Timed Code Minutes: 45 min   Total Treatment Time: 45 min       Electronically Signed by: Riddhi Stoner PT, DPT [id number]

## 2020-12-31 ENCOUNTER — HOSPITAL ENCOUNTER (OUTPATIENT)
Dept: PHYSICAL THERAPY | Age: 75
Setting detail: THERAPIES SERIES
Discharge: HOME | End: 2020-12-31
Payer: MEDICARE

## 2020-12-31 PROCEDURE — 97110 THERAPEUTIC EXERCISES: CPT

## 2020-12-31 PROCEDURE — 97164 PT RE-EVAL EST PLAN CARE: CPT

## 2020-12-31 NOTE — FLOWSHEET NOTE
** PLEASE SIGN, DATE AND TIME CERTIFICATION BELOW AND RETURN TO [facility] OUTPATIENT REHABILITATION (FAX #: [phone]). ATTEST/CO-SIGN IF ACCESSING VIA [employer]. THANK YOU.**    I certify that I have examined the patient below and determined that Physical Medicine and Rehabilitation service is necessary and that I approve the established plan of care for up to 90 days or as specifically noted. Attestation, signature or co-signature of physician indicates approval of certification requirements.    ________________________ ____________ __________  Physician Signature   Date   Time  [address] [facility]  PHYSICAL THERAPY  [x] RE- EVALUATION  [] DAILY NOTE (LAND) [] DAILY NOTE (AQUATIC ) [] PROGRESS NOTE [] DISCHARGE NOTE    [x] [address] [facility]   [] [facility] 90    [] [facility]   [] Nash Cheadle    Date: 2020  Patient Name:  Brandon Aguero  : 1945  MRN: [id number]  CSN: [id number]    Referring Practitioner Carlos Pan MD   Diagnosis Unilateral primary osteoarthritis, right knee [M17.11]    Treatment Diagnosis R knee pain, R knee stiffness, Difficulty with ambulation, Unsteadiness, Muscle weakness   Date of Evaluation RE-EVAL 2020; EVAL 20    Additional Pertinent History R TKA 2020, L TKA 4 years ago, hypothyroid      Functional Outcome Measure Used LEFS   Functional Outcome Score 25/80 (2020); 19/80 (20)       Insurance: Primary: Payor: [place] /  /  / ,   Secondary:    Authorization Information: Unlimited visits per medical necessity   Visit # 9, 1/10 for progress note   Visits Allowed: Unlimited   Recertification Date:    Physician Follow-Up: 2021   Physician Orders:    History of Present Illness: Patient presents for re-evaluation following R TKA performed 2020 by Dr. Landen Jackson. Patient is now using rolling walker and reports she is doing very well.  She had a one night stay at the hospital and was discharged home yesterday morning. Patient is taking 1000mg Tylenol and Tramadol. Patient had a spinal block and received a pain medication injection prior to closure. \"I feel very positive about everything. \" Patient reports sleeping really well in bed last night. Patient is using an ice wrap issued by surgeon. Patient is wearing B ASHLEY hose for 2 weeks. Patient underwent R knee scope on 7/8/2020 to clean up the medial meniscus. Patient reports she is bone on bone. Knee arthroscopy was unsuccessful due to extent of OA. Patient completed therapy prior to TKA to strengthen the knee. Patient also has history L TKA 4 years ago. SUBJECTIVE: Patient presents 2 days status post R TKA performed 12/29/2020 by Dr. Jose L Posey. Patient rates R knee pain 5/10 but has not yet had to use oxycodone and is sticking to Tylenol and Tramadol. Social/Functional History and Current Status:  Medications and Allergies have been reviewed and are listed on Medical History Questionnaire. Brenda Briggs lives with significant other in a single story home with stairs and a handrail to enter. 1 step from garage. Task Previous Current   ADLs  Independent Assistance Required; Donning socks and shoes on the R is difficult and required spouse assistance. Patient has to perform ADLs in 10 minute increments for things such as makeup and hair. IADL's Independent Assistance Required; patient relying on spouse for homemaking tasks at this time. Ambulation Independent; patient was previously able to walk grocery store easily. Patient was cautious on uneven surfaces. Modified Independent; patient now using walker for all ambulation and distance is severely limited; patient was taxed with walking to [facility] room. Transfers Independent; Patient was having difficulty standing from a chair since L knee replacement years ago.   Modified Independent; Patient now having trouble from low couch with sit<>stand and notes trouble getting in and out of the car. Recreation Independent; Patient was previously going to the [facility] multiple times per week for exercise classes. Patient no longer able to go to exercise classes. Community Integration Independent Modified Independent   Driving Active  Does not drive since surgery until clearance at follow up appointment. Work Retired  Retired     OBJECTIVE:    Pain:  R knee along distal quad and medial joint line 5/10. Dull achy pain at this time. Palpation TTP along R medial knee joint line and into medial quadricep. No hamstring or popliteal tenderness. Observation Incision is covered with post-operative dressing and not to be removed until next week per patient. Visual inspection revealed no s/s of infection, only mild redness along medial knee as expected. Mild edema present compared to L knee. Sensation Intact light touch but patient does have numbness along borders of incision. Bed Mobility Patient able to perform supine<>sit with supervision. Transfers Patient relies on UE support and cues for LE alignment. Ambulation Patient ambulates with decreased gait speed, antalgia is evident and patient is showing decreased weight shift onto RLE. Patient is using a walker at this time. Patient with decreased knee flexion during swing phase of gait and decreased heelstrike on RLE. Stairs Non reciprocal pattern with BUE support. Patient has only attempted 2 steps leading with LLE though. Balance Unsteadiness noted with patient using straight cane, relies on UE support for standing exercises. Special Tests Held special tests due to surgery.            LOWER EXTREMITY RANGE OF MOTION    Left Right COMMENTS   Hip Flexion      Hip Extension      Hip ABDuction      Hip ADDuction      Hip Internal Rotation      Hip External Rotation      Hip Range of Motion is [facility]  [x]      Knee Flexion 90 deg 93 deg heelslide   Knee Extension 0 deg Lacking 5 deg  Heel prop measurement   Knee Range of Motion is [facility]  []          LE STRENGTH R L   Hip Flexion 4-/5 4+/5   Hip Abduction 4-/5 4/5   Hip Adduction 4/5 4/5   Knee Flexion 4-/5 4/5   Knee Extension 4-/5 4+/5   Ankle DF 4+/5 4+/5         TREATMENT   Precautions:    Pain: R medial knee 5/10 with exercises today     X in shaded column indicates activity completed today   Modalities Parameters/  Location  Notes                     Manual Therapy Time/Technique  Notes                     Exercise/Intervention   Notes   Quad set 10 rep 5 sec x    Heelslides 10 reps  x    Heelslides with strap       SAQ 10R 5 sec X    SLR 10 reps  x Cues to maintain quad set   Supine Hip Abduction 10 R  x Cues to avoid hip ER          Seated march 10B  X    Seated heelslide 10R 5 sec X    Seated LAQ 10R 5 sec x    Seated HS curl    Green theraband issued previously          NuStep    Attempted to initiate, patient declined due to pain and not wanting to over do it. Standing march 10B  x    Standing HS curls       Standing knee flexion stretch on step       Standing Balance activities on foam       TKE       Reverse TKE       Step Ups, fwd/lat       Standing 3-way hip              Standing gastroc, hamstring, knee flexion stretches 3R 15\" X Gastroc and hamstring only today   Gait Training  5 minutes X Using rolling walker with cues for R knee flexion during swing phase and heelstrike on initial contact. Specific Interventions Next Treatment: Therapist to change dressing next week (patient to bring new dressing). Initiate standing exercises in parallel bars to promote increased R knee AROM and balance reactions. Sit<>stand transfer training and stair climbing review for safefty after surgery. HEP instruction.     Activity/Treatment Tolerance:  [x]  Patient tolerated treatment well  []  Patient limited by fatigue  []  Patient limited by pain   []  Patient limited by medical complications  []  Other:     Assessment: Patient is 2 days status post R TKE performed 12/29/2020 by Dr. Josselin Rangel. Patient functionally is moving very well given recent surgery, walking with rolling walker with minimal UE reliance on it. Patient does need UE support to perform sit<>stand transfer at this time and cues were provided for LE alignment to promote functional knee bend. Patient demonstrates 5-93 degree R knee AROM today in supine. Patient continues to have pain as expected post-operatively and is managing it with medication and ice application. Patient is in need of skilled PT to address pain in R knee, to restore full AROM for ease of transfers and stair climbing, and to strengthen the RLE globally to improve all functional mobility skills. Patient was very receptive to all education provided today including gait training to improve both pattern and weight shifting. Body Structures/Functions/Activity Limitations: impaired activity tolerance, impaired balance, impaired endurance, impaired ROM, impaired safety awareness, impaired strength, pain and abnormal gait  Prognosis: good    Short Term Goals:  Time Frame: 6 weeks    1. Patient will report reduction of R knee pain to less than 2/10 with ability to ascend/descend stairs reciprocally and perform sit<>stand transfers. 2.  Patient will demonstrate increased R knee AROM to 0-120 degrees to improve car transfer and normalize gait pattern. 3.  Patient will demonstrate complete resolution of R knee edema. Long Term Goals:  Time Frame: 12 weeks    1. Patient will demonstrate increased LE strength to greater than or equal to 4+/5 to improve overall standing tolerance and ability to stand to perform ADLs at the sink. 2.  Patient will demonstrate improved function on LEFS with score of >65/80. 3.  Patient will be independent with comprehensive HEP. 4.  Patient will ambulate without antalgia, unlimited community distances without need for AD demonstrating normalized knee mechanics.          Patient Education:   [x]  HEP/Education Completed: Plan of Care, Goals, Sit<>stand transfer training, importance of HEP compliance. [employer] Access Code: [email or web address]     []  No new Education completed  []  Reviewed Prior HEP      [x]  Patient verbalized and/or demonstrated understanding of education provided. []  Patient unable to verbalize and/or demonstrate understanding of education provided. Will continue education. []  Barriers to learning:     PLAN:  Treatment Recommendations: Strengthening, Range of Motion, Balance Training, Functional Mobility Training, Transfer Training, Gait Training, Stair Training, Neuromuscular Re-education, Manual Therapy - Soft Tissue Mobilization, Pain Management, Home Exercise Program, Patient Education, Safety Education and Training, Integrative Dry Needling and Modalities    [x]  Plan of care initiated 12/31/2020. Plan to see patient 3 times per week for 12 weeks to address the treatment plan outlined above.   []  Continue with current plan of care  []  Modify plan of care as follows:    []  Hold pending physician visit  []  Discharge    Time In 1300   Time Out 1355   Timed Code Minutes: 30 min   Total Treatment Time: 55 min       Electronically Signed by: David Salinas PT, DPT [id number]

## 2021-01-04 ENCOUNTER — HOSPITAL ENCOUNTER (OUTPATIENT)
Dept: PHYSICAL THERAPY | Age: 76
Setting detail: THERAPIES SERIES
Discharge: HOME | End: 2021-01-04
Payer: MEDICARE

## 2021-01-04 PROCEDURE — 97110 THERAPEUTIC EXERCISES: CPT

## 2021-01-04 NOTE — PROGRESS NOTES
[facility]  PHYSICAL [facility]ERAP[facility]  [] RE- EVALUATION  [x] DAILY NOTE (LAND) [] DAILY NOTE (AQUATIC ) [] PROGRESS NOTE [] DISCHARGE NOTE    [x] OUTPATIENT REHABILITATION [facility]   [] [place] [address]    [] [facility]   [] Jenna Tucker    Date: 2021  Patient Name:  Cody Wright  : 1945  MRN: [id number]  CSN: [id number]    Referring Practitioner Chip Brown MD   Diagnosis Unilateral primary osteoarthritis, right knee [M17.11]    Treatment Diagnosis R knee pain, R knee stiffness, Difficulty with ambulation, Unsteadiness, Muscle weakness   Date of Evaluation RE-EVAL 2020; EVAL 20    Additional Pertinent History R TKA 2020, L TKA 4 years ago, hypothyroid      Functional Outcome Measure Used LEFS   Functional Outcome Score 25/80 (2020); 19/80 (20)       Insurance: Primary: Payor: Jusp Evelin /  /  / ,   Secondary:    Authorization Information: Unlimited visits per medical necessity   Visit # 10, 2/10 for progress note   Visits Allowed: Unlimited   Recertification Date:    Physician Follow-Up: 2021   Physician Orders:    History of Present Illness: Patient presents for re-evaluation following R TKA performed 2020 by Dr. Jamie Griffin. Patient is now using rolling walker and reports she is doing very well. She had a one night stay at the hospital and was discharged home yesterday morning. Patient is taking 1000mg Tylenol and Tramadol. Patient had a spinal block and received a pain medication injection prior to closure. \"I feel very positive about everything. \" Patient reports sleeping really well in bed last night. Patient is using an ice wrap issued by surgeon. Patient is wearing B ASHLEY hose for 2 weeks. Patient underwent R knee scope on 2020 to clean up the medial meniscus. Patient reports she is bone on bone. Knee arthroscopy was unsuccessful due to extent of OA.  Patient completed therapy prior to TKA to strengthen the knee. Patient also has history L TKA 4 years ago. SUBJECTIVE: Patient reports that since the \"pain cocktail he injected in my knee wore off on Thursday evening. \" Patient reports using oxycontin once over the weekend. Patient rated pain today 5/10. Patient continues to wear ASHLEY hose and post-op dressing is intact and free of visible drainage. Patient has been using ice machine multiple times throughout the day. Greatest difficulty now is bending motion such as for activities such as getting in and out of the car. Patient reports hip abduction motion remains very challenging and she is now needing help getting in and out of bed.  Patient reports that bruising has set in and she is now bruised from mid thigh to ankle on R.       TREATMENT   Precautions:    Pain: R medial knee 5/10     X in shaded column indicates activity completed today   Modalities Parameters/  Location  Notes                     Manual Therapy Time/Technique  Notes                     Exercise/Intervention   Notes   Quad set 15 rep 5 sec x    Heelslides 10 reps  x    Heelslides with strap       SAQ 10R 5 sec X Tapping quad to facilitate contraction   SLR 10 reps  x Cues to maintain quad set; therapist min A   Supine Hip Abduction 10 R  x Cues to avoid hip ER; therapist at [facility] march 10B      Seated heelslide 10R 5 sec X    Seated LAQ 10R 5 sec x    Seated HS curl    Green theraband issued previously          NuStep 5 minutes Level 1 X Seat 10, No arms   Standing march 10B  x BUE support    Standing HS curls 10R  X \"  \"   Standing knee flexion stretch on step 1R 20 sec X    Standing Balance activities on foam       TKE       Reverse TKE       Step Ups, fwd/lat       Standing 3-way hip 10B each way  X \"  \"          Standing gastroc, hamstring, knee flexion stretches 3R 20\" X Gastroc and hamstring only today   Gait Training  5 minutes X Using rolling walker with cues for R knee flexion during swing phase and heelstrike on initial contact. Specific Interventions Next Treatment: Therapist to change dressing next week (patient to bring new dressing). Initiate standing exercises in parallel bars to promote increased R knee AROM and balance reactions. Sit<>stand transfer training and stair climbing review for safefty after surgery. HEP instruction. Activity/Treatment Tolerance:  []  Patient tolerated treatment well  []  Patient limited by fatigue  [x]  Patient limited by pain and swelling  []  Patient limited by medical complications  []  Other:     Assessment: Patient reports slight functional decline since evaluation as pain block wore off over the weekend. With increase in pain and swelling, patient has had increased difficulty with car transfer and getting on and off the NuStep today. Therapist had to assist in lifting RLE onto pedal today. Patient was quick to fatigue today with standing program initiated. Global RLE swelling is evident and as a result, patient demonstrates only 72 deg flexion AROM today compared to 93 degrees at re-eval last Thursday. Patient denies presence of calf pain and Homans is negative, no other s/s of DVT or infection. Patient did acknowledge that she has not been pushing herself with HEP at home if the exercises are painful. Patient demonstrates functional backslide in the last few days due to onset of major swelling and increased pain. Visual inspection performed and no drainage present on dressing from external inspection. Patient is in need of ongoing skilled PT to address pain in R knee, swelling that is hindering AROM, and to strengthen RLE globally to resume active lifestyle. Body Structures/Functions/Activity Limitations: impaired activity tolerance, impaired balance, impaired endurance, impaired ROM, impaired safety awareness, impaired strength, pain and abnormal gait  Prognosis: good    Short Term Goals:  Time Frame: 6 weeks    1.  Patient will report reduction of R knee pain to less than 2/10 with ability to ascend/descend stairs reciprocally and perform sit<>stand transfers. 2.  Patient will demonstrate increased R knee AROM to 0-120 degrees to improve car transfer and normalize gait pattern. 3.  Patient will demonstrate complete resolution of R knee edema. Long Term Goals:  Time Frame: 12 weeks    1. Patient will demonstrate increased LE strength to greater than or equal to 4+/5 to improve overall standing tolerance and ability to stand to perform ADLs at the sink. 2.  Patient will demonstrate improved function on LEFS with score of >65/80. 3.  Patient will be independent with comprehensive HEP. 4.  Patient will ambulate without antalgia, unlimited community distances without need for AD demonstrating normalized knee mechanics. Patient Education:   [x]  HEP/Education Completed: Importance of pushing HEP, sit<>stand alignment  [employer] Access Code: [email or web address]     []  No new Education completed  []  Reviewed Prior HEP      [x]  Patient verbalized and/or demonstrated understanding of education provided. []  Patient unable to verbalize and/or demonstrate understanding of education provided. Will continue education. []  Barriers to learning:     PLAN:  Treatment Recommendations: Strengthening, Range of Motion, Balance Training, Functional Mobility Training, Transfer Training, Gait Training, Stair Training, Neuromuscular Re-education, Manual Therapy - Soft Tissue Mobilization, Pain Management, Home Exercise Program, Patient Education, Safety Education and Training, Integrative Dry Needling and Modalities    []  Plan of care initiated 12/31/2020. Plan to see patient 3 times per week for 12 weeks to address the treatment plan outlined above.   [x]  Continue with current plan of care  []  Modify plan of care as follows:    []  Hold pending physician visit  []  Discharge    Time In 1400   Time Out 1444   Timed Code Minutes: 44 min Total Treatment Time: 44 min       Electronically Signed by: Kayla Elias PT, DPT [id number]

## 2021-01-06 ENCOUNTER — HOSPITAL ENCOUNTER (OUTPATIENT)
Dept: PHYSICAL THERAPY | Age: 76
Setting detail: THERAPIES SERIES
Discharge: HOME | End: 2021-01-06
Payer: MEDICARE

## 2021-01-06 PROCEDURE — 97110 THERAPEUTIC EXERCISES: CPT

## 2021-01-06 NOTE — PROGRESS NOTES
[address] [facility]  PHYSICAL THERAPY  [] RE- EVALUATION  [x] DAILY NOTE (LAND) [] DAILY NOTE (AQUATIC ) [] PROGRESS NOTE [] DISCHARGE NOTE    [x] OUTPATIENT REHABILITATION [facility]   [] [place] [address]    [] [facility]   [] Jose Luis Aline    Date: 2021  Patient Name:  Kyra Mckeon  : 1945  MRN: [id number]  CSN: [id number]    Referring Practitioner Alonso Conroy MD   Diagnosis Unilateral primary osteoarthritis, right knee [M17.11]    Treatment Diagnosis R knee pain, R knee stiffness, Difficulty with ambulation, Unsteadiness, Muscle weakness   Date of Evaluation RE-EVAL 2020; EVAL 20    Additional Pertinent History R TKA 2020, L TKA 4 years ago, hypothyroid      Functional Outcome Measure Used LEFS   Functional Outcome Score 25/80 (2020); 19/80 (20)       Insurance: Primary: Payor: Olive Riddles /  /  / ,   Secondary:    Authorization Information: Unlimited visits per medical necessity   Visit # 6, 3/10 for progress note   Visits Allowed: Unlimited   Recertification Date: 3/72/8970   Physician Follow-Up: 2021   Physician Orders:    History of Present Illness: Patient presents for re-evaluation following R TKA performed 2020 by Dr. Anna Interiano. Patient is now using rolling walker and reports she is doing very well. She had a one night stay at the hospital and was discharged home yesterday morning. Patient is taking 1000mg Tylenol and Tramadol. Patient had a spinal block and received a pain medication injection prior to closure. \"I feel very positive about everything. \" Patient reports sleeping really well in bed last night. Patient is using an ice wrap issued by surgeon. Patient is wearing B ASHLEY hose for 2 weeks. Patient underwent R knee scope on 2020 to clean up the medial meniscus. Patient reports she is bone on bone. Knee arthroscopy was unsuccessful due to extent of OA.  Patient completed therapy prior to TKA to strengthen the knee. Patient also has history L TKA 4 years ago. SUBJECTIVE: Patient presented ready for dressing change and did provide clean dressing for therapist to use. Patient does believe her swelling is reduced and her R ankle is not as swollen. Patient has been trying to do HEP 2-3x/day and reports the standing program remains very challenging. Patient still needs help with car transfer but was able to do her own shower this morning and dress herself independently. Patient is no longer using walker at home (furniture walking around the home and cane during the night to use bathroom) but continues to use walker for community distances. Patient is taking medication for pain every 8 hours and taking oxycontin before sleeping at night. TREATMENT   Precautions:    Pain: R medial knee 6/10     X in shaded column indicates activity completed today   Modalities Parameters/  Location  Notes                     Manual Therapy Time/Technique  Notes                     Exercise/Intervention   Notes   Quad set 15 rep 5 sec  Verbal review   Heelslides 10 reps  x    Heelslides with strap       SAQ 10R 5 sec X Verbal review   SLR 10 reps  x Cues to maintain quad set; therapist min A   Supine Hip Abduction 10 R   Verbal review          Seated march 10B      Seated heelslide 10R 5 sec X    Seated LAQ 10R 5 sec x    Seated HS curl 10R 5 sec X Green theraband issued previously;            NuStep 5 minutes Level 1 X Seat 9, No arms   Standing march 10B  x BUE support    Standing HS curls 10R  X \"  \"   Standing knee flexion stretch on step 1R 20 sec X    Standing Balance activities on foam       TKE       Reverse TKE       Step Ups, fwd/lat       Standing 3-way hip 10B each way  X \"  \"   Mini squats 10  X Cues for equal weight shift   Standing gastroc, hamstring, knee flexion stretches 3R 20\" X Gastroc, hamstring, knee flexion today   Gait Training  5 minutes  Using rolling walker with cues for R knee flexion during swing phase and heelstrike on initial contact. R knee dressing change completed and new dressing donned   X No s/s of infection and incision is well approximated. Specific Interventions Next Treatment:  Initiate step ups. Progress standing exercises in parallel bars to promote increased R knee AROM and balance reactions. Sit<>stand transfer training and stair climbing review for safefty after surgery. Initiated step ups and NuStep as able. HEP instruction. Please measure AROM next visit. Activity/Treatment Tolerance:  [x]  Patient tolerated treatment well  []  Patient limited by fatigue  []  Patient limited by pain and swelling  []  Patient limited by medical complications  []  Other:     Assessment: Dressing change was completed and well tolerated this date, no signs and symptoms of infection present and steri strips are intact with tissue well approximated. New dressing is now intact and incision remains covered. Patient continues to have moderate to severe pain in R knee with medication every 8 hours and was advised to decrease HEP performance to just twice per day in presence of increased pain. Patient found knee flexion stretching at the step challenging today due to ROM limitations. Did move NuStep seat forward today along with addition of TKE and mini squats. Patient demonstrates independence with sit<>stand from various surfaces today. Body Structures/Functions/Activity Limitations: impaired activity tolerance, impaired balance, impaired endurance, impaired ROM, impaired safety awareness, impaired strength, pain and abnormal gait  Prognosis: good    Short Term Goals:  Time Frame: 6 weeks    1. Patient will report reduction of R knee pain to less than 2/10 with ability to ascend/descend stairs reciprocally and perform sit<>stand transfers. 2.  Patient will demonstrate increased R knee AROM to 0-120 degrees to improve car transfer and normalize gait pattern.     3.  Patient will demonstrate complete resolution of R knee edema. Long Term Goals:  Time Frame: 12 weeks    1. Patient will demonstrate increased LE strength to greater than or equal to 4+/5 to improve overall standing tolerance and ability to stand to perform ADLs at the sink. 2.  Patient will demonstrate improved function on LEFS with score of >65/80. 3.  Patient will be independent with comprehensive HEP. 4.  Patient will ambulate without antalgia, unlimited community distances without need for AD demonstrating normalized knee mechanics. Patient Education:   [x]  HEP/Education Completed: TKE, seated and standing knee flexion stretches  [email or web address] Access Code: [email or web address]     []  No new Education completed  [x]  Reviewed Prior HEP      [x]  Patient verbalized and/or demonstrated understanding of education provided. []  Patient unable to verbalize and/or demonstrate understanding of education provided. Will continue education. []  Barriers to learning:     PLAN:  Treatment Recommendations: Strengthening, Range of Motion, Balance Training, Functional Mobility Training, Transfer Training, Gait Training, Stair Training, Neuromuscular Re-education, Manual Therapy - Soft Tissue Mobilization, Pain Management, Home Exercise Program, Patient Education, Safety Education and Training, Integrative Dry Needling and Modalities    []  Plan of care initiated 12/31/2020. Plan to see patient 3 times per week for 12 weeks to address the treatment plan outlined above.   [x]  Continue with current plan of care  []  Modify plan of care as follows:    []  Hold pending physician visit  []  Discharge    Time In 1300   Time Out 1344   Timed Code Minutes: 44 min   Total Treatment Time: 44 min       Electronically Signed by: Philip Linares PT, DPT [id number]

## 2021-01-08 ENCOUNTER — HOSPITAL ENCOUNTER (OUTPATIENT)
Dept: PHYSICAL THERAPY | Age: 76
Setting detail: THERAPIES SERIES
Discharge: HOME | End: 2021-01-08
Payer: MEDICARE

## 2021-01-08 PROCEDURE — 97110 THERAPEUTIC EXERCISES: CPT

## 2021-01-08 NOTE — PROGRESS NOTES
[address] [facility]  PHYSICAL THERAPY  [] RE- EVALUATION  [x] DAILY NOTE (LAND) [] DAILY NOTE (AQUATIC ) [] PROGRESS NOTE [] DISCHARGE NOTE    [x] OUTPATIENT REHABILITATION [facility]   [] [place] [address]    [] [facility]   [] Alycia Remy    Date: 2021  Patient Name:  Brenda Briggs  : 1945  MRN: [id number]  CSN: [id number]    Referring Practitioner Wenceslao Franks MD   Diagnosis Unilateral primary osteoarthritis, right knee [M17.11]    Treatment Diagnosis R knee pain, R knee stiffness, Difficulty with ambulation, Unsteadiness, Muscle weakness   Date of Evaluation RE-EVAL 2020; EVAL 20    Additional Pertinent History R TKA 2020, L TKA 4 years ago, hypothyroid      Functional Outcome Measure Used LEFS   Functional Outcome Score 25/80 (2020); 19/80 (20)       Insurance: Primary: Payor: Justina Whitaker /  /  / ,   Secondary:    Authorization Information: Unlimited visits per medical necessity   Visit # 12, 4/10 for progress note   Visits Allowed: Unlimited   Recertification Date:    Physician Follow-Up: 2021   Physician Orders:    History of Present Illness: Patient presents for re-evaluation following R TKA performed 2020 by Dr. Jose L Posey. Patient is now using rolling walker and reports she is doing very well. She had a one night stay at the hospital and was discharged home yesterday morning. Patient is taking 1000mg Tylenol and Tramadol. Patient had a spinal block and received a pain medication injection prior to closure. \"I feel very positive about everything. \" Patient reports sleeping really well in bed last night. Patient is using an ice wrap issued by surgeon. Patient is wearing B ASHLEY hose for 2 weeks. Patient underwent R knee scope on 2020 to clean up the medial meniscus. Patient reports she is bone on bone. Knee arthroscopy was unsuccessful due to extent of OA.  Patient completed therapy prior to TKA to strengthen the knee. Patient also has history L TKA 4 years ago. SUBJECTIVE:  Patient reports she is having a lot of swelling and stiffness but it is slowly getting better. Patient walking with rolling walker into therapy session. TREATMENT   Precautions:    Pain:  6/10 Right knee    X in shaded column indicates activity completed today   Modalities Parameters/  Location  Notes               Manual Therapy Time/Technique  Notes               Exercise/Intervention   Notes   Quad set 15 rep 5 sec  Verbal review   Heelslides 10x  X    Heelslides with strap 10x 5 seconds X    SAQ 10x Right 5 seconds  Verbal review   SLR 10x Right  X Cues to maintain quad set; therapist min A   Supine Hip Abduction 10 R   Verbal review          Seated march 10B      Seated heelslide 10x Right 5 seconds X    Seated LAQ 10x Right 5 seconds X    Seated HS curl 10R 5 sec  Green theraband issued previously; NuStep 5 minutes Level 2 X Seat 9, No arms; Took time but patient was able to get Right foot on pedal without assist   Standing march 10x Bilateral   X Bilateral UE support    Standing HS curls 10x Right  X Bilateral UE support    Standing knee flexion stretch on step 1R 20 sec     Standing Balance activities on foam       TKE       Reverse TKE       Step Ups, fwd/lat       Standing 3-way hip 10x Bilateral each way  X Bilateral UE support   Mini squats 10x  X Cues for equal weight shift   Standing gastroc, hamstring, knee flexion stretches at steps 3x Right 20 seconds X    Gait Training  5 minutes  Using rolling walker with cues for R knee flexion during swing phase and heelstrike on initial contact. Right knee AROM lacking 5 degrees from neutral to 93 degrees flexion;  AAROM 96 degrees flexion   X Measured range of motion in Supine          R knee dressing change completed and new dressing donned    No s/s of infection and incision is well approximated.       Specific Interventions Next Treatment: Initiate step ups. Progress standing exercises in parallel bars to promote increased R knee AROM and balance reactions. Sit<>stand transfer training and stair climbing review for safefty after surgery. Initiated step ups and NuStep as able. HEP instruction. Please measure AROM next visit. Activity/Treatment Tolerance:  [x]  Patient tolerated treatment well  []  Patient limited by fatigue  []  Patient limited by pain and swelling  []  Patient limited by medical complications  []  Other:     Assessment:  Patient tolerated therapy session well. Demonstrated improvement with range of motion today despite challenges with range of motion exercises. Moderate swelling noted throughout Right leg. Short Term Goals:  Time Frame: 6 weeks    1. Patient will report reduction of R knee pain to less than 2/10 with ability to ascend/descend stairs reciprocally and perform sit<>stand transfers. 2.  Patient will demonstrate increased R knee AROM to 0-120 degrees to improve car transfer and normalize gait pattern. 3.  Patient will demonstrate complete resolution of R knee edema. Long Term Goals:  Time Frame: 12 weeks    1. Patient will demonstrate increased LE strength to greater than or equal to 4+/5 to improve overall standing tolerance and ability to stand to perform ADLs at the sink. 2.  Patient will demonstrate improved function on LEFS with score of >65/80. 3.  Patient will be independent with comprehensive HEP. 4.  Patient will ambulate without antalgia, unlimited community distances without need for AD demonstrating normalized knee mechanics. Patient Education:   [x]  HEP/Education Completed: TKE, seated and standing knee flexion stretches  [employer] Access Code: [email or web address]     []  No new Education completed  [x]  Reviewed Prior HEP      [x]  Patient verbalized and/or demonstrated understanding of education provided.   []  Patient unable to verbalize and/or demonstrate understanding of education provided. Will continue education. []  Barriers to learning:     PLAN:  Treatment Recommendations: Strengthening, Range of Motion, Balance Training, Functional Mobility Training, Transfer Training, Gait Training, Stair Training, Neuromuscular Re-education, Manual Therapy - Soft Tissue Mobilization, Pain Management, Home Exercise Program, Patient Education, Safety Education and Training, Integrative Dry Needling and Modalities    []  Plan of care initiated 12/31/2020. Plan to see patient 3 times per week for 12 weeks to address the treatment plan outlined above.   [x]  Continue with current plan of care  []  Modify plan of care as follows:    []  Hold pending physician visit  []  Discharge    Time In 1345   Time Out 1440   Timed Code Minutes: 55 min   Total Treatment Time: 55 min       Electronically Signed by: Geoff Staton

## 2021-01-11 ENCOUNTER — HOSPITAL ENCOUNTER (OUTPATIENT)
Dept: PHYSICAL THERAPY | Age: 76
Setting detail: THERAPIES SERIES
Discharge: HOME | End: 2021-01-11
Payer: MEDICARE

## 2021-01-11 PROCEDURE — 97110 THERAPEUTIC EXERCISES: CPT

## 2021-01-11 NOTE — PROGRESS NOTES
[address] [facility]  PHYSICAL THERAPY  [] RE- EVALUATION  [x] DAILY NOTE (LAND) [] DAILY NOTE (AQUATIC ) [] PROGRESS NOTE [] DISCHARGE NOTE    [x] OUTPATIENT REHABILITATION [facility]   [] [place] [address]    [] [facility]   [] Flip Maciel    Date: 2021  Patient Name:  Valorie Michael  : 1945  MRN: [id number]  CSN: [id number]    Referring Practitioner Lisha Hickey MD   Diagnosis Unilateral primary osteoarthritis, right knee [M17.11]    Treatment Diagnosis R knee pain, R knee stiffness, Difficulty with ambulation, Unsteadiness, Muscle weakness   Date of Evaluation RE-EVAL 2020; EVAL 20    Additional Pertinent History R TKA 2020, L TKA 4 years ago, hypothyroid      Functional Outcome Measure Used LEFS   Functional Outcome Score 25/80 (2020); 19/80 (20)       Insurance: Primary: Payor: Duy Russo /  /  / ,   Secondary:    Authorization Information: Unlimited visits per medical necessity   Visit # 15, 5/10 for progress note   Visits Allowed: Unlimited   Recertification Date:    Physician Follow-Up: 2021   Physician Orders:    History of Present Illness: Patient presents for re-evaluation following R TKA performed 2020 by Dr. Julius Robles. Patient is now using rolling walker and reports she is doing very well. She had a one night stay at the hospital and was discharged home yesterday morning. Patient is taking 1000mg Tylenol and Tramadol. Patient had a spinal block and received a pain medication injection prior to closure. \"I feel very positive about everything. \" Patient reports sleeping really well in bed last night. Patient is using an ice wrap issued by surgeon. Patient is wearing B ASHLEY hose for 2 weeks. Patient underwent R knee scope on 2020 to clean up the medial meniscus. Patient reports she is bone on bone. Knee arthroscopy was unsuccessful due to extent of OA.  Patient completed therapy prior to TKA to strengthen the knee. Patient also has history L TKA 4 years ago. SUBJECTIVE:  Patient presents requesting therapist check incision to ensure it looks to be healing properly as there was a section along mid incision at lateral R knee joint line that was pink in color. Patient was not wearing ASHLEY hose as a result. TREATMENT   Precautions:    Pain:  6/10 Right knee- deep aching pain    X in shaded column indicates activity completed today   Modalities Parameters/  Location  Notes               Manual Therapy Time/Technique  Notes               Exercise/Intervention   Notes   Quad set 15 rep 5 sec  Verbal review   Heelslides 10x  X    Heelslides with strap 10x 5 seconds X    SAQ 10x Right 5 seconds  Verbal review   SLR 10x Right  X Cues to maintain quad set; therapist min A   Supine Hip Abduction 10 R   Verbal review          Seated march 10B      Seated heelslide 10x Right 5 seconds X    Seated LAQ 10x Right 5 seconds X    Seated HS curl 10R 5 sec  Green theraband issued previously; NuStep 5 minutes Level 2 X Seat 9, No arms; Took time but patient was able to get Right foot on pedal without assist   Standing march 15x Bilateral   X Bilateral UE support    Standing HS curls 10x Right  X Bilateral UE support    Standing Balance activities on foam       TKE       Reverse TKE       Step Ups, fwd/lat 10 fwd Right 6 inch step X    Step Taps- bend knee to place R foot on step without hiking R hip 10 Right  X    Standing 3-way hip 15x Bilateral each way  X Bilateral UE support; cues for upright posture   Mini squats 10x  X Cues for equal weight shift   Standing gastroc, hamstring, knee flexion stretches at steps 3x Right 20 seconds X    Gait Training  5 minutes X Using straight cane with cues for R knee flexion during swing phase and heelstrike on initial contact.            Right knee AROM lacking 5 degrees from neutral to 93 degrees flexion;  AAROM 96 degrees flexion    Measured range of motion in Supine          R knee dressing change completed and new dressing donned    No s/s of infection and incision is well approximated. Specific Interventions Next Treatment:  Initiate step ups. Progress standing exercises in parallel bars to promote increased R knee AROM and balance reactions. Sit<>stand transfer training and stair climbing review for safefty after surgery. Initiated step ups and NuStep as able. HEP instruction. Please measure AROM next visit. Activity/Treatment Tolerance:  [x]  Patient tolerated treatment well  []  Patient limited by fatigue  []  Patient limited by pain and swelling  []  Patient limited by medical complications  []  Other:     Assessment:  Therapist visualized R knee tissue surrounding dressing as that will not be removed again until [address] [facility] surgeon appointment. Normal post-operative tissue healing is noted, no bright redness or additional warmth/swelling is noted. Patient was advised that the tissue that was able to be observed looks normal. Addition of step ups was well tolerated today from pain standpoint and patient was able to complete with minimal UE support. Ongoing cues to minimize hip hiking and to isolate knee flexion ROM with step ups and marching activities to hopefully improve gait mechanics. Patient was able to ambulate within the clinic using the cane with good technique today and was told to switch to the cane with exception of crowded community environments or when fatigued. Patient requested therapist not measure ROM today due to fear of it being decreased with swelling today. Short Term Goals:  Time Frame: 6 weeks    1. Patient will report reduction of R knee pain to less than 2/10 with ability to ascend/descend stairs reciprocally and perform sit<>stand transfers. 2.  Patient will demonstrate increased R knee AROM to 0-120 degrees to improve car transfer and normalize gait pattern.     3.  Patient will demonstrate complete resolution of R

## 2021-01-13 ENCOUNTER — HOSPITAL ENCOUNTER (OUTPATIENT)
Dept: PHYSICAL THERAPY | Age: 76
Setting detail: THERAPIES SERIES
Discharge: HOME | End: 2021-01-13
Payer: MEDICARE

## 2021-01-13 PROCEDURE — 97110 THERAPEUTIC EXERCISES: CPT

## 2021-01-13 RX ORDER — AMOXICILLIN AND CLAVULANATE POTASSIUM 875; 125 MG/1; MG/1
1 TABLET, FILM COATED ORAL 2 TIMES DAILY
Qty: 14 TABLET | Refills: 0 | Status: SHIPPED | OUTPATIENT
Start: 2021-01-13 | End: 2021-03-04 | Stop reason: SDUPTHER

## 2021-01-13 NOTE — PROGRESS NOTES
[address] [facility]  PHYSICAL THERAPY  [] RE- EVALUATION  [x] DAILY NOTE (LAND) [] DAILY NOTE (AQUATIC ) [] PROGRESS NOTE [] DISCHARGE NOTE    [x] OUTPATIENT REHABILITATION [facility]   [] [place] [address]    [] [facility]   [] Yamila Patino    Date: 2021  Patient Name:  Danii Melo  : 1945  MRN: [id number]  CSN: [id number]    Referring Practitioner Maricel Arteaga MD   Diagnosis Unilateral primary osteoarthritis, right knee [M17.11]    Treatment Diagnosis R knee pain, R knee stiffness, Difficulty with ambulation, Unsteadiness, Muscle weakness   Date of Evaluation RE-EVAL 2020; EVAL 20    Additional Pertinent History R TKA 2020, L TKA 4 years ago, hypothyroid      Functional Outcome Measure Used LEFS   Functional Outcome Score 25/80 (2020); 19/80 (20)       Insurance: Primary: Payor: Esther Young /  /  / ,   Secondary:    Authorization Information: Unlimited visits per medical necessity   Visit # 15, 6/10 for progress note   Visits Allowed: Unlimited   Recertification Date: 3/32/7160   Physician Follow-Up: 2/10/2021   Physician Orders:    History of Present Illness: Patient presents for re-evaluation following R TKA performed 2020 by Dr. Sherrie Lay. Patient is now using rolling walker and reports she is doing very well. She had a one night stay at the hospital and was discharged home yesterday morning. Patient is taking 1000mg Tylenol and Tramadol. Patient had a spinal block and received a pain medication injection prior to closure. \"I feel very positive about everything. \" Patient reports sleeping really well in bed last night. Patient is using an ice wrap issued by surgeon. Patient is wearing B ASHLEY hose for 2 weeks. Patient underwent R knee scope on 2020 to clean up the medial meniscus. Patient reports she is bone on bone. Knee arthroscopy was unsuccessful due to extent of OA.  Patient completed therapy prior to TKA to strengthen the knee. Patient also has history L TKA 4 years ago. SUBJECTIVE:  Patient saw surgeon this morning who was pleased with progress and voiced no concerns for ongoing swelling, incision healing, or pain levels. Patient was prescribed Flexeril to aid in sleep and was instructed to take Celebrex as tolerated. Patient voiced that the travel today and sitting in car has increased pain to 6/10 in R knee. Patient no longer has a dressing on R knee and steri strips remain intact. Patient is using cane at this time. Patient was told she can discontinue use of ASHLEY hose. TREATMENT   Precautions:    Pain:  6/10 Right knee- deep aching pain    X in shaded column indicates activity completed today   Modalities Parameters/  Location  Notes               Manual Therapy Time/Technique  Notes               Exercise/Intervention   Notes   Quad set 15 rep 5 sec  Verbal review   Heelslides 10x  X    Heelslides with strap 10x 5 seconds X R knee AAROM 101 deg   SAQ 10x Right 5 seconds  Verbal review   SLR 15x Right  X Cues to maintain quad set; therapist min A   Supine Hip Abduction 10 R   Verbal review          Seated march 10B      Seated heelslide 10x Right 5 seconds X    Seated LAQ 10x Right 5 seconds X    Seated HS curl 10R 5 sec  Green theraband issued previously;            NuStep 5 minutes Level 2 X Seat 9, No arms   Standing march 15x Bilateral   X Bilateral UE support    Standing HS curls 15x Right  X Bilateral UE support    Standing Balance activities on foam       TKE       Reverse TKE       Step Ups, fwd/lat 10 fwd Right 6 inch step  Attempted; however patient declined due to pain and fatigue   Step Taps- bend knee to place R foot on step (such as marching onto step) without hiking R hip 10 Right  X    Standing 3-way hip 15x Bilateral each way  X Bilateral UE support; cues for upright posture   Mini squats 10x  X Cues for equal weight shift   Standing gastroc, hamstring, knee flexion stretches at steps 3x Right 20 seconds X    Gait Training  5 minutes X Using straight cane with cues for R knee flexion during swing phase and heelstrike on initial contact. Specific Interventions Next Treatment: Progress standing exercises in parallel bars to promote increased R knee AROM and balance reactions. Sit<>stand transfer training and stair climbing review for safefty after surgery. Activity/Treatment Tolerance:  [x]  Patient tolerated treatment well  []  Patient limited by fatigue  []  Patient limited by pain and swelling  []  Patient limited by medical complications  []  Other:     Assessment:  Patient presented fatigued and in moderate pain from surgeon follow up appointment earlier today. Patient needed verbal encouragement during visit today. Patient deferred step ups today even on lower step height due to pain. Patient did demonstrate increased R knee flexion today with heelslides. Patient is making slow but steady progress and benefits from ongoing skilled PT to address R knee pain, stiffness, weakness, and abnormalities of gait for full return to active lifestyle. Short Term Goals:  Time Frame: 6 weeks    1. Patient will report reduction of R knee pain to less than 2/10 with ability to ascend/descend stairs reciprocally and perform sit<>stand transfers. 2.  Patient will demonstrate increased R knee AROM to 0-120 degrees to improve car transfer and normalize gait pattern. 3.  Patient will demonstrate complete resolution of R knee edema. Long Term Goals:  Time Frame: 12 weeks    1. Patient will demonstrate increased LE strength to greater than or equal to 4+/5 to improve overall standing tolerance and ability to stand to perform ADLs at the sink. 2.  Patient will demonstrate improved function on LEFS with score of >65/80. 3.  Patient will be independent with comprehensive HEP.     4.  Patient will ambulate without antalgia, unlimited community distances without need for AD demonstrating normalized knee mechanics. Patient Education:   []  HEP/Education Completed:   Yan[employer] Jacobs Access Code: [email or web address]     []  No new Education completed  [x]  Reviewed Prior HEP      [x]  Patient verbalized and/or demonstrated understanding of education provided. []  Patient unable to verbalize and/or demonstrate understanding of education provided. Will continue education. []  Barriers to learning:     PLAN:  Treatment Recommendations: Strengthening, Range of Motion, Balance Training, Functional Mobility Training, Transfer Training, Gait Training, Stair Training, Neuromuscular Re-education, Manual Therapy - Soft Tissue Mobilization, Pain Management, Home Exercise Program, Patient Education, Safety Education and Training, Integrative Dry Needling and Modalities    []  Plan of care initiated 12/31/2020. Plan to see patient 3 times per week for 12 weeks to address the treatment plan outlined above.   [x]  Continue with current plan of care  []  Modify plan of care as follows:    []  Hold pending physician visit  []  Discharge    Time In 1350   Time Out 1435   Timed Code Minutes: 45 min   Total Treatment Time: 45 min       Electronically Signed by: Alfonso Oleary

## 2021-01-15 ENCOUNTER — HOSPITAL ENCOUNTER (OUTPATIENT)
Dept: PHYSICAL THERAPY | Age: 76
Setting detail: THERAPIES SERIES
Discharge: HOME | End: 2021-01-15
Payer: MEDICARE

## 2021-01-15 PROCEDURE — 97110 THERAPEUTIC EXERCISES: CPT

## 2021-01-15 NOTE — PROGRESS NOTES
[address] [facility]  PHYSICAL THERAPY  [] RE- EVALUATION  [x] DAILY NOTE (LAND) [] DAILY NOTE (AQUATIC ) [] PROGRESS NOTE [] DISCHARGE NOTE    [x] OUTPATIENT REHABILITATION [facility]   [] [place] [address]    [] [facility]   [] Yamila Patino    Date: 1/15/2021  Patient Name:  Danii Melo  : 1945  MRN: [id number]  CSN: [id number]    Referring Practitioner Maricel Arteaga MD   Diagnosis Unilateral primary osteoarthritis, right knee [M17.11]    Treatment Diagnosis R knee pain, R knee stiffness, Difficulty with ambulation, Unsteadiness, Muscle weakness   Date of Evaluation RE-EVAL 2020; EVAL 20    Additional Pertinent History R TKA 2020, L TKA 4 years ago, hypothyroid      Functional Outcome Measure Used LEFS   Functional Outcome Score 25/80 (2020); 19/80 (20)       Insurance: Primary: Payor: Esther Young /  /  / ,   Secondary:    Authorization Information: Unlimited visits per medical necessity   Visit # 15, 7/10 for progress note   Visits Allowed: Unlimited   Recertification Date: 1751   Physician Follow-Up: 2/10/2021   Physician Orders:    History of Present Illness: Patient presents for re-evaluation following R TKA performed 2020 by Dr. Sherrie Lay. Patient is now using rolling walker and reports she is doing very well. She had a one night stay at the hospital and was discharged home yesterday morning. Patient is taking 1000mg Tylenol and Tramadol. Patient had a spinal block and received a pain medication injection prior to closure. \"I feel very positive about everything. \" Patient reports sleeping really well in bed last night. Patient is using an ice wrap issued by surgeon. Patient is wearing B ASHLEY hose for 2 weeks. Patient underwent R knee scope on 2020 to clean up the medial meniscus. Patient reports she is bone on bone. Knee arthroscopy was unsuccessful due to extent of OA.  Patient completed therapy prior to TKA to strengthen the knee. Patient also has history L TKA 4 years ago. SUBJECTIVE:   Patient reports she is working exercises on home daily. States that the swelling has been a little better since starting the Celebrex again. Patient is only using the cane when she leaves her home. TREATMENT   Precautions:    Pain:  4/10 Right knee - deep aching pain    X in shaded column indicates activity completed today   Modalities Parameters/  Location  Notes               Manual Therapy Time/Technique  Notes               Exercise/Intervention   Notes   Quad set 15 rep 5 sec  Verbal review   Heelslides 10x      Heelslides with strap 10x 5 seconds X Right knee AAROM 105 degrees flexion   SAQ 10x Right 5 seconds  Verbal review   SLR 15x Right  X Cues to maintain quad set; therapist min A   Supine Hip Abduction 10 R   Verbal review          Seated march 10B      Seated heelslide 10x Right 5 seconds X    Seated LAQ 10x Right 5 seconds X    Seated HS curl 10R 5 sec  Green theraband issued previously; NuStep 5 minutes Level 2 X Seat 9, No arms   Standing march on Blue foam 15x Bilateral   X Bilateral UE support    Standing HS curls on Blue foam 15x Right  X Bilateral UE support    Standing Balance activities on foam       TKE       Reverse TKE       Step Ups 6 inch forward/lateral Right closed chain 15x 6 inch step X    Step Taps- bend knee to place R foot on step (such as marching onto step) without hiking R hip 10x Right  X    Standing 3-way hip on Blue Foam 15x Bilateral each way  X Bilateral UE support; cues for upright posture   Mini squats on Blue foam 15x  X Cues for equal weight shift   Standing gastroc, hamstring, knee flexion stretches at steps 3x Right 20 seconds X    Gait Training  5 minutes X Using straight cane with cues for R knee flexion during swing phase and heelstrike on initial contact.              Specific Interventions Next Treatment: Progress standing exercises in parallel bars to promote increased R knee AROM and balance reactions. Sit<>stand transfer training and stair climbing review for safefty after surgery. Activity/Treatment Tolerance:  [x]  Patient tolerated treatment well  []  Patient limited by fatigue  []  Patient limited by pain and swelling []  Patient limited by medical complications  []  Other:     Assessment:  Patient demonstrating improvement with range of motion for knee flexion today. Patient was able to complete step ups again today without increased pain. Added blue foam with strengthening exercises exercises today to increased challenge with balance. Short Term Goals:  Time Frame: 6 weeks    1. Patient will report reduction of R knee pain to less than 2/10 with ability to ascend/descend stairs reciprocally and perform sit<>stand transfers. 2.  Patient will demonstrate increased R knee AROM to 0-120 degrees to improve car transfer and normalize gait pattern. 3.  Patient will demonstrate complete resolution of R knee edema. Long Term Goals:  Time Frame: 12 weeks    1. Patient will demonstrate increased LE strength to greater than or equal to 4+/5 to improve overall standing tolerance and ability to stand to perform ADLs at the sink. 2.  Patient will demonstrate improved function on LEFS with score of >65/80. 3.  Patient will be independent with comprehensive HEP. 4.  Patient will ambulate without antalgia, unlimited community distances without need for AD demonstrating normalized knee mechanics. Patient Education:   [x]  HEP/Education Completed:  Balance on blue foam.    [employer] Access Code: [email or web address]  []  No new Education completed  [x]  Reviewed Prior HEP      [x]  Patient verbalized and/or demonstrated understanding of education provided. []  Patient unable to verbalize and/or demonstrate understanding of education provided. Will continue education.   []  Barriers to learning:     PLAN:  Treatment Recommendations: Strengthening, Range of Motion, Balance Training, Functional Mobility Training, Transfer Training, Gait Training, Stair Training, Neuromuscular Re-education, Manual Therapy - Soft Tissue Mobilization, Pain Management, Home Exercise Program, Patient Education, Safety Education and Training, Integrative Dry Needling and Modalities    []  Plan of care initiated 12/31/2020. Plan to see patient 3 times per week for 12 weeks to address the treatment plan outlined above.   [x]  Continue with current plan of care  []  Modify plan of care as follows:    []  Hold pending physician visit  []  Discharge    Time In 1300   Time Out 1345   Timed Code Minutes: 45 min   Total Treatment Time: 45 min       Electronically Signed by: Yanique Sumner

## 2021-01-18 ENCOUNTER — HOSPITAL ENCOUNTER (OUTPATIENT)
Dept: PHYSICAL THERAPY | Age: 76
Setting detail: THERAPIES SERIES
Discharge: HOME | End: 2021-01-18
Payer: MEDICARE

## 2021-01-18 PROCEDURE — 97110 THERAPEUTIC EXERCISES: CPT

## 2021-01-18 NOTE — PROGRESS NOTES
[address] [facility]  PHYSICAL THERAPY  [] RE- EVALUATION  [x] DAILY NOTE (LAND) [] DAILY NOTE (AQUATIC ) [] PROGRESS NOTE [] DISCHARGE NOTE    [x] OUTPATIENT REHABILITATION CENTER [facility]   [] [place] [address]    [] [facility]   [] Doc Has    Date: 2021  Patient Name:  Suze Kothari  : 1945  MRN: [id number]  CSN: [id number]    Referring Practitioner Kody Garcia MD   Diagnosis Unilateral primary osteoarthritis, right knee [M17.11]    Treatment Diagnosis R knee pain, R knee stiffness, Difficulty with ambulation, Unsteadiness, Muscle weakness   Date of Evaluation RE-EVAL 2020; EVAL 20    Additional Pertinent History R TKA 2020, L TKA 4 years ago, hypothyroid      Functional Outcome Measure Used LEFS   Functional Outcome Score 25/80 (2020); 19/ (20)       Insurance: Primary: Payor: Darek Eugene /  /  / ,   Secondary:    Authorization Information: Unlimited visits per medical necessity   Visit # 16, 8/10 for progress note   Visits Allowed: Unlimited   Recertification Date:    Physician Follow-Up: 2/10/2021   Physician Orders:    History of Present Illness: Patient presents for re-evaluation following R TKA performed 2020 by Dr. Natasha Dia. Patient is now using rolling walker and reports she is doing very well. She had a one night stay at the hospital and was discharged home yesterday morning. Patient is taking 1000mg Tylenol and Tramadol. Patient had a spinal block and received a pain medication injection prior to closure. \"I feel very positive about everything. \" Patient reports sleeping really well in bed last night. Patient is using an ice wrap issued by surgeon. Patient is wearing B ASHLEY hose for 2 weeks. Patient underwent R knee scope on 2020 to clean up the medial meniscus. Patient reports she is bone on bone. Knee arthroscopy was unsuccessful due to extent of OA.  Patient completed therapy prior to TKA to strengthen the knee. Patient also has history L TKA 4 years ago. SUBJECTIVE:   Patient reports she is stiff today. Use of cane just to walk in and out of therapy. TREATMENT   Precautions:    Pain:  3/10 Right knee - deep aching pain    X in shaded column indicates activity completed today   Modalities Parameters/  Location  Notes               Manual Therapy Time/Technique  Notes               Exercise/Intervention   Notes   Quad set 15 rep 5 sec  Verbal review   Heelslides 10x      Heelslides with strap 15x 5 seconds X Right knee AROM 105 degrees flexion   SAQ 10x Right 5 seconds  Verbal review   SLR 15x Right  X Cues to maintain quad set; therapist min A   Supine Hip Abduction 10 R   Verbal review          Seated march 10B      Seated heelslide 10x Right 5 seconds     Seated LAQ 10x Right 5 seconds X    Seated HS curl 10R 5 sec  Green theraband issued previously; NuStep Level 3 5 minutes  X Seat 9, No arms   Standing march on Blue foam 20x Bilateral   X Bilateral UE support    Standing HS curls on Blue foam 15x Right  X Bilateral UE support           TKE       Reverse TKE       Step Ups 6 inch forward/lateral Right closed chain 15x 6 inch step X    Step Taps- bend knee to place R foot on step (such as marching onto step) without hiking R hip 10x Right  X    Eccentric step downs    (Add next session)   Standing 3-way hip on Blue Foam 15x Bilateral each way  X Bilateral UE support; cues for upright posture   Mini squats on Blue foam 15x  X Cues for equal weight shift   Standing gastroc, hamstring, knee flexion stretches at steps 3x Right 20 seconds X    Gait Training  5 minutes  Using straight cane with cues for R knee flexion during swing phase and heelstrike on initial contact. Up and Down 4 steps 2 trials  X Patient using non-reciprocal technique.                     Specific Interventions Next Treatment: Progress standing exercises in parallel bars to promote increased R knee AROM and balance reactions. Sit<>stand transfer training and stair climbing review for safefty after surgery. Activity/Treatment Tolerance:  [x]  Patient tolerated treatment well  []  Patient limited by fatigue  []  Patient limited by pain and swelling []  Patient limited by medical complications  []  Other:     Assessment:  Patient tolerating exercises well today during therapy session. Patient making slow gains with knee flexion range of motion. Trial of up and down steps today. No pain noted with this activity, however patient demonstrated weakness with eccentric control. Plan to add eccentric lowering at steps next session. Short Term Goals:  Time Frame: 6 weeks    1. Patient will report reduction of R knee pain to less than 2/10 with ability to ascend/descend stairs reciprocally and perform sit<>stand transfers. 2.  Patient will demonstrate increased R knee AROM to 0-120 degrees to improve car transfer and normalize gait pattern. 3.  Patient will demonstrate complete resolution of R knee edema. Long Term Goals:  Time Frame: 12 weeks    1. Patient will demonstrate increased LE strength to greater than or equal to 4+/5 to improve overall standing tolerance and ability to stand to perform ADLs at the sink. 2.  Patient will demonstrate improved function on LEFS with score of >65/80. 3.  Patient will be independent with comprehensive HEP. 4.  Patient will ambulate without antalgia, unlimited community distances without need for AD demonstrating normalized knee mechanics. Patient Education:   [x]  HEP/Education Completed:  Balance on blue foam.    [employer] Access Code: [email or web address]  []  No new Education completed  [x]  Reviewed Prior HEP      [x]  Patient verbalized and/or demonstrated understanding of education provided. []  Patient unable to verbalize and/or demonstrate understanding of education provided. Will continue education.   []  Barriers to learning:     PLAN:  Treatment Recommendations: Strengthening, Range of Motion, Balance Training, Functional Mobility Training, Transfer Training, Gait Training, Stair Training, Neuromuscular Re-education, Manual Therapy - Soft Tissue Mobilization, Pain Management, Home Exercise Program, Patient Education, Safety Education and Training, Integrative Dry Needling and Modalities    []  Plan of care initiated 12/31/2020. Plan to see patient 3 times per week for 12 weeks to address the treatment plan outlined above.   [x]  Continue with current plan of care  []  Modify plan of care as follows:    []  Hold pending physician visit  []  Discharge    Time In 1300   Time Out 1345   Timed Code Minutes: 45 min   Total Treatment Time: 45 min       Electronically Signed by: Tres Marcelo

## 2021-01-20 ENCOUNTER — HOSPITAL ENCOUNTER (OUTPATIENT)
Dept: PHYSICAL THERAPY | Age: 76
Setting detail: THERAPIES SERIES
Discharge: HOME | End: 2021-01-20
Payer: MEDICARE

## 2021-01-20 PROCEDURE — 97110 THERAPEUTIC EXERCISES: CPT

## 2021-01-20 NOTE — PROGRESS NOTES
[address] [facility]  PHYSICAL THERAPY  [] RE- EVALUATION  [] DAILY NOTE (LAND) [] DAILY NOTE (AQUATIC ) [x] PROGRESS NOTE [] DISCHARGE NOTE    [x] OUTPATIENT REHABILITATION [facility]   [] [place] [address]    [] [facility]   [] Alycia Remy    Date: 2021  Patient Name:  Brenda Briggs  : 1945  MRN: [id number]  CSN: [id number]    Referring Practitioner Wenceslao Franks MD   Diagnosis Unilateral primary osteoarthritis, right knee [M17.11]    Treatment Diagnosis R knee pain, R knee stiffness, Difficulty with ambulation, Unsteadiness, Muscle weakness   Date of Evaluation RE-EVAL 2020; EVAL 20    Additional Pertinent History R TKA 2020, L TKA 4 years ago, hypothyroid      Functional Outcome Measure Used LEFS   Functional Outcome Score 46/80 (2021); 25/80 (2020); 19/80 (20)       Insurance: Primary: Payor: Justina Whitaker /  /  / ,   Secondary:    Authorization Information: Unlimited visits per medical necessity   Visit # 16, ; next visit will be 1/10 for next progress note   Visits Allowed: Unlimited   Recertification Date: [id number]   Physician Follow-Up: 2/10/2021   Physician Orders:    History of Present Illness: Patient presents for re-evaluation following R TKA performed 2020 by Dr. Jose L Posey. Patient is now using rolling walker and reports she is doing very well. She had a one night stay at the hospital and was discharged home yesterday morning. Patient is taking 1000mg Tylenol and Tramadol. Patient had a spinal block and received a pain medication injection prior to closure. \"I feel very positive about everything. \" Patient reports sleeping really well in bed last night. Patient is using an ice wrap issued by surgeon. Patient is wearing B ASHLEY hose for 2 weeks. Patient underwent R knee scope on 2020 to clean up the medial meniscus. Patient reports she is bone on bone.  Knee arthroscopy was unsuccessful due to extent of OA. Patient completed therapy prior to TKA to strengthen the knee. Patient also has history L TKA 4 years ago. SUBJECTIVE:   Patient reports that the pain in her R knee is 2/10 today and she is now sleeping in bed the entire night. Patient reports overall improvement of 80% at this time. Transfers from low, soft surface remain challenging and reliant on UE support. Patient is getting in and out of the car more easily. Patient still has not resumed driving. RLE swelling is significantly reduced in the last week and patient is done wearing ASHLEY hose. Patient is only using cane when leaving the home. TREATMENT   Precautions:    Pain:  2/10 Right knee - deep aching pain    X in shaded column indicates activity completed today   Modalities Parameters/  Location  Notes               Manual Therapy Time/Technique  Notes               Exercise/Intervention   Notes   Quad set 15 rep 5 sec  Verbal review   Heelslides 10x      Heelslides with strap 15x 5 seconds X Right knee AROM 108 degrees flexion   SAQ 10x Right 5 seconds  Verbal review   SLR 15x Right  X Cues to maintain quad set; therapist min A   Supine Hip Abduction 10 R   Verbal review          Seated march 10B      Seated heelslide 10x Right 5 seconds     Seated LAQ 10x Right 5 seconds     Seated HS curl 10R 5 sec  Green theraband issued previously;            NuStep Level 3 5 minutes  X Seat 9, No arms   Standing march on Blue foam 20x Bilateral   X Bilateral UE support    Standing HS curls on Blue foam 15x Right  X Bilateral UE support           TKE       Reverse TKE       Step Ups 6 inch forward/lateral Right closed chain 15x 6 inch step X    Step Taps- bend knee to place R foot on step (such as marching onto step) without hiking R hip 10x Right  X    Eccentric step downs 10x Right  6 inch X    Standing 3-way hip on Blue Foam 15x Bilateral each way  X Bilateral UE support; cues for upright posture   Mini squats on Blue foam 15x  X Cues for equal weight shift   Standing gastroc, hamstring, knee flexion stretches at steps 3x Right 20 seconds X    Gait Training  5 minutes  Using straight cane with cues for R knee flexion during swing phase and heelstrike on initial contact. Up and Down 4 steps 2 trials  X Patient using non-reciprocal technique. Rocker Board taps and hover 10 each way  X    NK Table quads and hamstrings         Specific Interventions Next Treatment: Add NK Table next visit. Progress RLE strengthening exercises and step up tasks with emphasis on knee flexion ROM. Activity/Treatment Tolerance:  [x]  Patient tolerated treatment well  []  Patient limited by fatigue  []  Patient limited by pain and swelling []  Patient limited by medical complications  []  Other:     Assessment:  Addition of rockerboard was well tolerated today but did reveal balance deficits especially in frontal plane. Although patient continues to demonstrate deficits in R knee AROM especially flexion and weakness persists in RLE globally; MMT strength testing and ROM assessment today revealed good gains in these areas. LEFS score has increased significantly since post surgery assessment. Patient demonstrates improved ambulation, now using the cane only for community distances. See goal assessment below. Patient is in need of ongoing skilled PT to address ongoing R knee pain, stiffness, weakness, and mobility deficits that impact functional mobility. Short Term Goals:  Time Frame: 6 weeks    1. Patient will report reduction of R knee pain to less than 2/10 with ability to ascend/descend stairs reciprocally and perform sit<>stand transfers. [] Goal Met [x] Goal Not Met [x] Continue Goal [] Discontinue Goal  [] Revise Goal  Goal Assessment: Patient still has days where pain elevates to 7/10 but overall it remains less than 3/10 average. Patient is only taking Tramadol for therapy sessions and has stopped use of it during the day or for sleeping.      2.  Patient noted to advance RLE. Distance limited 50% at this time. \"I wouldn't be able to do grocery shopping without my cane or cart. \"         Patient Education:   []  HEP/Education Completed:      [address] Access Code: [email or web address]  []  No new Education completed  [x]  Reviewed Prior HEP      [x]  Patient verbalized and/or demonstrated understanding of education provided. []  Patient unable to verbalize and/or demonstrate understanding of education provided. Will continue education. []  Barriers to learning:     PLAN:  Treatment Recommendations: Strengthening, Range of Motion, Balance Training, Functional Mobility Training, Transfer Training, Gait Training, Stair Training, Neuromuscular Re-education, Manual Therapy - Soft Tissue Mobilization, Pain Management, Home Exercise Program, Patient Education, Safety Education and Training, Integrative Dry Needling and Modalities    []  Plan of care initiated 12/31/2020. Plan to see patient 3 times per week for 12 weeks to address the treatment plan outlined above. [x]  Continue with current plan of care with eventual reduction in frequency to twice per week pending progress.   []  Modify plan of care as follows:    []  Hold pending physician visit  []  Discharge    Time In 1250   Time Out 1340   Timed Code Minutes: 50 min   Total Treatment Time: 50 min       Electronically Signed by: Philip Linares

## 2021-01-22 ENCOUNTER — HOSPITAL ENCOUNTER (OUTPATIENT)
Dept: PHYSICAL THERAPY | Age: 76
Setting detail: THERAPIES SERIES
Discharge: HOME | End: 2021-01-22
Payer: MEDICARE

## 2021-01-22 PROCEDURE — 97110 THERAPEUTIC EXERCISES: CPT

## 2021-01-22 NOTE — PROGRESS NOTES
completed therapy prior to TKA to strengthen the knee. Patient also has history L TKA 4 years ago. SUBJECTIVE:   Patient states that she is using vitamin E oil around the incision. Only using cane when she leaves the home. Continues to work on exercises at home. Feel like knee is stiff but having minimal pain. TREATMENT   Precautions:    Pain:  2/10 Right knee - deep aching pain    X in shaded column indicates activity completed today   Modalities Parameters/  Location  Notes               Manual Therapy Time/Technique  Notes               Exercise/Intervention   Notes   Quad set 15 rep 5 sec  Verbal review   Heelslides 10x      Heelslides with strap 15x 5 seconds  Right knee AROM 108 degrees flexion   SAQ 10x Right 5 seconds  Verbal review   SLR 15x Right   Cues to maintain quad set; therapist min A   Supine Hip Abduction 10 R   Verbal review          Seated march 10B      Seated heelslide 10x Right 5 seconds     Seated LAQ 10x Right 5 seconds     Seated HS curl 10R 5 sec  Green theraband issued previously; NuStep Level 3 6 minutes  X Seat 9, No arms   Standing march on Blue foam 20x Bilateral   X Bilateral UE support    Standing HS curls on Blue foam 15x Bilateral  X Bilateral UE support           TKE       Reverse TKE       Step Ups 6 inch forward/lateral Right closed chain 15x 6 inch step X    Step Taps- bend knee to place R foot on step (such as marching onto step) without hiking R hip 10x Right  X    Eccentric step downs 10x Right  6 inch X    Standing 3-way hip on Blue Foam 15x Bilateral each way  X Bilateral UE support; cues for upright posture   Mini squats on Blue foam 15x  X Cues for equal weight shift   Standing gastroc, hamstring, knee flexion stretches at steps 3x Right 20 seconds X    Gait Training  5 minutes  Using straight cane with cues for R knee flexion during swing phase and heelstrike on initial contact.     Up and Down 4 steps 2 trials  X Patient using non-reciprocal technique. Rocker Board taps and hover 10 each way  X    NK Table quads and hamstrings 2.5# 10x Right  X                           Specific Interventions Next Treatment: Add NK Table next visit. Progress RLE strengthening exercises and step up tasks with emphasis on knee flexion ROM. Activity/Treatment Tolerance:  [x]  Patient tolerated treatment well  []  Patient limited by fatigue  []  Patient limited by pain and swelling []  Patient limited by medical complications  []  Other:     Assessment:  Introduced patient to NK table today with patient tolerating well. Patient ambulated throughout session without assistive device. No increased pain noted at end of session. Challenged by balance activities       Short Term Goals:  Time Frame: 6 weeks    1. Patient will report reduction of R knee pain to less than 2/10 with ability to ascend/descend stairs reciprocally and perform sit<>stand transfers. 2.  Patient will demonstrate increased R knee AROM to 0-120 degrees to improve car transfer and normalize gait pattern. 3.  Patient will demonstrate complete resolution of R knee edema. Long Term Goals:  Time Frame: 12 weeks    1. Patient will demonstrate increased LE strength to greater than or equal to 4+/5 to improve overall standing tolerance and ability to stand to perform ADLs at the sink. 2.  Patient will demonstrate improved function on LEFS with score of >65/80. 3.  Patient will be independent with comprehensive HEP. 4.  Patient will ambulate without antalgia, unlimited community distances without need for AD demonstrating normalized knee mechanics. Patient Education:   []  HEP/Education Completed:      [address] Access Code: [email or web address]  []  No new Education completed  [x]  Reviewed Prior HEP      [x]  Patient verbalized and/or demonstrated understanding of education provided. []  Patient unable to verbalize and/or demonstrate understanding of education provided.   Will continue education. []  Barriers to learning:     PLAN:  Treatment Recommendations: Strengthening, Range of Motion, Balance Training, Functional Mobility Training, Transfer Training, Gait Training, Stair Training, Neuromuscular Re-education, Manual Therapy - Soft Tissue Mobilization, Pain Management, Home Exercise Program, Patient Education, Safety Education and Training, Integrative Dry Needling and Modalities    []  Plan of care initiated 12/31/2020. Plan to see patient 3 times per week for 12 weeks to address the treatment plan outlined above. [x]  Continue with current plan of care with eventual reduction in frequency to twice per week pending progress.   []  Modify plan of care as follows:    []  Hold pending physician visit  []  Discharge    Time In 1300   Time Out 1345   Timed Code Minutes: 45 min   Total Treatment Time: 45 min       Electronically Signed by: Cisco Collazo

## 2021-01-25 ENCOUNTER — HOSPITAL ENCOUNTER (OUTPATIENT)
Dept: PHYSICAL THERAPY | Age: 76
Setting detail: THERAPIES SERIES
Discharge: HOME | End: 2021-01-25
Payer: MEDICARE

## 2021-01-25 PROCEDURE — 97110 THERAPEUTIC EXERCISES: CPT

## 2021-01-25 NOTE — PROGRESS NOTES
[address] [facility]  PHYSICAL THERAPY  [] RE- EVALUATION  [x] DAILY NOTE (LAND) [] DAILY NOTE (AQUATIC ) [] PROGRESS NOTE [] DISCHARGE NOTE    [x] OUTPATIENT REHABILITATION [facility]   [] [place] [address]    [] [facility]   [] Molina Dela Cruz    Date: 2021  Patient Name:  Rafael Nguyễn  : 1945  MRN: [id number]  CSN: [id number]    Referring Practitioner Jenna Wilburn MD   Diagnosis Unilateral primary osteoarthritis, right knee [M17.11]    Treatment Diagnosis R knee pain, R knee stiffness, Difficulty with ambulation, Unsteadiness, Muscle weakness   Date of Evaluation RE-EVAL 2020; EVAL 20    Additional Pertinent History R TKA 2020, L TKA 4 years ago, hypothyroid      Functional Outcome Measure Used LEFS   Functional Outcome Score 46/80 (2021); 25/80 (2020); 19/80 (20)       Insurance: Primary: Payor: Rosena Schirmer /  /  / ,   Secondary:    Authorization Information: Unlimited visits per medical necessity   Visit # 23, 2/10 for next progress note   Visits Allowed: Unlimited   Recertification Date: [id number]   Physician Follow-Up: 2/10/2021   Physician Orders:    History of Present Illness: Patient presents for re-evaluation following R TKA performed 2020 by Dr. Gualberto Ferguson. Patient is now using rolling walker and reports she is doing very well. She had a one night stay at the hospital and was discharged home yesterday morning. Patient is taking 1000mg Tylenol and Tramadol. Patient had a spinal block and received a pain medication injection prior to closure. \"I feel very positive about everything. \" Patient reports sleeping really well in bed last night. Patient is using an ice wrap issued by surgeon. Patient is wearing B ASHLEY hose for 2 weeks. Patient underwent R knee scope on 2020 to clean up the medial meniscus. Patient reports she is bone on bone. Knee arthroscopy was unsuccessful due to extent of OA.  Patient completed therapy prior to TKA to strengthen the knee. Patient also has history L TKA 4 years ago. SUBJECTIVE:   Patient notes a good night's sleep and is decreasing her use of pain medication for symptom management. Patient is compliant with home program 3x a day. TREATMENT   Precautions:    Pain:  2/10 Right knee - deep aching pain    X in shaded column indicates activity completed today   Modalities Parameters/  Location  Notes               Manual Therapy Time/Technique  Notes               Exercise/Intervention   Notes   Quad set 15 rep 5 sec  Verbal review   Heelslides 10x      Heelslides with strap 15x 5 seconds X Right knee AAROM 110 degrees flexion   SAQ 10x Right 5 seconds  Verbal review   SLR 15x Right   Cues to maintain quad set; therapist min A   Supine Hip Abduction 10 R   Verbal review          Seated march 10B      Seated heelslide 10x Right 5 seconds     Seated LAQ 10x Right 5 seconds     Seated HS curl 10R 5 sec  Green theraband issued previously; NuStep Level 4 6 minutes  X Seat 9, No arms   Standing march on Blue foam 20x Bilateral   X Bilateral UE support    Standing HS curls on Blue foam 15x Bilateral  X Bilateral UE support           TKE       Reverse TKE       Step Ups 6 inch forward/lateral Right closed chain 15x 6 inch step X    Step Taps- bend knee to place R foot on step (such as marching onto step) without hiking R hip 10x Right  X    Eccentric step downs 10x Right  6 inch X    Standing 3-way hip on Blue Foam 15x Bilateral each way  X Bilateral UE support; cues for upright posture   Mini squats on Blue foam 15x  X Cues for equal weight shift   Standing gastroc, hamstring, knee flexion stretches at steps 3x Right 20 seconds X    Gait Training  5 minutes  Using straight cane with cues for R knee flexion during swing phase and heelstrike on initial contact.     Up and Down 4 steps 2 trials  X recipricolly    Rocker Board taps and hover 10 each way  X    NK Table quads and hamstrings 2.5# 10x Right  X                           Specific Interventions Next Treatment: Add NK Table next visit. Progress RLE strengthening exercises and step up tasks with emphasis on knee flexion ROM. Activity/Treatment Tolerance:  [x]  Patient tolerated treatment well  []  Patient limited by fatigue  []  Patient limited by pain and swelling []  Patient limited by medical complications  []  Other:     Assessment:  Patient is making steady progress towards all goals at this time noting decreased use of SPC and is decreasing her need for pain medication for symptom management. Patient is able to obtain 110 degrees of knee flexion range of motion and descend stairs reciprocally with compensatory strategies including trunk/hip rotation and increased reliance of UE support. Patient also benefits from cues for decreased weight shift when advancing L LE during swing phase to improve knee flexion during gait cycle. Short Term Goals:  Time Frame: 6 weeks    1. Patient will report reduction of R knee pain to less than 2/10 with ability to ascend/descend stairs reciprocally and perform sit<>stand transfers. 2.  Patient will demonstrate increased R knee AROM to 0-120 degrees to improve car transfer and normalize gait pattern. 3.  Patient will demonstrate complete resolution of R knee edema. Long Term Goals:  Time Frame: 12 weeks    1. Patient will demonstrate increased LE strength to greater than or equal to 4+/5 to improve overall standing tolerance and ability to stand to perform ADLs at the sink. 2.  Patient will demonstrate improved function on LEFS with score of >65/80. 3.  Patient will be independent with comprehensive HEP. 4.  Patient will ambulate without antalgia, unlimited community distances without need for AD demonstrating normalized knee mechanics.        Patient Education:   []  HEP/Education Completed:      Raheel Kinney Access Code: [email or web address]  []  No new Education completed  [x]  Reviewed Prior HEP      [x]  Patient verbalized and/or demonstrated understanding of education provided. []  Patient unable to verbalize and/or demonstrate understanding of education provided. Will continue education. []  Barriers to learning:     PLAN:  Treatment Recommendations: Strengthening, Range of Motion, Balance Training, Functional Mobility Training, Transfer Training, Gait Training, Stair Training, Neuromuscular Re-education, Manual Therapy - Soft Tissue Mobilization, Pain Management, Home Exercise Program, Patient Education, Safety Education and Training, Integrative Dry Needling and Modalities    []  Plan of care initiated 12/31/2020. Plan to see patient 3 times per week for 12 weeks to address the treatment plan outlined above. [x]  Continue with current plan of care with eventual reduction in frequency to twice per week pending progress.   []  Modify plan of care as follows:    []  Hold pending physician visit  []  Discharge    Time In 1302   Time Out 1345   Timed Code Minutes: 43 min   Total Treatment Time: 43 min       Electronically Signed by: Chris Strickland

## 2021-01-27 ENCOUNTER — HOSPITAL ENCOUNTER (OUTPATIENT)
Dept: PHYSICAL THERAPY | Age: 76
Setting detail: THERAPIES SERIES
Discharge: HOME | End: 2021-01-27
Payer: MEDICARE

## 2021-01-27 PROCEDURE — 97110 THERAPEUTIC EXERCISES: CPT

## 2021-01-27 NOTE — PROGRESS NOTES
[address] [facility]  PHYSICAL THERAPY  [] RE- EVALUATION  [x] DAILY NOTE (LAND) [] DAILY NOTE (AQUATIC ) [] PROGRESS NOTE [] DISCHARGE NOTE    [x] OUTPATIENT REHABILITATION [facility]   [] [place] [address]    [] [facility]   [] Yamila Patino    Date: 2021  Patient Name:  Danii Melo  : 1945  MRN: [id number]  CSN: [id number]    Referring Practitioner Maricel Arteaga MD   Diagnosis Unilateral primary osteoarthritis, right knee [M17.11]    Treatment Diagnosis R knee pain, R knee stiffness, Difficulty with ambulation, Unsteadiness, Muscle weakness   Date of Evaluation RE-EVAL 2020; EVAL 20    Additional Pertinent History R TKA 2020, L TKA 4 years ago, hypothyroid      Functional Outcome Measure Used LEFS   Functional Outcome Score 46/80 (2021); 25/80 (2020); 19/80 (20)       Insurance: Primary: Payor: Esther Young /  /  / ,   Secondary:    Authorization Information: Unlimited visits per medical necessity   Visit # 20, 3/10 for next progress note   Visits Allowed: Unlimited   Recertification Date:    Physician Follow-Up: 2/10/2021   Physician Orders:    History of Present Illness: Patient presents for re-evaluation following R TKA performed 2020 by Dr. Sherire Lay. Patient is now using rolling walker and reports she is doing very well. She had a one night stay at the hospital and was discharged home yesterday morning. Patient is taking 1000mg Tylenol and Tramadol. Patient had a spinal block and received a pain medication injection prior to closure. \"I feel very positive about everything. \" Patient reports sleeping really well in bed last night. Patient is using an ice wrap issued by surgeon. Patient is wearing B ASHLEY hose for 2 weeks. Patient underwent R knee scope on 2020 to clean up the medial meniscus. Patient reports she is bone on bone. Knee arthroscopy was unsuccessful due to extent of OA.  Patient completed therapy prior to TKA to strengthen the knee. Patient also has history L TKA 4 years ago. SUBJECTIVE:   Patient notes her pain is manageable at this time and continues to note swelling in the knee that tends to fluctuate with activity. Patient is icing. Patient is rarely using the cane only in the community if she does. TREATMENT   Precautions:    Pain:  0/10 Right knee - deep aching pain    X in shaded column indicates activity completed today   Modalities Parameters/  Location  Notes               Manual Therapy Time/Technique  Notes               Exercise/Intervention   Notes   Quad set 15 rep 5 sec  Verbal review   Heelslides 10x      Heelslides with strap 15x 5 seconds  Right knee AAROM 110 degrees flexion   SAQ 10x Right 5 seconds  Verbal review   SLR 15x Right   Cues to maintain quad set; therapist min A   Supine Hip Abduction 10 R   Verbal review          Seated march 10B      Seated heelslide 10x Right 5 seconds     Seated LAQ 10x Right 5 seconds     Seated HS curl 10R 5 sec  Green theraband issued previously; NuStep Level 4 5 minutes  X Seat 7, No arms   Standing march on Blue foam 20x Bilateral   X Bilateral UE support    Standing HS curls on Blue foam 15x Bilateral  X Bilateral UE support           TKE       Reverse TKE       Step Ups 6 inch forward/lateral Right closed chain 15x 6 inch step X    Step Taps- bend knee to place R foot on step (such as marching onto step) without hiking R hip 10x Right  X    Eccentric step downs 15x Right  6 inch X    Standing 3-way hip on Blue Foam 15x Bilateral each way  X Bilateral UE support; cues for upright posture   Mini squats on Blue foam 15x  X Cues for equal weight shift   Standing gastroc, hamstring, knee flexion stretches at steps 3x Right 25 seconds X    Gait Training  5 minutes  Using straight cane with cues for R knee flexion during swing phase and heelstrike on initial contact.     Up and Down 4 steps 2 trials  X recipricolly Rocker Board taps and hover 10 each way      NK Table quads and hamstrings 2.5# 2x10 R  X           Dynamic Gait: marches, HS curls, retro and sidestepping x1 lap ea  X In back hallway with hand rail support as needed             Specific Interventions Next Treatment: Add NK Table next visit. Progress RLE strengthening exercises and step up tasks with emphasis on knee flexion ROM. Activity/Treatment Tolerance:  [x]  Patient tolerated treatment well  []  Patient limited by fatigue  []  Patient limited by pain and swelling []  Patient limited by medical complications  []  Other:     Assessment:  Patient continues to make steady progress with primary limitation being achieving functional R knee flexion that is impacting patients gait quality, ability to perform sit to stands, and descend stairs with ease. Patient demonstrates good potential at this time and has yet to show signs of clinical plateau. Short Term Goals:  Time Frame: 6 weeks    1. Patient will report reduction of R knee pain to less than 2/10 with ability to ascend/descend stairs reciprocally and perform sit<>stand transfers. 2.  Patient will demonstrate increased R knee AROM to 0-120 degrees to improve car transfer and normalize gait pattern. 3.  Patient will demonstrate complete resolution of R knee edema. Long Term Goals:  Time Frame: 12 weeks    1. Patient will demonstrate increased LE strength to greater than or equal to 4+/5 to improve overall standing tolerance and ability to stand to perform ADLs at the sink. 2.  Patient will demonstrate improved function on LEFS with score of >65/80. 3.  Patient will be independent with comprehensive HEP. 4.  Patient will ambulate without antalgia, unlimited community distances without need for AD demonstrating normalized knee mechanics.        Patient Education:   []  HEP/Education Completed:      Tiera Spears Access Code: [email or web address]  []  No new Education completed  [x]  Reviewed Prior HEP      [x]  Patient verbalized and/or demonstrated understanding of education provided. []  Patient unable to verbalize and/or demonstrate understanding of education provided. Will continue education. []  Barriers to learning:     PLAN:  Treatment Recommendations: Strengthening, Range of Motion, Balance Training, Functional Mobility Training, Transfer Training, Gait Training, Stair Training, Neuromuscular Re-education, Manual Therapy - Soft Tissue Mobilization, Pain Management, Home Exercise Program, Patient Education, Safety Education and Training, Integrative Dry Needling and Modalities    []  Plan of care initiated 12/31/2020. Plan to see patient 3 times per week for 12 weeks to address the treatment plan outlined above. [x]  Continue with current plan of care with eventual reduction in frequency to twice per week pending progress.   []  Modify plan of care as follows:    []  Hold pending physician visit  []  Discharge    Time In 1254   Time Out 1339   Timed Code Minutes: 45 min   Total Treatment Time: 45 min       Electronically Signed by: Zbigniew Steel

## 2021-02-01 ENCOUNTER — HOSPITAL ENCOUNTER (OUTPATIENT)
Dept: PHYSICAL THERAPY | Age: 76
Setting detail: THERAPIES SERIES
Discharge: HOME | End: 2021-02-01
Payer: MEDICARE

## 2021-02-01 PROCEDURE — 97110 THERAPEUTIC EXERCISES: CPT

## 2021-02-01 NOTE — PROGRESS NOTES
[address] [facility]  PHYSICAL THERAPY  [] RE- EVALUATION  [x] DAILY NOTE (LAND) [] DAILY NOTE (AQUATIC ) [] PROGRESS NOTE [] DISCHARGE NOTE    [x] OUTPATIENT REHABILITATION [facility]   [] [place] [address]    [] [facility]   [] Darryl Jaeger    Date: 2021  Patient Name:  Teresa Alcantara  : 1945  MRN: [id number]  CSN: [id number]    Referring Practitioner Marycarmen Mari MD   Diagnosis Unilateral primary osteoarthritis, right knee [M17.11]    Treatment Diagnosis R knee pain, R knee stiffness, Difficulty with ambulation, Unsteadiness, Muscle weakness   Date of Evaluation RE-EVAL 2020; EVAL 20    Additional Pertinent History R TKA 2020, L TKA 4 years ago, hypothyroid      Functional Outcome Measure Used LEFS   Functional Outcome Score 46/80 (2021); 25/80 (2020); 19/80 (20)       Insurance: Primary: Payor: Jasper Ventura /  /  / ,   Secondary:    Authorization Information: Unlimited visits per medical necessity   Visit # 21, 4/10 for next progress note   Visits Allowed: Unlimited   Recertification Date:    Physician Follow-Up: 2/10/2021   Physician Orders:    History of Present Illness: Patient presents for re-evaluation following R TKA performed 2020 by Dr. Matt Rosado. Patient is now using rolling walker and reports she is doing very well. She had a one night stay at the hospital and was discharged home yesterday morning. Patient is taking 1000mg Tylenol and Tramadol. Patient had a spinal block and received a pain medication injection prior to closure. \"I feel very positive about everything. \" Patient reports sleeping really well in bed last night. Patient is using an ice wrap issued by surgeon. Patient is wearing B ASHLEY hose for 2 weeks. Patient underwent R knee scope on 2020 to clean up the medial meniscus. Patient reports she is bone on bone. Knee arthroscopy was unsuccessful due to extent of OA.  Patient completed therapy prior to TKA to strengthen the knee. Patient also has history L TKA 4 years ago. SUBJECTIVE:   Patient c/o of slight stiffness and pain in R knee and still noting swelling. Pt reports she is able to perform standing tasks around the house with less pain. Pt is feeling eager to get back to driving. TREATMENT   Precautions:    Pain:  1/10 Right knee - deep aching pain    X in shaded column indicates activity completed today   Modalities Parameters/  Location  Notes               Manual Therapy Time/Technique  Notes               Exercise/Intervention   Notes   Quad set 15 rep 5 sec  Verbal review   Heelslides 10x      Heelslides with strap 15x 5 seconds x Right knee AAROM 113 degrees flexion   SAQ 10x Right 5 seconds  Verbal review   SLR 15x Right   Cues to maintain quad set; therapist min A   Supine Hip Abduction 10 R   Verbal review          Seated march 10B      Seated heelslide 10x Right 5 seconds     Seated LAQ 10x Right 5 seconds     Seated HS curl 10R 5 sec  Green theraband issued previously; NuStep Level 4 6 minutes  X Seat 8, No arms   Standing march on Blue foam 20x Bilateral   X Bilateral UE support    Standing HS curls on Blue foam 15x Bilateral  X Bilateral UE support           TKE       Reverse TKE       Step Ups 6 inch forward/lateral Right closed chain 15x 6 inch step x    Step Taps- bend knee to place R foot on step (such as marching onto step) without hiking R hip 10x Right      Eccentric step downs 15x Right  6 inch     Standing 3-way hip on Blue Foam 15x Bilateral each way  X Bilateral UE support; cues for upright posture and technique   Mini squats on Blue foam 15x  X Cues for equal weight shift   Standing gastroc, hamstring, knee flexion stretches at steps 3x Right 25 seconds X    Gait Training  5 minutes  Using straight cane with cues for R knee flexion during swing phase and heelstrike on initial contact.     Up and Down 4 steps 3x  x Recipricbrian, B UE support   Rocker Board taps and hover 10 each way      NK Table quads and hamstrings 2.5# 2x10 R  X 5# for 2nd set of quads & both sets of HS           Dynamic Gait: marches, HS curls, retro and sidestepping x1 lap ea  X In back hallway with hand rail support as needed             Specific Interventions Next Treatment: progress patient as tolerated   Activity/Treatment Tolerance:  [x]  Patient tolerated treatment well  []  Patient limited by fatigue  []  Patient limited by pain and swelling []  Patient limited by medical complications  []  Other:     Assessment:  Patient is very motivated to improve. Progressed weights on NK table per patient request d/t 2.5 # feeling too easy. AAROM increased to 113 degrees this session. Pt reported less stiffness at the conclusion of session. Short Term Goals:  Time Frame: 6 weeks    1. Patient will report reduction of R knee pain to less than 2/10 with ability to ascend/descend stairs reciprocally and perform sit<>stand transfers. 2.  Patient will demonstrate increased R knee AROM to 0-120 degrees to improve car transfer and normalize gait pattern. 3.  Patient will demonstrate complete resolution of R knee edema. Long Term Goals:  Time Frame: 12 weeks    1. Patient will demonstrate increased LE strength to greater than or equal to 4+/5 to improve overall standing tolerance and ability to stand to perform ADLs at the sink. 2.  Patient will demonstrate improved function on LEFS with score of >65/80. 3.  Patient will be independent with comprehensive HEP. 4.  Patient will ambulate without antalgia, unlimited community distances without need for AD demonstrating normalized knee mechanics. Patient Education:   []  HEP/Education Completed:      Brad Dela Cruz Access Code: [email or web address]  [x]  No new Education completed  []  Reviewed Prior HEP      []  Patient verbalized and/or demonstrated understanding of education provided.   []  Patient unable to verbalize and/or demonstrate understanding of education provided. Will continue education. []  Barriers to learning:     PLAN:  Treatment Recommendations: Strengthening, Range of Motion, Balance Training, Functional Mobility Training, Transfer Training, Gait Training, Stair Training, Neuromuscular Re-education, Manual Therapy - Soft Tissue Mobilization, Pain Management, Home Exercise Program, Patient Education, Safety Education and Training, Integrative Dry Needling and Modalities    []  Plan of care initiated 12/31/2020. Plan to see patient 3 times per week for 12 weeks to address the treatment plan outlined above. [x]  Continue with current plan of care with eventual reduction in frequency to twice per week pending progress. []  Modify plan of care as follows:    []  Hold pending physician visit  []  Discharge    Time In 1348   Time Out 1429   Timed Code Minutes: 41 min   Total Treatment Time: 41 min       Electronically Signed by: Cody ORTIZ    Patient was treated by student PTA under the direct supervision of a licensed PTA, Cristin Santiago PTA [id number]

## 2021-02-03 ENCOUNTER — HOSPITAL ENCOUNTER (OUTPATIENT)
Dept: PHYSICAL THERAPY | Age: 76
Setting detail: THERAPIES SERIES
Discharge: HOME | End: 2021-02-03
Payer: MEDICARE

## 2021-02-03 PROCEDURE — 97110 THERAPEUTIC EXERCISES: CPT

## 2021-02-03 NOTE — PROGRESS NOTES
[address] [facility]  PHYSICAL THERAPY  [] RE- EVALUATION  [x] DAILY NOTE (LAND) [] DAILY NOTE (AQUATIC ) [] PROGRESS NOTE [] DISCHARGE NOTE    [x] OUTPATIENT REHABILITATION [facility]   [] [place] [address]    [] [facility]   [] Clista Rinne    Date: 2/3/2021  Patient Name:  Corbin Salter  : 1945  MRN: [id number]  CSN: [id number]    Referring Practitioner Jovanny Milton MD   Diagnosis Unilateral primary osteoarthritis, right knee [M17.11]    Treatment Diagnosis R knee pain, R knee stiffness, Difficulty with ambulation, Unsteadiness, Muscle weakness   Date of Evaluation RE-EVAL 2020; EVAL 20    Additional Pertinent History R TKA 2020, L TKA 4 years ago, hypothyroid      Functional Outcome Measure Used LEFS   Functional Outcome Score 46/80 (2021); 25/80 (2020); 19/80 (20)       Insurance: Primary: Payor: Pedro Tenorio /  /  / ,   Secondary:    Authorization Information: Unlimited visits per medical necessity   Visit # 25, 5/10 for next progress note   Visits Allowed: Unlimited   Recertification Date:    Physician Follow-Up: 2/10/2021   Physician Orders:    History of Present Illness: Patient presents for re-evaluation following R TKA performed 2020 by Dr. Nancy Howell. Patient is now using rolling walker and reports she is doing very well. She had a one night stay at the hospital and was discharged home yesterday morning. Patient is taking 1000mg Tylenol and Tramadol. Patient had a spinal block and received a pain medication injection prior to closure. \"I feel very positive about everything. \" Patient reports sleeping really well in bed last night. Patient is using an ice wrap issued by surgeon. Patient is wearing B ASHLEY hose for 2 weeks. Patient underwent R knee scope on 2020 to clean up the medial meniscus. Patient reports she is bone on bone. Knee arthroscopy was unsuccessful due to extent of OA.  Patient completed therapy prior to TKA to strengthen the knee. Patient also has history L TKA 4 years ago. SUBJECTIVE:   Patient reports she is doing well and requests to decrease treatment frequency to two times per week rather than three and be discharged next week as things are going well and patient's spouse has some upcoming medical procedures to manage. Patient reports R knee is pain free and she started driving yesterday without incident. Patient brought cane to session only because of fear of encountering ice on sidewalk. TREATMENT   Precautions:    Pain:  0/10 Right knee     X in shaded column indicates activity completed today   Modalities Parameters/  Location  Notes               Manual Therapy Time/Technique  Notes               Exercise/Intervention   Notes   Quad set 15 rep 5 sec  Verbal review   Heelslides 10x      Heelslides with strap 15x 5 seconds x Right knee AAROM 113 degrees flexion   SAQ 10x Right 5 seconds  Verbal review   SLR 15x Right   Cues to maintain quad set; therapist min A   Supine Hip Abduction 10 R   Verbal review          Seated march 10B      Seated heelslide 10x Right 5 seconds     Seated LAQ 10x Right 5 seconds     Seated HS curl 10R 5 sec  Green theraband issued previously;            NuStep Level 5 6 minutes  X Seat 8, No arms   Standing march on Blue foam 20x Bilateral   X Bilateral UE support    Standing HS curls on Blue foam 20x Bilateral  X Bilateral UE support           TKE       Reverse TKE       Step Ups 6 inch forward/lateral Right closed chain 15x 6 inch step x    Step Taps- bend knee to place R foot on step (such as marching onto step) without hiking R hip 10x Right      Eccentric step downs 15x Right  6 inch X    Standing 3-way hip on Blue Foam 15x Bilateral each way  X Bilateral UE support; cues for upright posture and technique   Mini squats on Blue foam 15x  X Cues for equal weight shift   Standing gastroc, hamstring, knee flexion stretches at steps 3x Right 25 seconds X    Gait Training  5 minutes  Using straight cane with cues for R knee flexion during swing phase and heelstrike on initial contact. Up and Down 4 steps 3x   Recipricolly, B UE support   Rocker Board taps and hover 10 each way  X    NK Table quads and hamstrings  2x10 R  X 5# for both   [employer] fwd, retro, sidestepping 5 each way  X    Dynamic Gait: marches, HS curls, retro and sidestepping x1 lap ea  X In back hallway with hand rail support as needed             Specific Interventions Next Treatment: progress patient as tolerated   Activity/Treatment Tolerance:  [x]  Patient tolerated treatment well  []  Patient limited by fatigue  []  Patient limited by pain and swelling []  Patient limited by medical complications  []  Other:     Assessment:  Patient is doing very well functionally, now using cane only for ambulation outdoors when concerned for inclement weather. Patient reports sit<>stand from recliner is getting easier at home and she is nearly able to perform without UE support now. Improved eccentric control noted with stair descent on RLE. Patient demonstrates improvements in balance as well and no longer has to stop and steady herself before taking steps. Right knee flexion continues to steadily improve. Addition of cable column for resisted walking was well tolerated today with no increase in pain reported. Short Term Goals:  Time Frame: 6 weeks    1. Patient will report reduction of R knee pain to less than 2/10 with ability to ascend/descend stairs reciprocally and perform sit<>stand transfers. 2.  Patient will demonstrate increased R knee AROM to 0-120 degrees to improve car transfer and normalize gait pattern. 3.  Patient will demonstrate complete resolution of R knee edema. Long Term Goals:  Time Frame: 12 weeks    1.    Patient will demonstrate increased LE strength to greater than or equal to 4+/5 to improve overall standing tolerance and ability to stand to

## 2021-02-04 ENCOUNTER — APPOINTMENT (OUTPATIENT)
Dept: PHYSICAL THERAPY | Age: 76
End: 2021-02-04
Payer: MEDICARE

## 2021-02-08 ENCOUNTER — HOSPITAL ENCOUNTER (OUTPATIENT)
Dept: PHYSICAL THERAPY | Age: 76
Setting detail: THERAPIES SERIES
Discharge: HOME | End: 2021-02-08
Payer: MEDICARE

## 2021-02-08 PROCEDURE — 97110 THERAPEUTIC EXERCISES: CPT

## 2021-02-08 NOTE — PROGRESS NOTES
[facility]  PHYSICAL THERAPY  [] RE- EVALUATION  [x] DAILY NOTE (LAND) [] DAILY NOTE (AQUATIC ) [] PROGRESS NOTE [] DISCHARGE NOTE    [x] OUTPATIENT REHABILITATION CENTER [facility]   [] [place] [address]    [] [facility]   [] Sym[facility]    Date: 2021  Patient Name:  Naldo Hopper  : 1945  MRN: [id number]  CSN: [id number]    Referring Practitioner Devaughn Collins MD   Diagnosis Unilateral primary osteoarthritis, right knee [M17.11]    Treatment Diagnosis R knee pain, R knee stiffness, Difficulty with ambulation, Unsteadiness, Muscle weakness   Date of Evaluation RE-EVAL 2020; EVAL 20    Additional Pertinent History R TKA 2020, L TKA 4 years ago, hypothyroid      Functional Outcome Measure Used LEFS   Functional Outcome Score 46/80 (2021); 25/80 (2020); 19/80 (20)       Insurance: Primary: Payor: Cele Jakub /  /  / ,   Secondary:    Authorization Information: Unlimited visits per medical necessity   Visit # 23, 6/10 for next progress note   Visits Allowed: Unlimited   Recertification Date: [id number]   Physician Follow-Up: 2/10/2021   Physician Orders:    History of Present Illness: Patient presents for re-evaluation following R TKA performed 2020 by Dr. Gaviota Man. Patient is now using rolling walker and reports she is doing very well. She had a one night stay at the hospital and was discharged home yesterday morning. Patient is taking 1000mg Tylenol and Tramadol. Patient had a spinal block and received a pain medication injection prior to closure. \"I feel very positive about everything. \" Patient reports sleeping really well in bed last night. Patient is using an ice wrap issued by surgeon. Patient is wearing B ASHLEY hose for 2 weeks. Patient underwent R knee scope on 2020 to clean up the medial meniscus. Patient reports she is bone on bone. Knee arthroscopy was unsuccessful due to extent of OA.  Patient completed therapy prior to TKA to strengthen the knee. Patient also has history L TKA 4 years ago. SUBJECTIVE:   Patient notes she is pleased with her progress thus far. Patient notes her greatest challenge is descending stairs and performing sit to stands from a strengthening. TREATMENT   Precautions:    Pain:  0/10 Right knee     X in shaded column indicates activity completed today   Modalities Parameters/  Location  Notes               Manual Therapy Time/Technique  Notes               Exercise/Intervention   Notes   Quad set 15 rep 5 sec  Verbal review   Heelslides 10x      Heelslides with strap 15x 5 seconds  Right knee AAROM 113 degrees flexion   SAQ 10x Right 5 seconds  Verbal review   SLR 15x Right   Cues to maintain quad set; therapist min A   Supine Hip Abduction 10 R   Verbal review          Seated march 10B      Seated heelslide 10x Right 5 seconds     Seated LAQ 10x Right 5 seconds     Seated HS curl 10R 5 sec  Green theraband issued previously; NuStep Level 5 6 minutes  X Seat 8, No arms   Standing march on Blue foam 20x Bilateral   X Bilateral UE support    Standing HS curls on Blue foam 20x Bilateral  X Bilateral UE support           TKE       Reverse TKE       Step Ups 6 inch forward/lateral Right closed chain 15x 6 inch step x    Step Taps- bend knee to place R foot on step (such as marching onto step) without hiking R hip 10x Right  X    Eccentric step downs 15x Right  6 inch X    Standing 3-way hip on Blue Foam 15x Bilateral each way   Bilateral UE support; cues for upright posture and technique   Mini squats on Blue foam 15x  X Cues for equal weight shift   Standing gastroc, hamstring, knee flexion stretches at steps 3x Right 25 seconds X    Gait Training  5 minutes  Using straight cane with cues for R knee flexion during swing phase and heelstrike on initial contact.     Up and Down 4 steps 3x   Recipricolly, B UE support   Rocker Board taps and hover 10 each way  X    NK Table quads and hamstrings  2x10 B  X 5# for both   Cable Column Walking fwd, retro, sidestepping 5 each way 15# X    Dynamic Gait: marches, HS curls, retro and sidestepping x1 lap ea  X In back hallway with hand rail support as needed    TG squats 2x10  X      Specific Interventions Next Treatment: progress patient as tolerated   Activity/Treatment Tolerance:  [x]  Patient tolerated treatment well  []  Patient limited by fatigue  []  Patient limited by pain and swelling []  Patient limited by medical complications  []  Other:     Assessment:  Patient is making steady progress towards all goals at this time and demonstrates good potential for further gains. Patient has yet to show signs of clinical plateau as she is challenged with current treatment from a strengthening standpoint and achieving functional knee flexion mobility. Patient sees MD on Wednesday and voices she may want her next visit to be her last depending on her follow up appointment with the MD.       Short Term Goals:  Time Frame: 6 weeks    1. Patient will report reduction of R knee pain to less than 2/10 with ability to ascend/descend stairs reciprocally and perform sit<>stand transfers. 2.  Patient will demonstrate increased R knee AROM to 0-120 degrees to improve car transfer and normalize gait pattern. 3.  Patient will demonstrate complete resolution of R knee edema. Long Term Goals:  Time Frame: 12 weeks    1. Patient will demonstrate increased LE strength to greater than or equal to 4+/5 to improve overall standing tolerance and ability to stand to perform ADLs at the sink. 2.  Patient will demonstrate improved function on LEFS with score of >65/80. 3.  Patient will be independent with comprehensive HEP. 4.  Patient will ambulate without antalgia, unlimited community distances without need for AD demonstrating normalized knee mechanics.        Patient Education:   []  HEP/Education Completed:      [address] Access Code: [email or web address]  [x]  No new Education completed  []  Reviewed Prior HEP      []  Patient verbalized and/or demonstrated understanding of education provided. []  Patient unable to verbalize and/or demonstrate understanding of education provided. Will continue education. []  Barriers to learning:     PLAN:  Treatment Recommendations: Strengthening, Range of Motion, Balance Training, Functional Mobility Training, Transfer Training, Gait Training, Stair Training, Neuromuscular Re-education, Manual Therapy - Soft Tissue Mobilization, Pain Management, Home Exercise Program, Patient Education, Safety Education and Training, Integrative Dry Needling and Modalities    []  Plan of care initiated 12/31/2020. Plan to see patient 3 times per week for 12 weeks to address the treatment plan outlined above. [x]  Continue with current plan of care with reduction in frequency to twice per week starting today.   []  Modify plan of care as follows:    []  Hold pending physician visit  []  Discharge    Time In 1300   Time Out 1345   Timed Code Minutes: 45 min   Total Treatment Time: 45 min       Electronically Signed by: Jeferson Oconnell

## 2021-02-10 ENCOUNTER — APPOINTMENT (OUTPATIENT)
Dept: PHYSICAL THERAPY | Age: 76
End: 2021-02-10
Payer: MEDICARE

## 2021-02-11 ENCOUNTER — HOSPITAL ENCOUNTER (OUTPATIENT)
Dept: PHYSICAL THERAPY | Age: 76
Setting detail: THERAPIES SERIES
Discharge: HOME | End: 2021-02-11
Payer: MEDICARE

## 2021-02-11 PROCEDURE — 97110 THERAPEUTIC EXERCISES: CPT

## 2021-02-11 NOTE — DISCHARGE SUMMARY
X    Standing 3-way hip on Blue Foam 15x Bilateral each way  X Bilateral UE support; cues for upright posture   Mini squats on Blue foam 15x  X Cues for equal weight shift   Standing gastroc, hamstring, knee flexion stretches at steps 3x Right 20 seconds X    Gait Training  5 minutes  Using straight cane with cues for R knee flexion during swing phase and heelstrike on initial contact. Up and Down 4 steps 2 trials  X Patient using non-reciprocal technique. Rocker Board taps and hover 10 each way  X    NK Table quads and hamstrings         Specific Interventions Next Treatment: Add NK Table next visit. Progress RLE strengthening exercises and step up tasks with emphasis on knee flexion ROM. Activity/Treatment Tolerance:  [x]  Patient tolerated treatment well  []  Patient limited by fatigue  []  Patient limited by pain and swelling []  Patient limited by medical complications  []  Other:     Assessment:  Patient has made great gains with regards to R knee pain control, edema reduction, and R knee AROM continues to demonstrate improvement. Patient has improved functional strength in RLE for activities such as stair climbing and transfers. Patient continues to report some difficulty with sit>stand from low, soft surface and relies on UE support for that activity. See goal status below. Patient demonstrates good understanding of HEP and exercise recall. Short Term Goals:  Time Frame: 6 weeks    1. Patient will report reduction of R knee pain to less than 2/10 with ability to ascend/descend stairs reciprocally and perform sit<>stand transfers. [x] Goal Met [] Goal Not Met [] Continue Goal [x] Discontinue Goal  [] Revise Goal  Goal Assessment:  Patient reports that she never experiences more than 2/10 pain even with activity.      2.  Patient will demonstrate increased R knee AROM to 0-120 degrees to improve car transfer and normalize gait pattern.  [] Goal Met [] Goal Not Met [] Continue Goal [] Discontinue Goal  [] Revise Goal  Goal Assessment: R knee AROM: 0-110 deg; AAROM flexion= 115 deg with heelslide with overpressure     3. Patient will demonstrate complete resolution of R knee edema. [x] Goal Met [] Goal Not Met [] Continue Goal [x] Discontinue Goal  [] Revise Goal  Goal Assessment: R knee swelling has resolved and no longer impacts ROM. Long Term Goals:  Time Frame: 12 weeks    1. Patient will demonstrate increased LE strength to greater than or equal to 4+/5 to improve overall standing tolerance and ability to stand to perform ADLs at the sink. [x] Goal Met [] Goal Not Met [] Continue Goal [x] Discontinue Goal  [] Revise Goal  Goal Assessment: R hamstrings 4+/5, R quad 4+/5, R hip flexion 4+/5, R hip abduction 4/5    2. Patient will demonstrate improved function on LEFS with score of >65/80. [] Goal Met [x] Goal Not Met [] Continue Goal [] Discontinue Goal  [] Revise Goal  Goal Assessment:  Score today 62/80 compared to 25/80 at evaluation post op. 3.  Patient will be independent with comprehensive HEP. [x] Goal Met [] Goal Not Met [] Continue Goal [x] Discontinue Goal  [] Revise Goal  Goal Assessment: Patient remains compliant with exercises daily. Demonstrates good understanding of program.    4.  Patient will ambulate without antalgia, unlimited community distances without need for AD demonstrating normalized knee mechanics. [x] Goal Met [] Goal Not Met [] Continue Goal [x] Discontinue Goal  [] Revise Goal  Goal Assessment: Patient is no longer using AD for ambulation within the home or community and gait is no longer antalgic. Distances are no longer limited by pain. Right knee flexion ROM allows adequate foot clearance without hip hiking or circumduction.          Patient Education:   []  HEP/Education Completed:      [address] Access Code: [email or web address]  []  No new Education completed  [x]  Reviewed Prior HEP      [x]  Patient verbalized and/or demonstrated understanding of education provided. []  Patient unable to verbalize and/or demonstrate understanding of education provided. Will continue education. []  Barriers to learning:     PLAN:  Treatment Recommendations: Strengthening, Range of Motion, Balance Training, Functional Mobility Training, Transfer Training, Gait Training, Stair Training, Neuromuscular Re-education, Manual Therapy - Soft Tissue Mobilization, Pain Management, Home Exercise Program, Patient Education, Safety Education and Training, Integrative Dry Needling and Modalities    []  Plan of care initiated 12/31/2020. Plan to see patient 3 times per week for 12 weeks to address the treatment plan outlined above. []  Continue with current plan of care with eventual reduction in frequency to twice per week pending progress.   []  Modify plan of care as follows:    []  Hold pending physician visit  [x]  Discharge    Time In 1302   Time Out 1346   Timed Code Minutes: 44 min   Total Treatment Time: 44 min       Electronically Signed by: Shaniqua Ma

## 2021-02-15 RX ORDER — CELECOXIB 100 MG/1
100 CAPSULE ORAL 2 TIMES DAILY
Qty: 180 CAPSULE | Refills: 3 | Status: SHIPPED | OUTPATIENT
Start: 2021-02-15 | End: 2021-08-19

## 2021-03-04 ENCOUNTER — VIRTUAL VISIT (OUTPATIENT)
Dept: FAMILY MEDICINE CLINIC | Age: 76
End: 2021-03-04
Payer: MEDICARE

## 2021-03-04 ENCOUNTER — TELEPHONE (OUTPATIENT)
Dept: FAMILY MEDICINE CLINIC | Age: 76
End: 2021-03-04

## 2021-03-04 DIAGNOSIS — J32.9 CHRONIC SINUSITIS, UNSPECIFIED LOCATION: ICD-10-CM

## 2021-03-04 DIAGNOSIS — J01.90 ACUTE RHINOSINUSITIS: Primary | ICD-10-CM

## 2021-03-04 PROCEDURE — 99442 PR PHYS/QHP TELEPHONE EVALUATION 11-20 MIN: CPT | Performed by: FAMILY MEDICINE

## 2021-03-04 RX ORDER — AMOXICILLIN AND CLAVULANATE POTASSIUM 875; 125 MG/1; MG/1
1 TABLET, FILM COATED ORAL 2 TIMES DAILY
Qty: 42 TABLET | Refills: 0 | Status: SHIPPED | OUTPATIENT
Start: 2021-03-04 | End: 2021-03-25

## 2021-03-04 NOTE — PROGRESS NOTES
Eva Orellana (:  1945) is a 77 y.o. female,Established patient, here for evaluation of the following chief complaint(s): Sinusitis  Colleen Acevedo is a 77 y.o. female evaluated via telephone on 3/4/2021. Consent:  She and/or health care decision maker is aware that that she may receive a bill for this telephone service, depending on her insurance coverage, and has provided verbal consent to proceed: Yes      Documentation:  I communicated with the patient and/or health care decision maker about sinus infection. Details of this discussion including any medical advice provided: see A/P      I affirm this is a Patient Initiated Episode with a Patient who has not had a related appointment within my department in the past 7 days or scheduled within the next 24 hours. Patient identification was verified at the start of the visit: Yes    Total Time: minutes: 11-20 minutes    The visit was conducted pursuant to the emergency declaration under the [address] [place] [address] authority and the [employer] General Act. Patient identification was verified, and a caregiver was present when appropriate. The patient was located in a state where the provider was credentialed to provide care. Note: not billable if this call serves to triage the patient into an appointment for the relevant concern      Wilene Ganser:  HPI:    Chief Complaint   Patient presents with    Sinusitis     Pt presents today for ongoing sinus pressure, congestion for the last few months. She was prescribed an abx and felt good while on it but symptoms returned shortly after. Taking multiple OTC's with no relief. She follows Dr. Angeles Kim who told her that she needs surgery but she is not willing at this time.     Patient Active Problem List   Diagnosis    Hypothyroid    Environmental allergies    Hyperlipidemia with target LDL less than 100    Post menopausal problems    Claustrophobia    Difficulty waking    Morbid obesity with BMI of 40.0-44.9, adult (HCC)    ARTIS on CPAP    Insomnia     Past Surgical History:   Procedure Laterality Date    COLONOSCOPY  2007    [facility]    EYE SURGERY Right 02/28/2019    Cataract Surgery with Dr. Fidela Dakins Left 03/13/2019    Cataract Surgery with Dr. Tony Councilman Left 2016    Dr Richard Ching Left 1/8/2018    COLONOSCOPY SCREENING performed by Claudette Dys, DO at [facility]         Review of Systems   Constitutional: Negative. Negative for fever. HENT: Positive for congestion, postnasal drip, rhinorrhea, sinus pressure and sinus pain. Respiratory: Positive for cough. Cardiovascular: Negative. Gastrointestinal: Negative. Musculoskeletal: Negative. All other systems reviewed and are negative. Patient-Reported Vitals 7/15/2020   Patient-Reported Weight . Patient-Reported Height -   Patient-Reported Systolic -   Patient-Reported Diastolic -   Patient-Reported Pulse -   Patient-Reported Temperature -        Physical Exam  Constitutional:       General: She is not in acute distress. Pulmonary:      Effort: Pulmonary effort is normal. No respiratory distress. Neurological:      Mental Status: She is oriented to person, place, and time. Mental status is at baseline. Psychiatric:         Mood and Affect: Mood normal.         Behavior: Behavior normal.         Thought Content: Thought content normal.         Judgment: Judgment normal.       ASSESSMENT/PLAN:  1. Acute rhinosinusitis  -     amoxicillin-clavulanate (AUGMENTIN) 875-125 MG per tablet; Take 1 tablet by mouth 2 times daily for 21 days, Disp-42 tablet, R-0Normal  2.  Chronic sinusitis, unspecified location    -  Will treat for 3 weeks  -  Continue OTC decongestants  -  Recommend eval by Dr. Azeb Silverio if still no improvement after the paulo            On this date 3/4/2021 I have spent 11-20 minutes reviewing previous notes, test results and face to face (virtual) with the patient discussing the diagnosis and importance of compliance with the treatment plan as well as documenting on the day of the visit. Suze Seen, was evaluated through a synchronous (real-time) audio-video encounter. The patient (or guardian if applicable) is aware that this is a billable service. Verbal consent to proceed has been obtained within the past 12 months. The visit was conducted pursuant to the emergency declaration under the [address] [place] [address], [address] [place] [address] authority and the [employer] General Act. Patient identification was verified, and a caregiver was present when appropriate. The patient was located in a state where the provider was credentialed to provide care. An electronic signature was used to authenticate this note.     --Abimbola Mason, DO

## 2021-03-05 ASSESSMENT — ENCOUNTER SYMPTOMS
GASTROINTESTINAL NEGATIVE: 1
SINUS PRESSURE: 1
SINUS PAIN: 1
RHINORRHEA: 1
COUGH: 1

## 2021-03-10 ENCOUNTER — PATIENT MESSAGE (OUTPATIENT)
Dept: FAMILY MEDICINE CLINIC | Age: 76
End: 2021-03-10

## 2021-03-10 RX ORDER — PREDNISONE 20 MG/1
20 TABLET ORAL 2 TIMES DAILY
Qty: 10 TABLET | Refills: 0 | Status: SHIPPED | OUTPATIENT
Start: 2021-03-10 | End: 2021-03-15

## 2021-03-10 NOTE — TELEPHONE ENCOUNTER
From: Peter Orellana  To: Jayleen Sesay DO  Sent: 3/10/2021 10:49 AM EST  Subject: Prescription Question    Prem had been in hospital since Sat with high BP and breathing issues . He is struggling to breath right now. I started the antibiotic for sinus issue last Thursday---but just dragging as I am here with him most of the time. I am just staying on the edge of feeling really bad---any chance I could get some prednisone ?

## 2021-05-03 ENCOUNTER — NURSE ONLY (OUTPATIENT)
Dept: LAB | Age: 76
End: 2021-05-03

## 2021-05-04 LAB
ALBUMIN SERPL-MCNC: 4.3 G/DL (ref 3.5–5.1)
ALP BLD-CCNC: 71 U/L (ref 38–126)
ALT SERPL-CCNC: 12 U/L (ref 11–66)
ANION GAP SERPL CALCULATED.3IONS-SCNC: 18 MEQ/L (ref 8–16)
AST SERPL-CCNC: 20 U/L (ref 5–40)
BASOPHILS # BLD: 0.9 %
BASOPHILS ABSOLUTE: 0.1 THOU/MM3 (ref 0–0.1)
BILIRUB SERPL-MCNC: 0.2 MG/DL (ref 0.3–1.2)
BUN BLDV-MCNC: 19 MG/DL (ref 7–22)
CALCIUM SERPL-MCNC: 9.9 MG/DL (ref 8.5–10.5)
CHLORIDE BLD-SCNC: 103 MEQ/L (ref 98–111)
CO2: 23 MEQ/L (ref 23–33)
CREAT SERPL-MCNC: 0.6 MG/DL (ref 0.4–1.2)
EOSINOPHIL # BLD: 1.3 %
EOSINOPHILS ABSOLUTE: 0.1 THOU/MM3 (ref 0–0.4)
ERYTHROCYTE [DISTWIDTH] IN BLOOD BY AUTOMATED COUNT: 12.9 % (ref 11.5–14.5)
ERYTHROCYTE [DISTWIDTH] IN BLOOD BY AUTOMATED COUNT: 46.3 FL (ref 35–45)
FOLATE: 13.4 NG/ML (ref 4.8–24.2)
GFR SERPL CREATININE-BSD FRML MDRD: > 90 ML/MIN/1.73M2
GLUCOSE BLD-MCNC: 111 MG/DL (ref 70–108)
HCT VFR BLD CALC: 46.2 % (ref 37–47)
HEMOGLOBIN: 14.4 GM/DL (ref 12–16)
IMMATURE GRANS (ABS): 0.03 THOU/MM3 (ref 0–0.07)
IMMATURE GRANULOCYTES: 0.3 %
LYMPHOCYTES # BLD: 19 %
LYMPHOCYTES ABSOLUTE: 2 THOU/MM3 (ref 1–4.8)
MCH RBC QN AUTO: 30.7 PG (ref 26–33)
MCHC RBC AUTO-ENTMCNC: 31.2 GM/DL (ref 32.2–35.5)
MCV RBC AUTO: 98.5 FL (ref 81–99)
MONOCYTES # BLD: 6.9 %
MONOCYTES ABSOLUTE: 0.7 THOU/MM3 (ref 0.4–1.3)
NUCLEATED RED BLOOD CELLS: 0 /100 WBC
PLATELET # BLD: 238 THOU/MM3 (ref 130–400)
PMV BLD AUTO: 11.4 FL (ref 9.4–12.4)
POTASSIUM SERPL-SCNC: 3.8 MEQ/L (ref 3.5–5.2)
RBC # BLD: 4.69 MILL/MM3 (ref 4.2–5.4)
RHEUMATOID FACTOR: < 10 IU/ML (ref 0–13)
SEDIMENTATION RATE, ERYTHROCYTE: 42 MM/HR (ref 0–20)
SEG NEUTROPHILS: 71.6 %
SEGMENTED NEUTROPHILS ABSOLUTE COUNT: 7.6 THOU/MM3 (ref 1.8–7.7)
SODIUM BLD-SCNC: 144 MEQ/L (ref 135–145)
T4 FREE: 1.44 NG/DL (ref 0.93–1.76)
TOTAL PROTEIN: 7 G/DL (ref 6.1–8)
TSH SERPL DL<=0.05 MIU/L-ACNC: 0.21 UIU/ML (ref 0.4–4.2)
VITAMIN B-12: 640 PG/ML (ref 211–911)
VITAMIN D 25-HYDROXY: 35 NG/ML (ref 30–100)
WBC # BLD: 10.6 THOU/MM3 (ref 4.8–10.8)

## 2021-05-05 LAB — T3 FREE: 2.52 PG/ML (ref 2.02–4.43)

## 2021-05-06 LAB — ANA SCREEN: NORMAL

## 2021-05-20 ENCOUNTER — OFFICE VISIT (OUTPATIENT)
Dept: FAMILY MEDICINE CLINIC | Age: 76
End: 2021-05-20
Payer: MEDICARE

## 2021-05-20 VITALS
BODY MASS INDEX: 40.41 KG/M2 | SYSTOLIC BLOOD PRESSURE: 134 MMHG | DIASTOLIC BLOOD PRESSURE: 58 MMHG | RESPIRATION RATE: 20 BRPM | WEIGHT: 261.9 LBS | HEART RATE: 80 BPM

## 2021-05-20 DIAGNOSIS — E03.9 HYPOTHYROIDISM, UNSPECIFIED TYPE: ICD-10-CM

## 2021-05-20 DIAGNOSIS — Z99.89 OSA ON CPAP: ICD-10-CM

## 2021-05-20 DIAGNOSIS — G47.00 INSOMNIA, UNSPECIFIED TYPE: ICD-10-CM

## 2021-05-20 DIAGNOSIS — E66.01 MORBID OBESITY WITH BMI OF 40.0-44.9, ADULT (HCC): ICD-10-CM

## 2021-05-20 DIAGNOSIS — R79.89 LOW TSH LEVEL: Primary | ICD-10-CM

## 2021-05-20 DIAGNOSIS — G47.33 OSA ON CPAP: ICD-10-CM

## 2021-05-20 PROCEDURE — 99214 OFFICE O/P EST MOD 30 MIN: CPT | Performed by: FAMILY MEDICINE

## 2021-05-20 RX ORDER — TEMAZEPAM 15 MG/1
15 CAPSULE ORAL NIGHTLY PRN
Qty: 90 CAPSULE | Refills: 0 | Status: SHIPPED | OUTPATIENT
Start: 2021-05-20 | End: 2021-06-01 | Stop reason: SDUPTHER

## 2021-05-20 RX ORDER — LEVOTHYROXINE SODIUM 0.12 MG/1
125 TABLET ORAL DAILY
Qty: 90 TABLET | Refills: 1 | Status: SHIPPED | OUTPATIENT
Start: 2021-05-20 | End: 2021-08-19

## 2021-05-20 ASSESSMENT — PATIENT HEALTH QUESTIONNAIRE - PHQ9
SUM OF ALL RESPONSES TO PHQ QUESTIONS 1-9: 0

## 2021-05-20 ASSESSMENT — ENCOUNTER SYMPTOMS
GASTROINTESTINAL NEGATIVE: 1
RESPIRATORY NEGATIVE: 1

## 2021-05-20 NOTE — PROGRESS NOTES
2021    Eva Orellana (:  1945) is a 77 y.o. female, here for a preventive medicine evaluation. Chief Complaint   Patient presents with    Follow-up    Hypothyroidism    Discuss Labs    Medication Refill    Insomnia     BP well controlled. BP Readings from Last 3 Encounters:   21 (!) 134/58   20 128/70   20 116/68     Weight stable. Wt Readings from Last 3 Encounters:   21 261 lb 14.4 oz (118.8 kg)   20 260 lb (117.9 kg)   20 263 lb 1.6 oz (119.3 kg)     Pt here due to low TSH. Denies symptoms of hyperthyroid. Lab Results   Component Value Date    TSH 0.207 (L) 2021     She is not sleeping well. Would like a refill of Restoril. Patient Active Problem List   Diagnosis    Hypothyroid    Environmental allergies    Hyperlipidemia with target LDL less than 100    Post menopausal problems    Claustrophobia    Difficulty waking    Morbid obesity with BMI of 40.0-44.9, adult ([facility] [address]    ARTIS on CPAP    Insomnia       Review of Systems   Constitutional: Negative. HENT: Negative. Respiratory: Negative. Cardiovascular: Negative. Gastrointestinal: Negative. Musculoskeletal: Negative. Psychiatric/Behavioral: Positive for sleep disturbance. All other systems reviewed and are negative. Prior to Visit Medications    Medication Sig Taking? Authorizing Provider   levothyroxine (SYNTHROID) 125 MCG tablet Take 1 tablet by mouth daily Yes Pasha Priest,    temazepam (RESTORIL) 15 MG capsule Take 1 capsule by mouth nightly as needed for Sleep for up to 90 days.  Yes Pasha Priest DO   celecoxib (CELEBREX) 100 MG capsule Take 1 capsule by mouth 2 times daily Yes Pasha Priest, DO   Lactobacillus (ACIDOPHILUS) 100 MG CAPS Take by mouth Yes Historical Provider, MD   triamcinolone (NASACORT ALLERGY 24HR) 55 MCG/ACT nasal inhaler 2 sprays by Nasal route daily as needed Yes Historical Provider, MD   vitamin B-12 (CYANOCOBALAMIN) 500 MCG tablet Take 500 mcg by mouth daily Indications: 2500mg a day Yes Historical Provider, MD   Niacin (VITAMIN B-3 PO) Take by mouth daily  Yes Historical Provider, MD   Magnesium 500 MG CAPS Take by mouth daily  Yes Historical Provider, MD   vitamin E 400 UNIT capsule Take 400 Units by mouth daily Yes Historical Provider, MD   Cholecalciferol (VITAMIN D3) 01541 UNITS CAPS Take 10,000 Units by mouth daily. Yes Historical Provider, MD   therapeutic multivitamin-minerals (THERAGRAN-M) tablet Take 1 tablet by mouth daily. Yes Historical Provider, MD        Allergies   Allergen Reactions    Naproxen Swelling       Past Medical History:   Diagnosis Date    Allergic rhinitis     Chronic kidney disease     Claustrophobia 2016    Hyperlipidemia     Obesity     ARTIS on CPAP        Past Surgical History:   Procedure Laterality Date    COLONOSCOPY      Wisser    EYE SURGERY Right 2019    Cataract Surgery with Dr. Raj Mora Left 2019    Cataract Surgery with Dr. Akua Bell Left     Dr Bora Sloan Left 2018    COLONOSCOPY SCREENING performed by Sondra Pinedo DO at [facility]       Social History     Socioeconomic History    Marital status:      Spouse name: Fabian Woods Number of children: 6    Years of education: 16    Highest education level:  Bachelor's degree (e.g., BA, AB, BS)   Occupational History    Not on file   Tobacco Use    Smoking status: Former Smoker     Packs/day: 0.75     Years: 5.00     Pack years: 3.75     Types: Cigarettes     Quit date: 1996     Years since quittin.0    Smokeless tobacco: Never Used    Tobacco comment: quit    Substance and Sexual Activity    Alcohol use: Yes     Comment: socially    Drug use: No    Sexual activity: Never   Other Topics Concern    Not on file   Social History Narrative    Not on file     Social Determinants of Health Financial Resource Strain: Low Risk     Difficulty of Paying Living Expenses: Not hard at all   Food Insecurity: No Food Insecurity    Worried About Running Out of Food in the Last Year: Never true    Uriah of Food in the Last Year: Never true   Transportation Needs: No Transportation Needs    Lack of Transportation (Medical): No    Lack of Transportation (Non-Medical): No   Physical Activity:     Days of Exercise per Week:     Minutes of Exercise per Session:    Stress:     Feeling of Stress :    Social Connections:     Frequency of Communication with Friends and Family:     Frequency of Social Gatherings with Friends and Family:     Attends Jainism Services:     Active Member of Clubs or Organizations:     Attends Club or Organization Meetings:     Marital Status:    Intimate Partner Violence:     Fear of Current or Ex-Partner:     Emotionally Abused:     Physically Abused:     Sexually Abused:         Family History   Problem Relation Age of Onset    Dementia Mother     Diabetes Father        ADVANCE DIRECTIVE: N, <no information>    Vitals:    05/20/21 0808   BP: (!) 134/58   Site: Left Upper Arm   Position: Sitting   Cuff Size: Large Adult   Pulse: 80   Resp: 20   Weight: 261 lb 14.4 oz (118.8 kg)     Estimated body mass index is 40.41 kg/m² as calculated from the following:    Height as of 12/16/20: 5' 7.5\" (1.715 m). Weight as of this encounter: 261 lb 14.4 oz (118.8 kg). Physical Exam  Vitals and nursing note reviewed. Constitutional:       General: She is not in acute distress. Appearance: Normal appearance. She is well-developed. HENT:      Head: Normocephalic and atraumatic. Right Ear: Tympanic membrane normal.      Left Ear: Tympanic membrane normal.   Eyes:      Conjunctiva/sclera: Conjunctivae normal.   Cardiovascular:      Rate and Rhythm: Normal rate and regular rhythm. Heart sounds: Normal heart sounds. No murmur heard.      Pulmonary:      Effort: Pulmonary effort is normal.      Breath sounds: Normal breath sounds. No wheezing, rhonchi or rales. Abdominal:      General: There is no distension. Musculoskeletal:      Cervical back: Neck supple. Skin:     General: Skin is warm and dry. Findings: No rash (on exposed surfaces). Neurological:      General: No focal deficit present. Mental Status: She is alert. Psychiatric:         Attention and Perception: Attention normal.         Mood and Affect: Mood normal.         Speech: Speech normal.         Behavior: Behavior normal. Behavior is cooperative. Thought Content: Thought content normal.         Judgment: Judgment normal.         No flowsheet data found.     Lab Results   Component Value Date    CHOL 211 08/15/2020    CHOL 184 05/16/2019    CHOL 177 05/15/2018    TRIG 120 08/15/2020    TRIG 110 05/16/2019    TRIG 116 05/15/2018    HDL 56 08/15/2020    HDL 55 05/16/2019    HDL 49 05/15/2018    LDLCALC 131 08/15/2020    LDLCALC 107 05/16/2019    LDLCALC 105 05/15/2018    GLUCOSE 111 05/03/2021    LABA1C 5.7 08/15/2020    LABA1C 5.3 05/16/2019       The 10-year ASCVD risk score (Media Pile., et al., 2013) is: 15.8%    Values used to calculate the score:      Age: 77 years      Sex: Female      Is Non- : No      Diabetic: No      Tobacco smoker: No      Systolic Blood Pressure: 811 mmHg      Is BP treated: No      HDL Cholesterol: 56 mg/dL      Total Cholesterol: 211 mg/dL    Immunization History   Administered Date(s) Administered    COVID-19, Moderna, PF, 100mcg/0.5mL 02/19/2021, 03/19/2021    Influenza Vaccine, unspecified formulation 10/26/2010    Influenza Virus Vaccine 12/03/2014    Pneumococcal Conjugate 13-valent (Hztcack20) 11/06/2018    Pneumococcal Polysaccharide ([id number]) 12/03/2014    Td vaccine (adult) 03/21/2001    Tdap (Boostrix, Adacel) 02/25/2020    Zoster Live (Zostavax) 11/13/2015       Health Maintenance   Topic Date Due    Hepatitis C screen  Never done    Shingles Vaccine (2 of 3) 01/08/2016    Annual Wellness Visit (AWV)  Never done    A1C test (Diabetic or Prediabetic)  08/15/2021    Flu vaccine (Season Ended) 09/01/2021    Breast cancer screen  11/27/2021    TSH testing  05/03/2022    Lipid screen  08/15/2025    Colon cancer screen colonoscopy  01/08/2028    DTaP/Tdap/Td vaccine (2 - Td) 02/25/2030    Pneumococcal 65+ years Vaccine  Completed    COVID-19 Vaccine  Completed    DEXA (modify frequency per FRAX score)  Addressed    Hepatitis A vaccine  Aged Out    Hepatitis B vaccine  Aged Out    Hib vaccine  Aged Out    Meningococcal (ACWY) vaccine  Aged Out          ASSESSMENT/PLAN:  1. Low TSH level  2. Hypothyroidism, unspecified type  -     levothyroxine (SYNTHROID) 125 MCG tablet; Take 1 tablet by mouth daily, Disp-90 tablet, R-1Normal  -     TSH with Reflex; Future  3. Morbid obesity with BMI of 40.0-44.9, adult ([facility] [address]  4. Insomnia, unspecified type  -     temazepam (RESTORIL) 15 MG capsule; Take 1 capsule by mouth nightly as needed for Sleep for up to 90 days. , Disp-90 capsule, R-0Normal  5. ARTIS on CPAP    -  Chronic medical problems stable  -  Labs reviewed, look ok other than low TSH  -  Continue current medications, will lower her Synthroid  -  Follow up with specialists as scheduled  -  Recheck TSH 3 mos      Return in about 3 months (around 8/20/2021) for Hypothyroid. An electronic signature was used to authenticate this note.     --Baljinder Urbano, DO on 5/20/2021 at 9:26 AM

## 2021-05-28 ENCOUNTER — PATIENT MESSAGE (OUTPATIENT)
Dept: FAMILY MEDICINE CLINIC | Age: 76
End: 2021-05-28

## 2021-05-28 DIAGNOSIS — G47.00 INSOMNIA, UNSPECIFIED TYPE: ICD-10-CM

## 2021-06-01 RX ORDER — TEMAZEPAM 15 MG/1
15 CAPSULE ORAL NIGHTLY PRN
Qty: 90 CAPSULE | Refills: 0 | Status: SHIPPED | OUTPATIENT
Start: 2021-06-01 | End: 2021-08-19

## 2021-06-01 NOTE — TELEPHONE ENCOUNTER
From: Gisel Orellana  To: Francis Starks DO  Sent: 5/28/2021 12:33 PM EDT  Subject: Prescription Question    Your requires coverage review but we were unable to reach your doctor. Please discuss treatment options with your doctor or have your doctor complete the review by calling [phone]. If we receive authorization within 21 days we'll begin reprocessing your prescription. After that, please call Member Services  ________________________  This came from [facility]-------Can this be done? If not is there a gentle sleep aid that will be able to be prescribed?     Jennifer Burkett  [phone]

## 2021-06-23 ENCOUNTER — OFFICE VISIT (OUTPATIENT)
Dept: CARDIOLOGY CLINIC | Age: 76
End: 2021-06-23
Payer: MEDICARE

## 2021-06-23 VITALS
HEART RATE: 80 BPM | BODY MASS INDEX: 40.47 KG/M2 | WEIGHT: 267 LBS | SYSTOLIC BLOOD PRESSURE: 142 MMHG | DIASTOLIC BLOOD PRESSURE: 80 MMHG | HEIGHT: 68 IN

## 2021-06-23 DIAGNOSIS — I10 ESSENTIAL HYPERTENSION: ICD-10-CM

## 2021-06-23 DIAGNOSIS — R94.31 EKG, ABNORMAL: Primary | ICD-10-CM

## 2021-06-23 DIAGNOSIS — R06.02 SOB (SHORTNESS OF BREATH): ICD-10-CM

## 2021-06-23 PROCEDURE — 93000 ELECTROCARDIOGRAM COMPLETE: CPT | Performed by: NUCLEAR MEDICINE

## 2021-06-23 PROCEDURE — 99213 OFFICE O/P EST LOW 20 MIN: CPT | Performed by: NUCLEAR MEDICINE

## 2021-06-23 NOTE — PROGRESS NOTES
Lactobacillus (ACIDOPHILUS) 100 MG CAPS Take by mouth      triamcinolone (NASACORT ALLERGY 24HR) 55 MCG/ACT nasal inhaler 2 sprays by Nasal route daily as needed      vitamin B-12 (CYANOCOBALAMIN) 500 MCG tablet Take 500 mcg by mouth daily Indications: 2500mg a day      Niacin (VITAMIN B-3 PO) Take by mouth daily       Magnesium 500 MG CAPS Take by mouth daily       vitamin E 400 UNIT capsule Take 400 Units by mouth daily      Cholecalciferol (VITAMIN D3) 55232 UNITS CAPS Take 10,000 Units by mouth daily.  therapeutic multivitamin-minerals (THERAGRAN-M) tablet Take 1 tablet by mouth daily. No current facility-administered medications for this visit.      Allergies   Allergen Reactions    Naproxen Swelling     Health Maintenance   Topic Date Due    Hepatitis C screen  Never done    Shingles Vaccine (2 of 3) 01/08/2016    Annual Wellness Visit (AWV)  Never done    A1C test (Diabetic or Prediabetic)  08/15/2021    Flu vaccine (Season Ended) 09/01/2021    Breast cancer screen  11/27/2021    TSH testing  05/03/2022    Lipid screen  08/15/2025    Colon cancer screen colonoscopy  01/08/2028    DTaP/Tdap/Td vaccine (2 - Td or Tdap) 02/25/2030    Pneumococcal 65+ years Vaccine  Completed    COVID-19 Vaccine  Completed    DEXA (modify frequency per FRAX score)  Addressed    Hepatitis A vaccine  Aged Out    Hepatitis B vaccine  Aged Out    Hib vaccine  Aged Out    Meningococcal (ACWY) vaccine  Aged Out       Subjective:  Review of Systems  General:   No fever, no chills, No fatigue or weight loss  Pulmonary:    baseline dyspnea, no wheezing  Cardiac:    Denies recent chest pain,   GI:     No nausea or vomiting, no abdominal pain  Neuro:    No dizziness or light headedness,   Musculoskeletal:  No recent active issues  Extremities:   No edema, no obvious claudication       Objective:  Physical Exam  BP (!) 142/80   Pulse 80   Ht 5' 7.5\" (1.715 m)   Wt 267 lb (121.1 kg)   BMI 41.20 kg/m² General:   Well developed, well nourished  Lungs:   Clear to auscultation  Heart:    Normal S1 S2, Slight murmur. no rubs, no gallops  Abdomen:   Soft, non tender, no organomegalies, positive bowel sounds  Extremities:   No edema, no cyanosis, good peripheral pulses  Neurological:   Awake, alert, oriented. No obvious focal deficits  Musculoskelatal:  No obvious deformities    Assessment:      Diagnosis Orders   1. EKG, abnormal  EKG 12 Lead   2. SOB (shortness of breath)  EKG 12 Lead   3. Essential hypertension  EKG 12 Lead   as above  Cardiac seems to be stable   ECG in office was done today. I reviewed the ECG. No acute findings      Plan:  No follow-ups on file. As above  Continue risk factor modification and medical management  Thank you for allowing me to participate in the care of your patient. Please don't hesitate to contact me regarding any further issues related to the patient care    Orders Placed:  Orders Placed This Encounter   Procedures    EKG 12 Lead     Order Specific Question:   Reason for Exam?     Answer: Other       Medications Prescribed:  No orders of the defined types were placed in this encounter. Discussed use, benefit, and side effects of prescribed medications. All patient questions answered. Pt voicedunderstanding. Instructed to continue current medications, diet and exercise. Continue risk factor modification and medical management. Patient agreed with treatment plan. Follow up as directed.     Electronically signedby Kristine Rice MD on 6/23/2021 at 1:45 PM

## 2021-06-28 ENCOUNTER — PATIENT MESSAGE (OUTPATIENT)
Dept: FAMILY MEDICINE CLINIC | Age: 76
End: 2021-06-28

## 2021-06-28 DIAGNOSIS — M19.90 INFLAMMATORY ARTHRITIS: Primary | ICD-10-CM

## 2021-06-28 NOTE — TELEPHONE ENCOUNTER
From: Michael Orellana  To: John Colon DO  Sent: 6/28/2021 3:49 PM EDT  Subject: Non-Urgent Medical Question    Dr. Gustavo Harrington,    I have been taking Celebrex for some time now and it works very well. One of the side effects is water retention with swelling of hands and feet. NSAIDS are not my friends and have that effect. If there is another medication for inflammation, I am willing to try it. If all inflammation relief is in the same NSAID family , then could you refer me to Dr. Alfonso Lora of [facility] His fax is [phone]. He wants a diagnosis---just tell him I am -- \"old, crankie, and in pain! \"  Thank you!   Jonathan Rizvi  [phone]

## 2021-08-17 ENCOUNTER — NURSE ONLY (OUTPATIENT)
Dept: LAB | Age: 76
End: 2021-08-17

## 2021-08-17 DIAGNOSIS — E03.9 HYPOTHYROIDISM, UNSPECIFIED TYPE: ICD-10-CM

## 2021-08-17 LAB
T4 FREE: 1.09 NG/DL (ref 0.93–1.76)
TSH SERPL DL<=0.05 MIU/L-ACNC: 9.74 UIU/ML (ref 0.4–4.2)

## 2021-08-18 DIAGNOSIS — E03.9 HYPOTHYROIDISM, UNSPECIFIED TYPE: ICD-10-CM

## 2021-08-19 ENCOUNTER — OFFICE VISIT (OUTPATIENT)
Dept: FAMILY MEDICINE CLINIC | Age: 76
End: 2021-08-19
Payer: MEDICARE

## 2021-08-19 VITALS
HEIGHT: 68 IN | WEIGHT: 267 LBS | HEART RATE: 76 BPM | SYSTOLIC BLOOD PRESSURE: 128 MMHG | RESPIRATION RATE: 16 BRPM | BODY MASS INDEX: 40.47 KG/M2 | DIASTOLIC BLOOD PRESSURE: 74 MMHG

## 2021-08-19 DIAGNOSIS — M19.90 INFLAMMATORY ARTHRITIS: ICD-10-CM

## 2021-08-19 DIAGNOSIS — G47.33 OSA ON CPAP: ICD-10-CM

## 2021-08-19 DIAGNOSIS — Z99.89 OSA ON CPAP: ICD-10-CM

## 2021-08-19 DIAGNOSIS — Z00.00 ROUTINE GENERAL MEDICAL EXAMINATION AT A HEALTH CARE FACILITY: Primary | ICD-10-CM

## 2021-08-19 DIAGNOSIS — E66.01 MORBID OBESITY WITH BMI OF 40.0-44.9, ADULT (HCC): ICD-10-CM

## 2021-08-19 DIAGNOSIS — E03.9 HYPOTHYROIDISM, UNSPECIFIED TYPE: ICD-10-CM

## 2021-08-19 DIAGNOSIS — G47.00 INSOMNIA, UNSPECIFIED TYPE: ICD-10-CM

## 2021-08-19 PROCEDURE — 99213 OFFICE O/P EST LOW 20 MIN: CPT | Performed by: FAMILY MEDICINE

## 2021-08-19 PROCEDURE — G0438 PPPS, INITIAL VISIT: HCPCS | Performed by: FAMILY MEDICINE

## 2021-08-19 RX ORDER — BETAMETHASONE DIPROPIONATE 0.5 MG/G
CREAM TOPICAL
Qty: 50 G | Refills: 2 | Status: SHIPPED | OUTPATIENT
Start: 2021-08-19 | End: 2021-11-22 | Stop reason: SDUPTHER

## 2021-08-19 RX ORDER — LEVOTHYROXINE SODIUM 150 MCG
150 TABLET ORAL DAILY
Qty: 30 TABLET | Refills: 2 | Status: SHIPPED | OUTPATIENT
Start: 2021-08-19 | End: 2021-11-22 | Stop reason: SDUPTHER

## 2021-08-19 RX ORDER — LEVOTHYROXINE SODIUM 0.15 MG/1
150 TABLET ORAL DAILY
Qty: 90 TABLET | Refills: 3 | Status: SHIPPED | OUTPATIENT
Start: 2021-08-19 | End: 2021-11-22

## 2021-08-19 ASSESSMENT — PATIENT HEALTH QUESTIONNAIRE - PHQ9
SUM OF ALL RESPONSES TO PHQ9 QUESTIONS 1 & 2: 0
SUM OF ALL RESPONSES TO PHQ QUESTIONS 1-9: 0
1. LITTLE INTEREST OR PLEASURE IN DOING THINGS: 0
2. FEELING DOWN, DEPRESSED OR HOPELESS: 0

## 2021-08-19 ASSESSMENT — LIFESTYLE VARIABLES
HOW MANY STANDARD DRINKS CONTAINING ALCOHOL DO YOU HAVE ON A TYPICAL DAY: 0
HAS A RELATIVE, FRIEND, DOCTOR, OR ANOTHER HEALTH PROFESSIONAL EXPRESSED CONCERN ABOUT YOUR DRINKING OR SUGGESTED YOU CUT DOWN: 0
HOW OFTEN DURING THE LAST YEAR HAVE YOU BEEN UNABLE TO REMEMBER WHAT HAPPENED THE NIGHT BEFORE BECAUSE YOU HAD BEEN DRINKING: 0
HOW OFTEN DURING THE LAST YEAR HAVE YOU HAD A FEELING OF GUILT OR REMORSE AFTER DRINKING: 0
AUDIT-C TOTAL SCORE: 1
HOW OFTEN DO YOU HAVE SIX OR MORE DRINKS ON ONE OCCASION: 0
HAVE YOU OR SOMEONE ELSE BEEN INJURED AS A RESULT OF YOUR DRINKING: 0
HOW OFTEN DURING THE LAST YEAR HAVE YOU FAILED TO DO WHAT WAS NORMALLY EXPECTED FROM YOU BECAUSE OF DRINKING: 0
HOW OFTEN DURING THE LAST YEAR HAVE YOU NEEDED AN ALCOHOLIC DRINK FIRST THING IN THE MORNING TO GET YOURSELF GOING AFTER A NIGHT OF HEAVY DRINKING: 0
AUDIT TOTAL SCORE: 1
HOW OFTEN DURING THE LAST YEAR HAVE YOU FOUND THAT YOU WERE NOT ABLE TO STOP DRINKING ONCE YOU HAD STARTED: 0
HOW OFTEN DO YOU HAVE A DRINK CONTAINING ALCOHOL: 1

## 2021-08-19 ASSESSMENT — ENCOUNTER SYMPTOMS
RESPIRATORY NEGATIVE: 1
GASTROINTESTINAL NEGATIVE: 1

## 2021-08-19 NOTE — PATIENT INSTRUCTIONS
You may receive a survey about your visit with us today. The feedback from our patients helps us identify what is working well and where the service to all patients can be enhanced. Thank you! Personalized Preventive Plan for Lindsay Orellana - 8/19/2021  Medicare offers a range of preventive health benefits. Some of the tests and screenings are paid in full while other may be subject to a deductible, co-insurance, and/or copay. Some of these benefits include a comprehensive review of your medical history including lifestyle, illnesses that may run in your family, and various assessments and screenings as appropriate. After reviewing your medical record and screening and assessments performed today your provider may have ordered immunizations, labs, imaging, and/or referrals for you. A list of these orders (if applicable) as well as your Preventive Care list are included within your After Visit Summary for your review. Other Preventive Recommendations:    · A preventive eye exam performed by an eye specialist is recommended every 1-2 years to screen for glaucoma; cataracts, macular degeneration, and other eye disorders. · A preventive dental visit is recommended every 6 months. · Try to get at least 150 minutes of exercise per week or 10,000 steps per day on a pedometer . · Order or download the FREE \"Exercise & Physical Activity: Your Everyday Guide\" from [employer]. Call 0-761.118.2510 or search [employer] online. · You need 6185-1067 mg of calcium and 2473-3688 IU of vitamin D per day. It is possible to meet your calcium requirement with diet alone, but a vitamin D supplement is usually necessary to meet this goal.  · When exposed to the sun, use a sunscreen that protects against both UVA and UVB radiation with an SPF of 30 or greater. Reapply every 2 to 3 hours or after sweating, drying off with a towel, or swimming.   · Always wear a seat belt when traveling in a car. Always wear a helmet when riding a bicycle or motorcycle.

## 2021-08-19 NOTE — PROGRESS NOTES
Chronic Disease Visit Information    BP Readings from Last 3 Encounters:   08/19/21 128/74   06/23/21 (!) 142/80   05/20/21 (!) 134/58          Hemoglobin A1C (%)   Date Value   08/15/2020 5.7   05/16/2019 5.3     LDL Calculated (mg/dL)   Date Value   08/15/2020 131     HDL (mg/dL)   Date Value   08/15/2020 56     BUN (mg/dL)   Date Value   05/03/2021 19     CREATININE (mg/dL)   Date Value   05/03/2021 0.6     Glucose (mg/dL)   Date Value   05/03/2021 111 (H)            Have you changed or started any medications since your last visit including any over-the-counter medicines, vitamins, or herbal medicines? no   Are you having any side effects from any of your medications? -  no  Have you stopped taking any of your medications? Is so, why? -  no    Have you seen any other physician or provider since your last visit? No  Have you had any other diagnostic tests since your last visit? Yes - Records Obtained  Have you been seen in the emergency room and/or had an admission to a hospital since we last saw you? No  Have you had your annual diabetic retinal (eye) exam? Yes - Records Requested  Have you had your routine dental cleaning in the past 6 months? yes - 4/2021    Have you activated your [employer] account? If not, what are your barriers?  Yes     Patient Care Team:  Susan Gordillo DO as PCP - General (Family Medicine)  Susan Gordillo DO as PCP - [facility]         Medical History Review  Past Medical, Family, and Social History reviewed and does contribute to the patient presenting condition    Health Maintenance   Topic Date Due    Hepatitis C screen  Never done    Shingles Vaccine (2 of 3) 01/08/2016    Annual Wellness Visit (AWV)  Never done    A1C test (Diabetic or Prediabetic)  08/15/2021    Flu vaccine (1) 09/01/2021    TSH testing  08/17/2022    Lipid screen  08/15/2025    Colon cancer screen colonoscopy  01/08/2028    DTaP/Tdap/Td vaccine (2 - Td or Tdap) 02/25/2030   

## 2021-08-19 NOTE — PROGRESS NOTES
by Nasal route daily as needed Yes Historical Provider, MD   vitamin B-12 (CYANOCOBALAMIN) 500 MCG tablet Take 500 mcg by mouth daily Indications: 2500mg a day Yes Historical Provider, MD   Niacin (VITAMIN B-3 PO) Take by mouth daily  Yes Historical Provider, MD   Magnesium 500 MG CAPS Take by mouth daily  Yes Historical Provider, MD   vitamin E 400 UNIT capsule Take 400 Units by mouth daily Yes Historical Provider, MD   Cholecalciferol (VITAMIN D3) 67993 UNITS CAPS Take 10,000 Units by mouth daily. Yes Historical Provider, MD   therapeutic multivitamin-minerals (THERAGRAN-M) tablet Take 1 tablet by mouth daily. Yes Historical Provider, MD        Allergies   Allergen Reactions    Naproxen Swelling       Past Medical History:   Diagnosis Date    Allergic rhinitis     Chronic kidney disease     Claustrophobia 2016    Hyperlipidemia     Obesity     ARTIS on CPAP        Past Surgical History:   Procedure Laterality Date    COLONOSCOPY  2007    Wisser    EYE SURGERY Right 2019    Cataract Surgery with Dr. Tang Bhandari Left 2019    Cataract Surgery with Dr. Robert Encarnacion Left     Dr Colleen Ventura Left 2018    COLONOSCOPY SCREENING performed by Grayson Cheng DO at [facility]       Social History     Socioeconomic History    Marital status:      Spouse name: Fouzia Quan Number of children: 6    Years of education: 16    Highest education level:  Bachelor's degree (e.g., BA, AB, BS)   Occupational History    Not on file   Tobacco Use    Smoking status: Former Smoker     Packs/day: 0.75     Years: 5.00     Pack years: 3.75     Types: Cigarettes     Quit date: 1996     Years since quittin.3    Smokeless tobacco: Never Used    Tobacco comment: quit    Substance and Sexual Activity    Alcohol use: Yes     Comment: socially    Drug use: No    Sexual activity: Never   Other Topics Concern    Not on file   Social History Narrative    Not on file     Social Determinants of Health     Financial Resource Strain: Low Risk     Difficulty of Paying Living Expenses: Not hard at all   Food Insecurity: No Food Insecurity    Worried About Running Out of Food in the Last Year: Never true    [address] in the Last Year: Never true   Transportation Needs: No Transportation Needs    Lack of Transportation (Medical): No    Lack of Transportation (Non-Medical): No   Physical Activity:     Days of Exercise per Week:     Minutes of Exercise per Session:    Stress:     Feeling of Stress :    Social Connections:     Frequency of Communication with Friends and Family:     Frequency of Social Gatherings with Friends and Family:     Attends Yazidism Services:     Active Member of Clubs or Organizations:     Attends Club or Organization Meetings:     Marital Status:    Intimate Partner Violence:     Fear of Current or Ex-Partner:     Emotionally Abused:     Physically Abused:     Sexually Abused:         Family History   Problem Relation Age of Onset    Dementia Mother     Diabetes Father        ADVANCE DIRECTIVE: N, <no information>    Vitals:    08/19/21 1027   BP: 128/74   Site: Left Upper Arm   Position: Sitting   Cuff Size: Large Adult   Pulse: 76   Resp: 16   Weight: 267 lb (121.1 kg)   Height: 5' 7.5\" (1.715 m)     Estimated body mass index is 41.2 kg/m² as calculated from the following:    Height as of this encounter: 5' 7.5\" (1.715 m). Weight as of this encounter: 267 lb (121.1 kg). Physical Exam  Vitals and nursing note reviewed. Constitutional:       General: She is not in acute distress. Appearance: Normal appearance. She is well-developed. HENT:      Head: Normocephalic and atraumatic. Right Ear: Tympanic membrane normal.      Left Ear: Tympanic membrane normal.   Eyes:      Conjunctiva/sclera: Conjunctivae normal.   Cardiovascular:      Rate and Rhythm: Normal rate and regular rhythm. Heart sounds: Normal heart sounds. No murmur heard. Pulmonary:      Effort: Pulmonary effort is normal.      Breath sounds: Normal breath sounds. No wheezing, rhonchi or rales. Abdominal:      General: There is no distension. Musculoskeletal:      Cervical back: Neck supple. Skin:     General: Skin is warm and dry. Findings: No rash (on exposed surfaces). Neurological:      General: No focal deficit present. Mental Status: She is alert. Psychiatric:         Attention and Perception: Attention normal.         Mood and Affect: Mood normal.         Speech: Speech normal.         Behavior: Behavior normal. Behavior is cooperative. Thought Content: Thought content normal.         Judgment: Judgment normal.         No flowsheet data found.     Lab Results   Component Value Date    CHOL 211 08/15/2020    CHOL 184 05/16/2019    CHOL 177 05/15/2018    TRIG 120 08/15/2020    TRIG 110 05/16/2019    TRIG 116 05/15/2018    HDL 56 08/15/2020    HDL 55 05/16/2019    HDL 49 05/15/2018    LDLCALC 131 08/15/2020    LDLCALC 107 05/16/2019    LDLCALC 105 05/15/2018    GLUCOSE 111 05/03/2021    LABA1C 5.7 08/15/2020    LABA1C 5.3 05/16/2019       The 10-year ASCVD risk score (Yadira Ramos et al., 2013) is: 16.1%    Values used to calculate the score:      Age: 77 years      Sex: Female      Is Non- : No      Diabetic: No      Tobacco smoker: No      Systolic Blood Pressure: 566 mmHg      Is BP treated: No      HDL Cholesterol: 56 mg/dL      Total Cholesterol: 211 mg/dL    Immunization History   Administered Date(s) Administered    COVID-19, Moderna, PF, 100mcg/0.5mL 02/19/2021, 03/19/2021    Influenza Vaccine, unspecified formulation 10/26/2010    Influenza Virus Vaccine 12/03/2014    Pneumococcal Conjugate 13-valent ([id number]) 11/06/2018    Pneumococcal Polysaccharide ([id number]) 12/03/2014    Td vaccine (adult) 03/21/2001    Tdap (Boostrix, Adacel) 02/25/2020    Zoster Live (Zostavax) 2015       Health Maintenance   Topic Date Due    Hepatitis C screen  Never done    Shingles Vaccine (2 of 3) 2016    Annual Wellness Visit (AWV)  Never done    A1C test (Diabetic or Prediabetic)  08/15/2021    Flu vaccine (1) 2021    TSH testing  2022    Lipid screen  08/15/2025    Colon cancer screen colonoscopy  2028    DTaP/Tdap/Td vaccine (2 - Td or Tdap) 2030    Pneumococcal 65+ years Vaccine  Completed    COVID-19 Vaccine  Completed    DEXA (modify frequency per FRAX score)  Addressed    Hepatitis A vaccine  Aged Out    Hepatitis B vaccine  Aged Out    Hib vaccine  Aged Out    Meningococcal (ACWY) vaccine  Aged Out          ASSESSMENT/PLAN:  1. Routine general medical examination at a health care facility  2. Hypothyroidism, unspecified type  -     SYNTHROID 150 MCG tablet; Take 1 tablet by mouth daily Take with water on an empty stomach- wait 30 minutes before eating or taking other meds. , Disp-30 tablet, R-2, DAWNormal  -     TSH with Reflex; Future  3. Inflammatory arthritis  4. Insomnia, unspecified type  5. Morbid obesity with BMI of 40.0-44.9, adult ([facility] [address]  6. ARTIS on CPAP    -  Chronic medical problems stable  -  Labs reviewed, thyroid not well controlled  -  Continue current medications, will increase her Synthroid to 150 mcg and change to TED  -  Follow up with specialists as scheduled  -  Recheck TSH 3 mos    Return in 3 days (on 2021) for hypothyroid. An electronic signature was used to authenticate this note. --Charla Coburn DO on 2021 at 12:38 PM    Medicare Annual Wellness Visit  Name: Woodrow Rouse Date: 2021   MRN: [id number] Sex: Female   Age: 77 y.o.  Ethnicity: Non- / Non    : 1945 Race: White (non-)      Ana Moreland is here for [employer] AWV, 3 Month Follow-Up, and Results    Screenings for behavioral, psychosocial and functional/safety risks, and cognitive dysfunction are all negative except as indicated below. These results, as well as other patient data from the [address] form, are documented in Flowsheets linked to this Encounter. Allergies   Allergen Reactions    Naproxen Swelling         Prior to Visit Medications    Medication Sig Taking? Authorizing Provider   augmented betamethasone dipropionate (DIPROLENE AF) 0.05 % cream Apply topically BID. Yes Hernando Hess, DO   levothyroxine (SYNTHROID) 150 MCG tablet Take 1 tablet by mouth daily Yes Hernando Hess, DO   SYNTHROID 150 MCG tablet Take 1 tablet by mouth daily Take with water on an empty stomach- wait 30 minutes before eating or taking other meds. Yes Hernando Hess, DO   Lactobacillus (ACIDOPHILUS) 100 MG CAPS Take by mouth Yes Historical Provider, MD   triamcinolone (NASACORT ALLERGY 24HR) 55 MCG/ACT nasal inhaler 2 sprays by Nasal route daily as needed Yes Historical Provider, MD   vitamin B-12 (CYANOCOBALAMIN) 500 MCG tablet Take 500 mcg by mouth daily Indications: 2500mg a day Yes Historical Provider, MD   Niacin (VITAMIN B-3 PO) Take by mouth daily  Yes Historical Provider, MD   Magnesium 500 MG CAPS Take by mouth daily  Yes Historical Provider, MD   vitamin E 400 UNIT capsule Take 400 Units by mouth daily Yes Historical Provider, MD   Cholecalciferol (VITAMIN D3) 71510 UNITS CAPS Take 10,000 Units by mouth daily. Yes Historical Provider, MD   therapeutic multivitamin-minerals (THERAGRAN-M) tablet Take 1 tablet by mouth daily.  Yes Historical Provider, MD         Past Medical History:   Diagnosis Date    Allergic rhinitis     Chronic kidney disease     Claustrophobia 2/24/2016    Hyperlipidemia     Obesity     ARTIS on CPAP        Past Surgical History:   Procedure Laterality Date    COLONOSCOPY  2007    Wisser    EYE SURGERY Right 02/28/2019    Cataract Surgery with Dr. Ann Marie Page Left 03/13/2019    Cataract Surgery with Dr. Сергей Laureano SURGERY Left 2016    Dr Atanacio Fabry NOT  [address] IND Left 1/8/2018    COLONOSCOPY SCREENING performed by Roger Patel DO at [facility]         Family History   Problem Relation Age of Onset    Dementia Mother     Diabetes Father        CareTeam (Including outside providers/suppliers regularly involved in providing care):   Patient Care Team:  Jame Navarro DO as PCP - General (Family Medicine)  Jame Navarro DO as PCP - [facility] Empaneled Provider    Wt Readings from Last 3 Encounters:   08/19/21 267 lb (121.1 kg)   06/23/21 267 lb (121.1 kg)   05/20/21 261 lb 14.4 oz (118.8 kg)     Vitals:    08/19/21 1027   BP: 128/74   Site: Left Upper Arm   Position: Sitting   Cuff Size: Large Adult   Pulse: 76   Resp: 16   Weight: 267 lb (121.1 kg)   Height: 5' 7.5\" (1.715 m)     Body mass index is 41.2 kg/m². Based upon direct observation of the patient, evaluation of cognition reveals recent and remote memory intact. Patient's complete Health Risk Assessment and screening values have been reviewed and are found in Flowsheets. The following problems were reviewed today and where indicated follow up appointments were made and/or referrals ordered.     Positive Risk Factor Screenings with Interventions:           Health Habits/Nutrition:  Health Habits/Nutrition  Do you exercise for at least 20 minutes 2-3 times per week?: Yes  Have you lost any weight without trying in the past 3 months?: No  Do you eat only one meal per day?: No  Have you seen the dentist within the past year?: Yes  Body mass index: (!) 41.2  Health Habits/Nutrition Interventions:  · Nutritional issues:  educational materials for healthy, well-balanced diet provided    Hearing/Vision:  No exam data present  Hearing/Vision  Do you or your family notice any trouble with your hearing that hasn't been managed with hearing aids?: No  Do you have difficulty driving, watching TV, or doing any of your daily activities because of your eyesight?: (!) Yes (macular degeneration--seeing specialist)  Have you had an eye exam within the past year?: Yes  Hearing/Vision Interventions:  · Vision concerns:  patient encouraged to make appointment with his/her eye specialist      Personalized Preventive Plan   Current Health Maintenance Status  Immunization History   Administered Date(s) Administered    COVID-19, Moderna, PF, 100mcg/0.5mL 02/19/2021, 03/19/2021    Influenza Vaccine, unspecified formulation 10/26/2010    Influenza Virus Vaccine 12/03/2014    Pneumococcal Conjugate 13-valent (Fzejopv03) 11/06/2018    Pneumococcal Polysaccharide (Fkvbooesr34) 12/03/2014    Td vaccine (adult) 03/21/2001    Tdap (Boostrix, Adacel) 02/25/2020    Zoster Live (Zostavax) 11/13/2015        Health Maintenance   Topic Date Due    Hepatitis C screen  Never done    Shingles Vaccine (2 of 3) 01/08/2016    Annual Wellness Visit (AWV)  Never done    A1C test (Diabetic or Prediabetic)  08/15/2021    Flu vaccine (1) 09/01/2021    TSH testing  08/17/2022    Lipid screen  08/15/2025    Colon cancer screen colonoscopy  01/08/2028    DTaP/Tdap/Td vaccine (2 - Td or Tdap) 02/25/2030    Pneumococcal 65+ years Vaccine  Completed    COVID-19 Vaccine  Completed    DEXA (modify frequency per FRAX score)  Addressed    Hepatitis A vaccine  Aged Out    Hepatitis B vaccine  Aged Out    Hib vaccine  Aged Out    Meningococcal (ACWY) vaccine  Aged Out     Recommendations for [employer] Due: see orders and patient instructions/AVS.  . Recommended screening schedule for the next 5-10 years is provided to the patient in written form: see Patient Osmin Nicole was seen today for medicare awv, 3 month follow-up and results. Diagnoses and all orders for this visit:    Routine general medical examination at a health care facility    Hypothyroidism, unspecified type  -     SYNTHROID 150 MCG tablet;  Take 1 tablet by mouth daily Take with water on an empty stomach- wait 30 minutes before eating or taking other meds. -     TSH with Reflex; Future    Inflammatory arthritis    Insomnia, unspecified type    Morbid obesity with BMI of 40.0-44.9, adult (HCC)    ARTIS on CPAP    Other orders  -     augmented betamethasone dipropionate (DIPROLENE AF) 0.05 % cream; Apply topically BID. -     levothyroxine (SYNTHROID) 150 MCG tablet;  Take 1 tablet by mouth daily

## 2021-08-23 ENCOUNTER — TELEPHONE (OUTPATIENT)
Dept: FAMILY MEDICINE CLINIC | Age: 76
End: 2021-08-23

## 2021-08-23 NOTE — TELEPHONE ENCOUNTER
PA request received from pharmacy for Synthroid 150mcg TED. PA submitted online at [employer] and approved. CaseId:[id number];Status:Approved; Review Type:Prior Auth; Coverage Start Date:07/24/2021; Coverage End Date:08/23/2022;    Pharmacy notified [facility]: Luis Arias

## 2021-09-22 RX ORDER — MONTELUKAST SODIUM 10 MG/1
TABLET ORAL
Qty: 90 TABLET | Refills: 3 | Status: SHIPPED | OUTPATIENT
Start: 2021-09-22

## 2021-09-28 ENCOUNTER — PATIENT MESSAGE (OUTPATIENT)
Dept: FAMILY MEDICINE CLINIC | Age: 76
End: 2021-09-28

## 2021-09-28 RX ORDER — AMOXICILLIN AND CLAVULANATE POTASSIUM 875; 125 MG/1; MG/1
1 TABLET, FILM COATED ORAL 2 TIMES DAILY
Qty: 14 TABLET | Refills: 0 | Status: SHIPPED | OUTPATIENT
Start: 2021-09-28 | End: 2021-10-05

## 2021-09-28 NOTE — TELEPHONE ENCOUNTER
From: Randal Orellana  To: Jerry Chopra DO  Sent: 9/28/2021 9:21 AM EDT  Subject: Non-Urgent Medical Question    Dr. Kristopher Trotter----  I have had a good allergy season this year with only using Nasacort. But for the last 10 days I have been fighting a sinus infection. When Blaine Stacy had his COPD issue week before last I stopped taking care of me and only took care of him. I can't get rid of the headache, low-grade fever, stuffy head and no energy. Would it be possible to get an antibiotic called in? ([facility] on [address]) I last saw you on August 19, am happy to come in , if you need me to.   Thank you so much,  Terry Jacinto

## 2021-11-22 ENCOUNTER — OFFICE VISIT (OUTPATIENT)
Dept: FAMILY MEDICINE CLINIC | Age: 76
End: 2021-11-22
Payer: MEDICARE

## 2021-11-22 VITALS
BODY MASS INDEX: 40.24 KG/M2 | WEIGHT: 260.8 LBS | SYSTOLIC BLOOD PRESSURE: 124 MMHG | HEART RATE: 96 BPM | DIASTOLIC BLOOD PRESSURE: 72 MMHG | RESPIRATION RATE: 20 BRPM

## 2021-11-22 DIAGNOSIS — E66.01 MORBID OBESITY WITH BMI OF 40.0-44.9, ADULT (HCC): ICD-10-CM

## 2021-11-22 DIAGNOSIS — E78.5 HYPERLIPIDEMIA WITH TARGET LDL LESS THAN 100: ICD-10-CM

## 2021-11-22 DIAGNOSIS — Z99.89 OSA ON CPAP: ICD-10-CM

## 2021-11-22 DIAGNOSIS — E03.9 HYPOTHYROIDISM, UNSPECIFIED TYPE: Primary | ICD-10-CM

## 2021-11-22 DIAGNOSIS — G47.33 OSA ON CPAP: ICD-10-CM

## 2021-11-22 DIAGNOSIS — G47.00 INSOMNIA, UNSPECIFIED TYPE: ICD-10-CM

## 2021-11-22 PROCEDURE — 99214 OFFICE O/P EST MOD 30 MIN: CPT | Performed by: FAMILY MEDICINE

## 2021-11-22 RX ORDER — LEVOTHYROXINE SODIUM 150 MCG
150 TABLET ORAL DAILY
Qty: 30 TABLET | Refills: 0 | Status: SHIPPED | OUTPATIENT
Start: 2021-11-22 | End: 2022-05-02 | Stop reason: SDUPTHER

## 2021-11-22 RX ORDER — BETAMETHASONE DIPROPIONATE 0.5 MG/G
CREAM TOPICAL
Qty: 50 G | Refills: 2 | Status: SHIPPED | OUTPATIENT
Start: 2021-11-22 | End: 2022-07-28

## 2021-11-22 RX ORDER — LEVOTHYROXINE SODIUM 150 MCG
150 TABLET ORAL DAILY
Qty: 90 TABLET | Refills: 3 | Status: SHIPPED | OUTPATIENT
Start: 2021-11-22 | End: 2021-11-22 | Stop reason: SDUPTHER

## 2021-11-22 ASSESSMENT — ENCOUNTER SYMPTOMS
GASTROINTESTINAL NEGATIVE: 1
RESPIRATORY NEGATIVE: 1

## 2021-11-22 NOTE — PROGRESS NOTES
Eva Orellana (:  1945) is a 77 y.o. female,Established patient, here for evaluation of the following chief complaint(s):  3 Month Follow-Up and Medication Refill        Subjective   SUBJECTIVE/OBJECTIVE:  HPI:    Chief Complaint   Patient presents with    3 Month Follow-Up    Medication Refill     3 month eval.  Doing well overall. BPs and weight stable. BP Readings from Last 3 Encounters:   21 124/72   21 128/74   21 (!) 142/80     Wt Readings from Last 3 Encounters:   21 260 lb 12.8 oz (118.3 kg)   21 267 lb (121.1 kg)   21 267 lb (121.1 kg)     TSH controlled. Lab Results   Component Value Date    TSH 0.566 2021       Patient Active Problem List   Diagnosis    Hypothyroid    Environmental allergies    Hyperlipidemia with target LDL less than 100    Post menopausal problems    Claustrophobia    Difficulty waking    Morbid obesity with BMI of 40.0-44.9, adult ([address]    ARTIS on CPAP    Insomnia     Past Surgical History:   Procedure Laterality Date    COLONOSCOPY  2007    Wisser    EYE SURGERY Right 2019    Cataract Surgery with Dr. Radford Gowers Left 2019    Cataract Surgery with Dr. Jenkins Fothergill Left     Dr Suzanne Matamoros Left 2018    COLONOSCOPY SCREENING performed by Baljit Pa DO at [facility]     Social History     Tobacco Use    Smoking status: Former Smoker     Packs/day: 0.75     Years: 5.00     Pack years: 3.75     Types: Cigarettes     Quit date: 1996     Years since quittin.5    Smokeless tobacco: Never Used    Tobacco comment: quit    Substance Use Topics    Alcohol use: Yes     Comment: socially    Drug use: No         Review of Systems   Constitutional: Negative. HENT: Negative. Respiratory: Negative. Cardiovascular: Negative. Gastrointestinal: Negative. Musculoskeletal: Negative.     All other systems reviewed and are negative. Objective   Physical Exam  Vitals and nursing note reviewed. Constitutional:       General: She is not in acute distress. Appearance: Normal appearance. She is well-developed. HENT:      Head: Normocephalic and atraumatic. Right Ear: Tympanic membrane normal.      Left Ear: Tympanic membrane normal.   Eyes:      Conjunctiva/sclera: Conjunctivae normal.   Cardiovascular:      Rate and Rhythm: Normal rate and regular rhythm. Heart sounds: Normal heart sounds. No murmur heard. Pulmonary:      Effort: Pulmonary effort is normal.      Breath sounds: Normal breath sounds. No wheezing, rhonchi or rales. Abdominal:      General: There is no distension. Musculoskeletal:      Cervical back: Neck supple. Skin:     General: Skin is warm and dry. Findings: No rash (on exposed surfaces). Neurological:      General: No focal deficit present. Mental Status: She is alert. Psychiatric:         Attention and Perception: Attention normal.         Mood and Affect: Mood normal.         Speech: Speech normal.         Behavior: Behavior normal. Behavior is cooperative. Thought Content: Thought content normal.         Judgment: Judgment normal.               ASSESSMENT/PLAN:  1. Hypothyroidism, unspecified type  -     SYNTHROID 150 MCG tablet; Take 1 tablet by mouth daily Take with water on an empty stomach- wait 30 minutes before eating or taking other meds. , Disp-30 tablet, R-0, DAWNormal  2. Insomnia, unspecified type  3. Morbid obesity with BMI of 40.0-44.9, adult ([address]  4. ARTIS on CPAP  5. Hyperlipidemia with target LDL less than 100    -  Chronic medical problems stable  -  Labs reviewed, look fine  -  Continue current medications  -  Follow up with specialists as scheduled      Return in about 1 year (around 11/22/2022) for Annual .             An electronic signature was used to authenticate this note.     --Heinz Denver, DO

## 2021-11-30 ENCOUNTER — TELEPHONE (OUTPATIENT)
Dept: FAMILY MEDICINE CLINIC | Age: 76
End: 2021-11-30

## 2021-11-30 NOTE — TELEPHONE ENCOUNTER
Josefina Fernandez with [facility] calling to let us know that Synthroid is the preferred medication at this time and they are requesting to remove the TED so it will save patient money. They will only dispense Synthroid at this time but at a generic cost to patient. If TED if left on Rx, it will cost patient a higher tier of medication. If Synthroid becomes not preferred they will notify the office by a letter and Dinah Hunt can be put back on medication at that time. Verbal order to removed TED given.

## 2021-12-02 ENCOUNTER — OFFICE VISIT (OUTPATIENT)
Dept: PULMONOLOGY | Age: 76
End: 2021-12-02
Payer: MEDICARE

## 2021-12-02 VITALS
OXYGEN SATURATION: 97 % | DIASTOLIC BLOOD PRESSURE: 72 MMHG | SYSTOLIC BLOOD PRESSURE: 120 MMHG | HEART RATE: 76 BPM | WEIGHT: 268 LBS | HEIGHT: 67 IN | TEMPERATURE: 98.1 F | BODY MASS INDEX: 42.06 KG/M2

## 2021-12-02 DIAGNOSIS — E66.01 MORBID OBESITY WITH BMI OF 40.0-44.9, ADULT (HCC): ICD-10-CM

## 2021-12-02 DIAGNOSIS — Z99.89 OSA ON CPAP: Primary | ICD-10-CM

## 2021-12-02 DIAGNOSIS — E66.01 OBESITY, CLASS III, BMI 40-49.9 (MORBID OBESITY) (HCC): ICD-10-CM

## 2021-12-02 DIAGNOSIS — G47.33 OSA ON CPAP: Primary | ICD-10-CM

## 2021-12-02 PROCEDURE — 99213 OFFICE O/P EST LOW 20 MIN: CPT | Performed by: PHYSICIAN ASSISTANT

## 2021-12-02 ASSESSMENT — ENCOUNTER SYMPTOMS
COUGH: 0
EYES NEGATIVE: 1
WHEEZING: 0
DIARRHEA: 0
SHORTNESS OF BREATH: 0
BACK PAIN: 0
NAUSEA: 0
STRIDOR: 0
ALLERGIC/IMMUNOLOGIC NEGATIVE: 1
CHEST TIGHTNESS: 0

## 2021-12-02 NOTE — PROGRESS NOTES
[facility]      Rojas Corpus         [id number]  12/2/2021   Chief Complaint   Patient presents with    Follow-up     ARTIS 1 YR FU WITH Mary Howell          PAP Download:   Original or initial AHI: 27.9     Date of initial study: 3/22/16      Compliant  97%     Noncompliant 3 %     PAP Type Air sens 10Level  10   Avg Hrs/Day 8 hours and 19 min  AHI: 1.3   Recorded compliance dates , 10/31/21  to 11/29/21   Machine/Mfg:   [x] ResMed    [] Respironics/Dreamstation   Interface:   [x] Nasal    [] Nasal pillows   [] FFM      Provider:      [] -CLYDE     []Kallie     [] Aylin    [x] Clarke Poster    [] Holley               [] [facility]      [] Adaptive    [] [address]      [] Other    Neck Size: 18  Mallampati Mallampati 4  ESS: 1  SAQLI: 90    Here is a scan of the most recent download:            Presentation:   Yoshi Yanes presents for sleep medicine follow up for obstructive sleep apnea  Since the last visit, Yoshi Yanes is doing well with PAP. She is sleeping well and feels rested. Mask is fitting well. Equipment issues: The pressure is  acceptable, the mask is acceptable     Sleep issues:  Do you feel better? Yes  More rested? Yes   Better concentration? yes    Progress History:   Since last visit any new medical issues? Yes TKA  New ER or hospital visits? No  Any new or changes in medicines? No  Any new sleep medicines? No    Review of Systems -   Review of Systems   Constitutional: Negative for activity change, appetite change, chills and fever. HENT: Negative for congestion and postnasal drip. Eyes: Negative. Respiratory: Negative for cough, chest tightness, shortness of breath, wheezing and stridor. Cardiovascular: Negative for chest pain and leg swelling. Gastrointestinal: Negative for diarrhea and nausea. Endocrine: Negative. Genitourinary: Negative. Musculoskeletal: Negative. Negative for arthralgias and back pain. Skin: Negative. recommended pressure to get optimal results and clinical improvement  - Recommend 7-9 hours of sleep with PAP  - She was advised to call [employer] company regarding supplies if needed.   -She call my office for earlier appointment if needed for worsening of sleep symptoms.   - She was instructed on weight loss  - Itzel Moeller was educated about my impression and plan. Patient verbalizesunderstanding.   We will see Itzel Orellana back in: 1 year with download    Information added by my medical assistant/LPN was reviewed today         Lord Tawanda PATTERSON, MPAS  12/2/2021

## 2022-02-09 ENCOUNTER — OFFICE VISIT (OUTPATIENT)
Dept: FAMILY MEDICINE CLINIC | Age: 77
End: 2022-02-09
Payer: MEDICARE

## 2022-02-09 VITALS
DIASTOLIC BLOOD PRESSURE: 70 MMHG | SYSTOLIC BLOOD PRESSURE: 124 MMHG | RESPIRATION RATE: 16 BRPM | WEIGHT: 264.5 LBS | HEART RATE: 68 BPM | BODY MASS INDEX: 41.43 KG/M2

## 2022-02-09 DIAGNOSIS — R68.83 CHILLS: ICD-10-CM

## 2022-02-09 DIAGNOSIS — M62.830 MUSCLE SPASM OF BACK: Primary | ICD-10-CM

## 2022-02-09 DIAGNOSIS — R68.89 COLD INTOLERANCE: ICD-10-CM

## 2022-02-09 PROCEDURE — 99213 OFFICE O/P EST LOW 20 MIN: CPT | Performed by: FAMILY MEDICINE

## 2022-02-09 RX ORDER — TIZANIDINE 4 MG/1
4 TABLET ORAL EVERY 8 HOURS PRN
Qty: 30 TABLET | Refills: 0 | Status: SHIPPED | OUTPATIENT
Start: 2022-02-09

## 2022-02-09 SDOH — ECONOMIC STABILITY: FOOD INSECURITY: WITHIN THE PAST 12 MONTHS, THE FOOD YOU BOUGHT JUST DIDN'T LAST AND YOU DIDN'T HAVE MONEY TO GET MORE.: NEVER TRUE

## 2022-02-09 SDOH — ECONOMIC STABILITY: FOOD INSECURITY: WITHIN THE PAST 12 MONTHS, YOU WORRIED THAT YOUR FOOD WOULD RUN OUT BEFORE YOU GOT MONEY TO BUY MORE.: NEVER TRUE

## 2022-02-09 ASSESSMENT — SOCIAL DETERMINANTS OF HEALTH (SDOH): HOW HARD IS IT FOR YOU TO PAY FOR THE VERY BASICS LIKE FOOD, HOUSING, MEDICAL CARE, AND HEATING?: NOT HARD AT ALL

## 2022-02-09 ASSESSMENT — ENCOUNTER SYMPTOMS
GASTROINTESTINAL NEGATIVE: 1
RESPIRATORY NEGATIVE: 1
BACK PAIN: 1

## 2022-02-09 NOTE — PROGRESS NOTES
Eva Orellana (:  1945) is a 77 y.o. female,Established patient, here for evaluation of the following chief complaint(s):  Back Pain (x 2 weeks in kidney area but now has resolved; now with muscle spasms in back) and Other (cold intolerance, requesting referral)        HPI:  Chief Complaint   Patient presents with    Back Pain     x 2 weeks in kidney area but now has resolved; now with muscle spasms in back    Other     cold intolerance, requesting referral     Pt c/o right sided back/flank pain for the last 2 weeks. Resolved at this time. Still with occasional muscle spasm in that region and is requesting a MR. Denies urinary symptoms. NKI. No rash. BMs regular. Would also like a referral for \"cold intolerance\" and chills. This is chronic in nature. She is euthyroid.     Lab Results   Component Value Date    TSH 0.566 2021           Patient Active Problem List   Diagnosis    Hypothyroid    Environmental allergies    Hyperlipidemia with target LDL less than 100    Post menopausal problems    Claustrophobia    Difficulty waking    Morbid obesity with BMI of 40.0-44.9, adult ([address]    ARTIS on CPAP    Insomnia     Past Surgical History:   Procedure Laterality Date    COLONOSCOPY  2007    Wisser    EYE SURGERY Right 2019    Cataract Surgery with Dr. Nicko Hannah Left 2019    Cataract Surgery with Dr. Shannan Luciano Left     Dr Lyndsay Capps Left 2018    COLONOSCOPY SCREENING performed by Yoko Sepulveda DO at [facility]     Social History     Tobacco Use    Smoking status: Former Smoker     Packs/day: 0.75     Years: 5.00     Pack years: 3.75     Types: Cigarettes     Quit date: 1996     Years since quittin.7    Smokeless tobacco: Never Used    Tobacco comment: quit    Substance Use Topics    Alcohol use: Yes     Comment: socially    Drug use: No     Prior to Admission medications Ear: Tympanic membrane normal.      Left Ear: Tympanic membrane normal.   Eyes:      Conjunctiva/sclera: Conjunctivae normal.   Cardiovascular:      Rate and Rhythm: Normal rate and regular rhythm. Heart sounds: Normal heart sounds. No murmur heard. Pulmonary:      Effort: Pulmonary effort is normal.      Breath sounds: Normal breath sounds. No wheezing, rhonchi or rales. Abdominal:      General: There is no distension. Musculoskeletal:      Cervical back: Neck supple. Skin:     General: Skin is warm and dry. Findings: No rash (on exposed surfaces). Neurological:      General: No focal deficit present. Mental Status: She is alert. Psychiatric:         Attention and Perception: Attention normal.         Mood and Affect: Mood normal.         Speech: Speech normal.         Behavior: Behavior normal. Behavior is cooperative. Thought Content: Thought content normal.         Judgment: Judgment normal.               ASSESSMENT/PLAN:  1. Muscle spasm of back  -     tiZANidine (ZANAFLEX) 4 MG tablet; Take 1 tablet by mouth every 8 hours as needed (muscle spasms), Disp-30 tablet, R-0Normal  2. Cold intolerance  3. Chills    -  Orders above  -  Uncertain etiology of #2, #3. She is euthyroid   -  Question hormonal, will follow up with [facility] in this regard    Return if symptoms worsen or fail to improve. An electronic signature was used to authenticate this note.     --Bing Mccord DO

## 2022-02-15 ENCOUNTER — PATIENT MESSAGE (OUTPATIENT)
Dept: FAMILY MEDICINE CLINIC | Age: 77
End: 2022-02-15

## 2022-02-15 RX ORDER — AMOXICILLIN AND CLAVULANATE POTASSIUM 875; 125 MG/1; MG/1
1 TABLET, FILM COATED ORAL 2 TIMES DAILY
Qty: 14 TABLET | Refills: 0 | Status: SHIPPED | OUTPATIENT
Start: 2022-02-15 | End: 2022-02-22

## 2022-02-15 NOTE — TELEPHONE ENCOUNTER
From: Lou Orellana  To: Dr. Sade Trivedi  Sent: 2/15/2022 1:18 PM EST  Subject: Sinus infection    Dr. Jorge Angeles------  Please know I do not want to bother you. .... BUT I have a sinus infection (same symptoms as in previous infections) headache, facial pain, low grade temp, no energy. I did a Covid at home test this morning and it was negative. I called my ENT in [facility] and she has no appointment until the end of April and has not seen me for several months, she suggested I ask you for help. If you can help, [facility] on [facility] is my pharmacy! Thank you. [email or web address]  [phone]

## 2022-02-22 ENCOUNTER — PATIENT MESSAGE (OUTPATIENT)
Dept: FAMILY MEDICINE CLINIC | Age: 77
End: 2022-02-22

## 2022-02-22 DIAGNOSIS — N23 KIDNEY PAIN: Primary | ICD-10-CM

## 2022-02-22 NOTE — TELEPHONE ENCOUNTER
From: Stevenson Orellana  To: Dr. Chey Reyes: 2/22/2022 11:28 AM EST  Subject: Blood work    Dr. Dima Argueta,  Please do not think I am a hypochondriac-----but I am still having kidney discomfort. We have a relationship with Dr. Cassie Howell and I called to see if he would see me. They want blood work and a referral. My insurance will cover the blood work. Please advise.   Cesar Merritt

## 2022-03-01 ENCOUNTER — NURSE ONLY (OUTPATIENT)
Dept: LAB | Age: 77
End: 2022-03-01

## 2022-03-01 DIAGNOSIS — N23 KIDNEY PAIN: ICD-10-CM

## 2022-03-01 LAB
ANION GAP SERPL CALCULATED.3IONS-SCNC: 10 MEQ/L (ref 8–16)
BASOPHILS # BLD: 1.1 %
BASOPHILS ABSOLUTE: 0.1 THOU/MM3 (ref 0–0.1)
BILIRUBIN URINE: NEGATIVE
BLOOD, URINE: NEGATIVE
BUN BLDV-MCNC: 13 MG/DL (ref 7–22)
CALCIUM SERPL-MCNC: 9.4 MG/DL (ref 8.5–10.5)
CHARACTER, URINE: CLEAR
CHLORIDE BLD-SCNC: 104 MEQ/L (ref 98–111)
CO2: 26 MEQ/L (ref 23–33)
COLOR: YELLOW
CREAT SERPL-MCNC: 0.6 MG/DL (ref 0.4–1.2)
EOSINOPHIL # BLD: 2 %
EOSINOPHILS ABSOLUTE: 0.1 THOU/MM3 (ref 0–0.4)
ERYTHROCYTE [DISTWIDTH] IN BLOOD BY AUTOMATED COUNT: 13.2 % (ref 11.5–14.5)
ERYTHROCYTE [DISTWIDTH] IN BLOOD BY AUTOMATED COUNT: 47.2 FL (ref 35–45)
GFR SERPL CREATININE-BSD FRML MDRD: > 90 ML/MIN/1.73M2
GLUCOSE BLD-MCNC: 96 MG/DL (ref 70–108)
GLUCOSE URINE: NEGATIVE MG/DL
HCT VFR BLD CALC: 45.3 % (ref 37–47)
HEMOGLOBIN: 14.7 GM/DL (ref 12–16)
IMMATURE GRANS (ABS): 0.01 THOU/MM3 (ref 0–0.07)
IMMATURE GRANULOCYTES: 0.2 %
KETONES, URINE: NEGATIVE
LEUKOCYTE ESTERASE, URINE: NEGATIVE
LYMPHOCYTES # BLD: 29.1 %
LYMPHOCYTES ABSOLUTE: 1.9 THOU/MM3 (ref 1–4.8)
MCH RBC QN AUTO: 31.9 PG (ref 26–33)
MCHC RBC AUTO-ENTMCNC: 32.5 GM/DL (ref 32.2–35.5)
MCV RBC AUTO: 98.3 FL (ref 81–99)
MONOCYTES # BLD: 10.3 %
MONOCYTES ABSOLUTE: 0.7 THOU/MM3 (ref 0.4–1.3)
NITRITE, URINE: NEGATIVE
NUCLEATED RED BLOOD CELLS: 0 /100 WBC
PH UA: 7 (ref 5–9)
PLATELET # BLD: 232 THOU/MM3 (ref 130–400)
PMV BLD AUTO: 11.3 FL (ref 9.4–12.4)
POTASSIUM SERPL-SCNC: 4.4 MEQ/L (ref 3.5–5.2)
PROTEIN UA: NEGATIVE
RBC # BLD: 4.61 MILL/MM3 (ref 4.2–5.4)
SEG NEUTROPHILS: 57.3 %
SEGMENTED NEUTROPHILS ABSOLUTE COUNT: 3.8 THOU/MM3 (ref 1.8–7.7)
SODIUM BLD-SCNC: 140 MEQ/L (ref 135–145)
SPECIFIC GRAVITY, URINE: 1.01 (ref 1–1.03)
UROBILINOGEN, URINE: 0.2 EU/DL (ref 0–1)
WBC # BLD: 6.6 THOU/MM3 (ref 4.8–10.8)

## 2022-03-04 NOTE — PROGRESS NOTES
[facility]   Date Of Service: 3/7/2022  Provider: Mark Zaidi DO, DO  Name: Estrellita Talbert   MRN: [id number]    Chief Complaint(s)      Chief Complaint   Patient presents with    New Patient     new referral- inflammatory arthritis    Other     general body aches and pain for age         History of Present illness (HPI)    Estrellita Talbert   is a(n)76 y.o. female with a hx of  has a past medical history of Allergic rhinitis, Chronic kidney disease, Claustrophobia, Hyperlipidemia, Obesity, and ARTIS on CPAP. referred by Mahsa Almonte DO for evaluation of  inflammatory arthritis     The patient reports pain affecting her  shoulder , knees, ankles, fet, left thumb and right index finger. Sx's started in the knees around 73 y. o.a diagnosed with osteoarthritis s/p eval and tx by orthopedics. S/p knee injections /visco-supplementation then bilat knee replacements. tx by pcp with NSAIDs resulting in lower ext swelling. Joint pain started slowly worsening around 68 y.o.a. Reports pain is variable, constant pain in the knees and ankles with intemrittent in the other joints. mainly assocaited with the weather. Lower extremities; Standing, walking  Hands: uknown. Shoulders - incativity. Back: bending, lifting, gardening. Alleviating: water aerobic, use of the shoulders. Timing: mornings: Current therapy: celebrex Previous therapy: bilateral knee replacement, viscosupplementation , Naproxen & celebrex (lower ext swelling)     AM stiffness lasting about 30 min, weakness of knees (has completed phsy therapy and continues to perform knee exercise). + dependnet lower ext edema. + gelling. -- Mother w/ arthritis and joint swelling   -- headaches - sinus , periorbital and \"top of the head\" , nasal sprays helping prevent the recurrent sinus infection. -- denies radicular pain, claudication sx's, incontinence.    -- occasional numbness in right arm mainly with driving for prolonged periods  -- scalp rash x 5 years, + itching.   -- rash below the panus x 18 month - mild relief with powder    Cancer screening: up to date      Review of Systems    Review of Systems   Constitutional: Positive for fatigue. Negative for diaphoresis and fever. HENT: Positive for sinus pressure and sinus pain (seasonal). Negative for congestion, hearing loss and nosebleeds. Eyes: Negative. Negative for pain and redness. + itching eyes   Respiratory: Negative for cough, shortness of breath and wheezing. Cardiovascular: Negative. Negative for chest pain. Gastrointestinal: Negative for constipation, diarrhea, nausea and vomiting. Genitourinary: Negative for difficulty urinating, frequency and hematuria. Musculoskeletal: Positive for myalgias. Negative for joint swelling. Skin: Negative for rash. Neurological: Positive for headaches. Negative for dizziness and weakness. Hematological: Does not bruise/bleed easily. Psychiatric/Behavioral: Negative for sleep disturbance. The patient is not nervous/anxious. PAST MEDICAL HISTORY     has a past medical history of Allergic rhinitis, Chronic kidney disease, Claustrophobia, Hyperlipidemia, Obesity, and ARTIS on CPAP. PAST SURGICAL HISTORY     has a past surgical history that includes Colonoscopy (2007); knee surgery (Left, 2016); pr colon ca scrn not hi rsk ind (Left, 1/8/2018); eye surgery (Right, 02/28/2019); and eye surgery (Left, 03/13/2019). FAMILY HISTORY      Family History   Problem Relation Age of Onset    Dementia Mother     Arthritis Mother     Diabetes Father        SOCIAL HISTORY     reports that she quit smoking about 25 years ago. Her smoking use included cigarettes. She has a 3.75 pack-year smoking history. She has never used smokeless tobacco. She reports current alcohol use. She reports that she does not use drugs.       ALLERGIES     Allergies   Allergen Reactions    Naproxen Swelling       CURRENT MEDICATIONS Current Outpatient Medications:     tiZANidine (ZANAFLEX) 4 MG tablet, Take 1 tablet by mouth every 8 hours as needed (muscle spasms), Disp: 30 tablet, Rfl: 0    augmented betamethasone dipropionate (DIPROLENE AF) 0.05 % cream, Apply topically BID., Disp: 50 g, Rfl: 2    SYNTHROID 150 MCG tablet, Take 1 tablet by mouth daily Take with water on an empty stomach- wait 30 minutes before eating or taking other meds. , Disp: 30 tablet, Rfl: 0    montelukast (SINGULAIR) 10 MG tablet, TAKE 1 TABLET NIGHTLY, Disp: 90 tablet, Rfl: 3    Lactobacillus (ACIDOPHILUS) 100 MG CAPS, Take by mouth, Disp: , Rfl:     triamcinolone (NASACORT ALLERGY 24HR) 55 MCG/ACT nasal inhaler, 2 sprays by Nasal route daily as needed, Disp: , Rfl:     vitamin B-12 (CYANOCOBALAMIN) 500 MCG tablet, Take 500 mcg by mouth daily Indications: 2500mg a day, Disp: , Rfl:     Niacin (VITAMIN B-3 PO), Take by mouth daily , Disp: , Rfl:     Magnesium 500 MG CAPS, Take by mouth daily , Disp: , Rfl:     vitamin E 400 UNIT capsule, Take 400 Units by mouth daily, Disp: , Rfl:     Cholecalciferol (VITAMIN D3) 11254 UNITS CAPS, Take 10,000 Units by mouth daily. , Disp: , Rfl:     therapeutic multivitamin-minerals (THERAGRAN-M) tablet, Take 1 tablet by mouth daily. , Disp: , Rfl:     PHYSICAL EXAMINATION / OBJECTIVE     Objective:  /68 (Site: Left Upper Arm, Position: Sitting, Cuff Size: Large Adult)   Pulse 91   Resp 16   Ht 5' 7\" (1.702 m)   Wt 268 lb (121.6 kg)   SpO2 96%   BMI 41.97 kg/m²     General Appearance:   alert and oriented to person, place and time well-developed and well nourished  Physch : appropriate affects ,   Head:  Normocephalic and atraumatic  Eyes: No gross abnormalities. ,  PERRL, Sclera nonicteric, conjunctiva non-INJECTED  ENT:  MMM,  no deformities , NO oral/nasal sores, Non-tender sinuses. Neck:  Neck supple, Non-tender, No  mass, thyromegaly,    Lymph:  No cervical  or  supraclavicular lymph node swelling. Pulmonary/Chest:  CTA bilat. , normal air movement, no respiratory distress  Cardiovascular:  Normal rate, + S1 and S2,  NO murmurs , rubs, gallups,     * edema  :  Deferred   Abd/GI: Deferred   Neurologic:  gait and coordination normal and speech normal  Skin:  Skin color and temp ,     + nail pitting bilat hand, + onycholysis   + redness posterio scalp, thickening of skin posterior left ear with scabbing. Extremities:  No clubbing ,     Musculoskeletal:       Upper extremities:    SHOULDERS  - limited AROM / PROM bilat , tender. Negative kisha, speed, hawking,  ,   ELBOWS , WRISTS Non-tender, Non-tender ,   HANDS/FINGERS   Cmc - squaring, + tender, + finkelstein test Left > rightMCPs mild fullness overlying the Right   DIPs heberden nodules right 2nd. Lower extremities:  HIPS  Tender bilat outer hips. + BLANCA, FADIR  KNEES  Tender bilat  ANKLES Non-tender    FEET : tender bilateral mid foot and MTPs. No synovitis. Spine:   C-spine, T-spine & L-spine:  Tender upper traps / periscapular spine, lumbosacral spine. + shober 10-13.75  , negativ  farbice, neg Occiput to wall , SLR/Cross SLR.                 KEY:  Tender :  T  Swelling: S  Non-tender : NT  No swelling: NS           RAPID 3  -- Composite Score MDHAQ (0-10) + Patient pain VAS (0-10): + Patient global assessment VAS (0-10):     3/7/2022 --- RAPID 3 : 1.7 + 4 + 1 = 6.7     Remission: <3  Low Disease Activity: <6  Moderate Disease Activity: >=6 and <=12  High Disease Activity: >12    LABS        CBC  Lab Results   Component Value Date    WBC 6.6 03/01/2022    RBC 4.61 03/01/2022    HGB 14.7 03/01/2022    HCT 45.3 03/01/2022    MCV 98.3 03/01/2022    MCH 31.9 03/01/2022    MCHC 32.5 03/01/2022    RDW 13.2 05/15/2018     03/01/2022       CMP  Lab Results   Component Value Date    CALCIUM 9.4 03/01/2022    LABALBU 4.3 05/03/2021    PROT 7.0 05/03/2021     03/01/2022    K 4.4 03/01/2022    CO2 26 03/01/2022     03/01/2022    BUN 13 03/01/2022    CREATININE 0.6 03/01/2022    ALKPHOS 71 05/03/2021    ALT 12 05/03/2021    AST 20 05/03/2021       HgBA1c: No components found for: HGBA1C    Lab Results   Component Value Date    TSH 0.566 11/18/2021     Lab Results   Component Value Date    VITD25 35 05/03/2021       Lab Results   Component Value Date    ANASCRN None Detected 05/03/2021       Lab Results   Component Value Date    RF < 10 05/03/2021       No components found for: CANCASCRN, APANCASCRN  Lab Results   Component Value Date    SEDRATE 42 (H) 05/03/2021     No results found for: CRP    RADIOLOGY:     LUMBAR SPINE 2 VIEWS:       CLINICAL INFORMATION: Sacroiliac pain       COMPARISON: No prior study.       TECHNIQUE: Standard AP and lateral views of lumbar spine were obtained.               Impression   1. Mild thoracolumbar levoscoliosis. Mild lumbar dextro scoliosis. Cannot assumed muscle spasm. Moderate degenerative vertebral body spondylosis scattered in the lumbar spine. 2. Multilevel moderate disc space narrowing and degenerative disc disease scattered in the lumbar spine. Mild antegrade spondylolisthesis L4 upon L5. Slight retrolisthesis L2 upon L3 and L3 upon L4.   3. No fracture is seen. Sacroiliac joints unremarkable.               **This report has been created using voice recognition software.  It may contain minor errors which are inherent in voice recognition technology. **       Final report electronically signed by Dr. Magali Brenner on 9/4/2019 4:27 PM         ASSESSMENT/PLAN      1. Polyarthralgia    2. Chronic bilateral low back pain without sciatica    3. Rash    4. Osteoarthritis of multiple joints, unspecified osteoarthritis type    5. Localized osteoarthritis of hand, unspecified laterality    6. Obesity, Class III, BMI 40-49.9 (morbid obesity) (HCC)        Polyarthralgia  Osteoarthritis of hands, knees and multiple joints    - shoulder, hands, hips, knees, ankles, neck and lower back.  Started in knees and hte progressed. Reported rash in scalp x 5 years, panus region x 18 months. + AM stiffness . Limited ROM of the hips and shoulder . Fullness of the MCPs but no synovitis. - based upon the skin finding and joint symptoms concern for possible PSA, will eval for RA & sjogre w/ CCP, SSA, SSb , crystalline arthritis w/ uric acid, mag, phos, iron studies. - x-ray of lumbar spine , SI joints and Right hand has been ordered. Chronic low back pain with inflammatory features (stiffness)     Rash: scalp , posterior ear, panus w/ nail changes (pitting an onycholysis ) raises the concern for PsA   - referral to dermatology . 1. Polyarthralgia  2. Chronic bilateral low back pain without sciatica -- DDD, djd and spondylosis on x-ray of SI joint     - CBC with Auto Differential; Future  - Comprehensive Metabolic Panel; Future  - Sedimentation Rate; Future  - C-Reactive Protein; Future  - Magnesium; Future  - Phosphorus; Future  - Uric Acid; Future  - Electrophoresis Protein, Serum with Reflex to Immunofixation; Future  - Cyclic Citrul Peptide Antibody, IgG; Future  - Anti SSB; Future  - Anti SSA; Future  - XR SHOULDER RIGHT (MIN 2 VIEWS); Future  - XR HAND RIGHT (MIN 3 VIEWS); Future  - XR LUMBAR SPINE (2-3 VIEWS); Future  - XR SACROILIAC JOINTS (MIN 3 VIEWS); Future  - XR CHEST (2 VW); Future    3. Rash  - External Referral To Dermatology    4. Osteoarthritis of multiple joints, unspecified osteoarthritis type    - CBC with Auto Differential; Future  - Comprehensive Metabolic Panel; Future  - Sedimentation Rate; Future  - C-Reactive Protein; Future  - Magnesium; Future  - Phosphorus; Future  - Uric Acid; Future  - Electrophoresis Protein, Serum with Reflex to Immunofixation; Future  - Cyclic Citrul Peptide Antibody, IgG; Future  - Anti SSB; Future  - Anti SSA; Future  - XR SHOULDER RIGHT (MIN 2 VIEWS); Future  - XR HAND RIGHT (MIN 3 VIEWS); Future  - XR LUMBAR SPINE (2-3 VIEWS);  Future  - XR SACROILIAC JOINTS (MIN 3 VIEWS); Future  - XR CHEST (2 VW); Future    5. Localized osteoarthritis of hand, unspecified laterality  - CBC with Auto Differential; Future  - Comprehensive Metabolic Panel; Future  - Sedimentation Rate; Future  - C-Reactive Protein; Future  - Magnesium; Future  - Phosphorus; Future  - Uric Acid; Future  - Electrophoresis Protein, Serum with Reflex to Immunofixation; Future  - Cyclic Citrul Peptide Antibody, IgG; Future  - Anti SSB; Future  - Anti SSA; Future  - XR SHOULDER RIGHT (MIN 2 VIEWS); Future  - XR HAND RIGHT (MIN 3 VIEWS); Future  - XR LUMBAR SPINE (2-3 VIEWS); Future  - XR SACROILIAC JOINTS (MIN 3 VIEWS); Future  - XR CHEST (2 VW); Future    6. Obesity, Class III, BMI 40-49.9 (morbid obesity) ([facility])              No follow-ups on file. Electronically signed by De Palacios DO on 3/7/2022 at 9:34 AM    New Prescriptions    No medications on file       3/7/2022       The risks and benefits of my recommendations, as well as other treatment options, benefits and side effects were discussed with the patient today. Questions were answered. Thank you for allowing me to participate in the care of this patient. Please call if there are any questions.

## 2022-03-07 ENCOUNTER — HOSPITAL ENCOUNTER (OUTPATIENT)
Age: 77
Discharge: HOME | End: 2022-03-07
Payer: MEDICARE

## 2022-03-07 ENCOUNTER — HOSPITAL ENCOUNTER (OUTPATIENT)
Dept: GENERAL RADIOLOGY | Age: 77
Discharge: HOME | End: 2022-03-07
Payer: MEDICARE

## 2022-03-07 ENCOUNTER — OFFICE VISIT (OUTPATIENT)
Dept: RHEUMATOLOGY | Age: 77
End: 2022-03-07
Payer: MEDICARE

## 2022-03-07 VITALS
SYSTOLIC BLOOD PRESSURE: 128 MMHG | HEIGHT: 67 IN | OXYGEN SATURATION: 96 % | WEIGHT: 268 LBS | BODY MASS INDEX: 42.06 KG/M2 | RESPIRATION RATE: 16 BRPM | DIASTOLIC BLOOD PRESSURE: 68 MMHG | HEART RATE: 91 BPM

## 2022-03-07 DIAGNOSIS — M15.9 OSTEOARTHRITIS OF MULTIPLE JOINTS, UNSPECIFIED OSTEOARTHRITIS TYPE: ICD-10-CM

## 2022-03-07 DIAGNOSIS — G89.29 CHRONIC BILATERAL LOW BACK PAIN WITHOUT SCIATICA: ICD-10-CM

## 2022-03-07 DIAGNOSIS — M25.50 POLYARTHRALGIA: ICD-10-CM

## 2022-03-07 DIAGNOSIS — M54.50 CHRONIC BILATERAL LOW BACK PAIN WITHOUT SCIATICA: ICD-10-CM

## 2022-03-07 DIAGNOSIS — M19.049 LOCALIZED OSTEOARTHRITIS OF HAND, UNSPECIFIED LATERALITY: ICD-10-CM

## 2022-03-07 DIAGNOSIS — M25.50 POLYARTHRALGIA: Primary | ICD-10-CM

## 2022-03-07 DIAGNOSIS — E66.01 OBESITY, CLASS III, BMI 40-49.9 (MORBID OBESITY) (HCC): ICD-10-CM

## 2022-03-07 DIAGNOSIS — R21 RASH: ICD-10-CM

## 2022-03-07 PROCEDURE — 72100 X-RAY EXAM L-S SPINE 2/3 VWS: CPT

## 2022-03-07 PROCEDURE — 99204 OFFICE O/P NEW MOD 45 MIN: CPT | Performed by: INTERNAL MEDICINE

## 2022-03-07 PROCEDURE — 71046 X-RAY EXAM CHEST 2 VIEWS: CPT

## 2022-03-07 PROCEDURE — 73130 X-RAY EXAM OF HAND: CPT

## 2022-03-07 PROCEDURE — 72202 X-RAY EXAM SI JOINTS 3/> VWS: CPT

## 2022-03-07 PROCEDURE — 73030 X-RAY EXAM OF SHOULDER: CPT

## 2022-03-07 RX ORDER — PREDNISONE 10 MG/1
TABLET ORAL
Qty: 15 TABLET | Refills: 0 | Status: SHIPPED | OUTPATIENT
Start: 2022-03-07 | End: 2022-03-17

## 2022-03-07 ASSESSMENT — ENCOUNTER SYMPTOMS
EYE REDNESS: 0
CONSTIPATION: 0
EYE PAIN: 0
SINUS PAIN: 1
COUGH: 0
SHORTNESS OF BREATH: 0
SINUS PRESSURE: 1
WHEEZING: 0
EYES NEGATIVE: 1
VOMITING: 0
DIARRHEA: 0
NAUSEA: 0

## 2022-03-08 ENCOUNTER — NURSE ONLY (OUTPATIENT)
Dept: LAB | Age: 77
End: 2022-03-08

## 2022-03-08 DIAGNOSIS — G89.29 CHRONIC BILATERAL LOW BACK PAIN WITHOUT SCIATICA: ICD-10-CM

## 2022-03-08 DIAGNOSIS — M19.049 LOCALIZED OSTEOARTHRITIS OF HAND, UNSPECIFIED LATERALITY: ICD-10-CM

## 2022-03-08 DIAGNOSIS — M25.50 POLYARTHRALGIA: ICD-10-CM

## 2022-03-08 DIAGNOSIS — M54.50 CHRONIC BILATERAL LOW BACK PAIN WITHOUT SCIATICA: ICD-10-CM

## 2022-03-08 DIAGNOSIS — M15.9 OSTEOARTHRITIS OF MULTIPLE JOINTS, UNSPECIFIED OSTEOARTHRITIS TYPE: ICD-10-CM

## 2022-03-08 LAB
BASOPHILS # BLD: 0.9 %
BASOPHILS ABSOLUTE: 0.1 THOU/MM3 (ref 0–0.1)
EOSINOPHIL # BLD: 2.2 %
EOSINOPHILS ABSOLUTE: 0.1 THOU/MM3 (ref 0–0.4)
ERYTHROCYTE [DISTWIDTH] IN BLOOD BY AUTOMATED COUNT: 12.8 % (ref 11.5–14.5)
ERYTHROCYTE [DISTWIDTH] IN BLOOD BY AUTOMATED COUNT: 45.7 FL (ref 35–45)
HCT VFR BLD CALC: 44.6 % (ref 37–47)
HEMOGLOBIN: 14.7 GM/DL (ref 12–16)
IMMATURE GRANS (ABS): 0.02 THOU/MM3 (ref 0–0.07)
IMMATURE GRANULOCYTES: 0.3 %
LYMPHOCYTES # BLD: 31.3 %
LYMPHOCYTES ABSOLUTE: 2 THOU/MM3 (ref 1–4.8)
MCH RBC QN AUTO: 31.7 PG (ref 26–33)
MCHC RBC AUTO-ENTMCNC: 33 GM/DL (ref 32.2–35.5)
MCV RBC AUTO: 96.3 FL (ref 81–99)
MONOCYTES # BLD: 10.9 %
MONOCYTES ABSOLUTE: 0.7 THOU/MM3 (ref 0.4–1.3)
NUCLEATED RED BLOOD CELLS: 0 /100 WBC
PLATELET # BLD: 238 THOU/MM3 (ref 130–400)
PMV BLD AUTO: 10.8 FL (ref 9.4–12.4)
RBC # BLD: 4.63 MILL/MM3 (ref 4.2–5.4)
SEDIMENTATION RATE, ERYTHROCYTE: 33 MM/HR (ref 0–20)
SEG NEUTROPHILS: 54.4 %
SEGMENTED NEUTROPHILS ABSOLUTE COUNT: 3.5 THOU/MM3 (ref 1.8–7.7)
WBC # BLD: 6.4 THOU/MM3 (ref 4.8–10.8)

## 2022-03-09 LAB
ALBUMIN SERPL-MCNC: 4.3 G/DL (ref 3.5–5.1)
ALP BLD-CCNC: 56 U/L (ref 38–126)
ALT SERPL-CCNC: 13 U/L (ref 11–66)
ANION GAP SERPL CALCULATED.3IONS-SCNC: 12 MEQ/L (ref 8–16)
AST SERPL-CCNC: 19 U/L (ref 5–40)
BILIRUB SERPL-MCNC: 0.4 MG/DL (ref 0.3–1.2)
BUN BLDV-MCNC: 12 MG/DL (ref 7–22)
C-REACTIVE PROTEIN: 0.76 MG/DL (ref 0–1)
CALCIUM SERPL-MCNC: 9.8 MG/DL (ref 8.5–10.5)
CHLORIDE BLD-SCNC: 104 MEQ/L (ref 98–111)
CO2: 26 MEQ/L (ref 23–33)
CREAT SERPL-MCNC: 0.6 MG/DL (ref 0.4–1.2)
GFR SERPL CREATININE-BSD FRML MDRD: > 90 ML/MIN/1.73M2
GLUCOSE BLD-MCNC: 91 MG/DL (ref 70–108)
MAGNESIUM: 2.2 MG/DL (ref 1.6–2.4)
PHOSPHORUS: 4 MG/DL (ref 2.4–4.7)
POTASSIUM SERPL-SCNC: 4.3 MEQ/L (ref 3.5–5.2)
SODIUM BLD-SCNC: 142 MEQ/L (ref 135–145)
TOTAL PROTEIN: 6.9 G/DL (ref 6.1–8)
URIC ACID: 5.7 MG/DL (ref 2.4–5.7)

## 2022-03-10 LAB — CYCLIC CITRULLINATED PEPTIDE ANTIBODY IGG: 1.7 U/ML (ref 0–7)

## 2022-03-11 LAB
ANTI SSA: NORMAL
ANTI SSB: 0 AU/ML (ref 0–40)

## 2022-03-12 LAB — MISC. #1 REFERENCE GROUP TEST: ABNORMAL

## 2022-04-28 ENCOUNTER — TELEPHONE (OUTPATIENT)
Dept: PULMONOLOGY | Age: 77
End: 2022-04-28

## 2022-04-28 NOTE — TELEPHONE ENCOUNTER
Patient called into the office today. She states she is unable to view any of the information from her machine. She called [facility] and apparently Alesia Reyes told her to get a new machine order from us. Patient states her machine does work fine but she cannot view any of her information from it. Please advise.

## 2022-04-28 NOTE — TELEPHONE ENCOUNTER
I just called and spoke with Gumaro Andre. At this point given the huge shortage of CPAP, as long as her PAP is still working she should continue using it. Once the shortage is over we will consider replacing these PAPs. We are working on getting the software to the downloads in the office so she will be able to bring her card here for her office visit. Most other CPAP companies do not have cloud capability or an jaspreet.

## 2022-05-02 DIAGNOSIS — E03.9 HYPOTHYROIDISM, UNSPECIFIED TYPE: ICD-10-CM

## 2022-05-02 RX ORDER — LEVOTHYROXINE SODIUM 150 MCG
150 TABLET ORAL DAILY
Qty: 90 TABLET | Refills: 3 | Status: SHIPPED | OUTPATIENT
Start: 2022-05-02

## 2022-05-02 RX ORDER — LEVOTHYROXINE SODIUM 150 MCG
150 TABLET ORAL DAILY
Qty: 30 TABLET | Refills: 0 | Status: SHIPPED | OUTPATIENT
Start: 2022-05-02 | End: 2022-05-02 | Stop reason: SDUPTHER

## 2022-05-16 ENCOUNTER — TELEPHONE (OUTPATIENT)
Dept: FAMILY MEDICINE CLINIC | Age: 77
End: 2022-05-16

## 2022-05-16 NOTE — TELEPHONE ENCOUNTER
Received a call from [facility] with [facility] stating that they received the TED script for Synthroid and that the patients co-pay will be $147. Ernestene Fleischer states that if the Rabia Jackson was removed that they have a program that the patient will be listed as generic but they are filling with name brand with the generic co-pay. Spoke with Dr. Ulysses Avena and he gave the VO to take the TED off, updated Ernestene Fleischer and she will do the paperwork and send the name brand to the patient. Attempted to notify the patient, left a VM asking her to call the office back to be updated.

## 2022-06-03 ENCOUNTER — NURSE ONLY (OUTPATIENT)
Dept: LAB | Age: 77
End: 2022-06-03

## 2022-06-03 LAB
ALBUMIN SERPL-MCNC: 4.2 G/DL (ref 3.5–5.1)
ALP BLD-CCNC: 60 U/L (ref 38–126)
ALT SERPL-CCNC: 11 U/L (ref 11–66)
ANION GAP SERPL CALCULATED.3IONS-SCNC: 12 MEQ/L (ref 8–16)
AST SERPL-CCNC: 17 U/L (ref 5–40)
AVERAGE GLUCOSE: 111 MG/DL (ref 70–126)
BILIRUB SERPL-MCNC: 0.5 MG/DL (ref 0.3–1.2)
BUN BLDV-MCNC: 14 MG/DL (ref 7–22)
CALCIUM SERPL-MCNC: 9.2 MG/DL (ref 8.5–10.5)
CHLORIDE BLD-SCNC: 103 MEQ/L (ref 98–111)
CO2: 25 MEQ/L (ref 23–33)
CREAT SERPL-MCNC: 0.6 MG/DL (ref 0.4–1.2)
ERYTHROCYTE [DISTWIDTH] IN BLOOD BY AUTOMATED COUNT: 12.9 % (ref 11.5–14.5)
ERYTHROCYTE [DISTWIDTH] IN BLOOD BY AUTOMATED COUNT: 46.5 FL (ref 35–45)
GFR SERPL CREATININE-BSD FRML MDRD: > 90 ML/MIN/1.73M2
GLUCOSE BLD-MCNC: 90 MG/DL (ref 70–108)
HBA1C MFR BLD: 5.7 % (ref 4.4–6.4)
HCT VFR BLD CALC: 46.3 % (ref 37–47)
HEMOGLOBIN: 14.7 GM/DL (ref 12–16)
INSULIN, RANDOM OR FASTING: 27.1 MU/L
MCH RBC QN AUTO: 31.1 PG (ref 26–33)
MCHC RBC AUTO-ENTMCNC: 31.7 GM/DL (ref 32.2–35.5)
MCV RBC AUTO: 97.9 FL (ref 81–99)
PLATELET # BLD: 240 THOU/MM3 (ref 130–400)
PMV BLD AUTO: 11.4 FL (ref 9.4–12.4)
POTASSIUM SERPL-SCNC: 4.4 MEQ/L (ref 3.5–5.2)
RBC # BLD: 4.73 MILL/MM3 (ref 4.2–5.4)
SODIUM BLD-SCNC: 140 MEQ/L (ref 135–145)
TOTAL PROTEIN: 6.5 G/DL (ref 6.1–8)
TSH SERPL DL<=0.05 MIU/L-ACNC: 1.07 UIU/ML (ref 0.4–4.2)
WBC # BLD: 8.3 THOU/MM3 (ref 4.8–10.8)

## 2022-06-29 ENCOUNTER — OFFICE VISIT (OUTPATIENT)
Dept: CARDIOLOGY CLINIC | Age: 77
End: 2022-06-29
Payer: MEDICARE

## 2022-06-29 VITALS
HEIGHT: 67 IN | SYSTOLIC BLOOD PRESSURE: 142 MMHG | HEART RATE: 72 BPM | DIASTOLIC BLOOD PRESSURE: 76 MMHG | BODY MASS INDEX: 41.21 KG/M2 | WEIGHT: 262.6 LBS

## 2022-06-29 DIAGNOSIS — R06.02 SOB (SHORTNESS OF BREATH): Primary | ICD-10-CM

## 2022-06-29 DIAGNOSIS — I10 ESSENTIAL HYPERTENSION: ICD-10-CM

## 2022-06-29 PROCEDURE — 93000 ELECTROCARDIOGRAM COMPLETE: CPT | Performed by: NUCLEAR MEDICINE

## 2022-06-29 PROCEDURE — 1123F ACP DISCUSS/DSCN MKR DOCD: CPT | Performed by: NUCLEAR MEDICINE

## 2022-06-29 PROCEDURE — 99213 OFFICE O/P EST LOW 20 MIN: CPT | Performed by: NUCLEAR MEDICINE

## 2022-06-29 NOTE — PROGRESS NOTES
[facility] [facility] [address]  [place] [address]  Dept: [phone]  Dept Fax: [phone]  Loc: [phone]    Visit Date: 2022    Marjorie Muhammad is a 77 y.o. female who presents todayfor:  Chief Complaint   Patient presents with    1 Year Follow Up    Hypertension   risk for CAD  Borderline dyspnea  Borderline HTN  No changes in breathing  No chest pain   No dizziness  No syncope        HPI:  HPI  Past Medical History:   Diagnosis Date    Allergic rhinitis     Chronic kidney disease     Claustrophobia 2016    Hyperlipidemia     Obesity     ARTIS on CPAP       Past Surgical History:   Procedure Laterality Date    COLONOSCOPY  2007    [facility]isser    EYE SURGERY Right 2019    Cataract Surgery with Dr. Josué Mejia Left 2019    Cataract Surgery with Dr. Jose Pena Left     Dr Ibis Mckeon  [facility]    COLONOSCOPY SCREENING performed by Ai Clifton DO at [facility]     Family History   Problem Relation Age of Onset    Dementia Mother     Arthritis Mother     Diabetes Father      Social History     Tobacco Use    Smoking status: Former Smoker     Packs/day: 0.75     Years: 5.00     Pack years: 3.75     Types: Cigarettes     Quit date: 1996     Years since quittin.1    Smokeless tobacco: Never Used    Tobacco comment: quit    Substance Use Topics    Alcohol use: Yes     Comment: socially      Current Outpatient Medications   Medication Sig Dispense Refill    SYNTHROID 150 MCG tablet Take 1 tablet by mouth daily Take with water on an empty stomach- wait 30 minutes before eating or taking other meds. 90 tablet 3    tiZANidine (ZANAFLEX) 4 MG tablet Take 1 tablet by mouth every 8 hours as needed (muscle spasms) 30 tablet 0    augmented betamethasone dipropionate (DIPROLENE AF) 0.05 % cream Apply topically BID.  50 g 2    montelukast (SINGULAIR) 10 MG tablet TAKE 1 TABLET NIGHTLY 90 tablet 3    Lactobacillus (ACIDOPHILUS) 100 MG CAPS Take by mouth      triamcinolone (NASACORT ALLERGY 24HR) 55 MCG/ACT nasal inhaler 2 sprays by Nasal route daily as needed      vitamin B-12 (CYANOCOBALAMIN) 500 MCG tablet Take 500 mcg by mouth daily Indications: 2500mg a day      Niacin (VITAMIN B-3 PO) Take by mouth daily       Magnesium 500 MG CAPS Take by mouth daily       vitamin E 400 UNIT capsule Take 400 Units by mouth daily      Cholecalciferol (VITAMIN D3) 60457 UNITS CAPS Take 10,000 Units by mouth daily.  therapeutic multivitamin-minerals (THERAGRAN-M) tablet Take 1 tablet by mouth daily. No current facility-administered medications for this visit.      Allergies   Allergen Reactions    Naproxen Swelling     Health Maintenance   Topic Date Due    Hepatitis C screen  Never done    Shingles vaccine (3 of 3) 06/17/2022    Depression Screen  08/19/2022    Annual Wellness Visit (AWV)  08/20/2022    A1C test (Diabetic or Prediabetic)  06/03/2023    Lipids  08/15/2025    Colorectal Cancer Screen  01/08/2028    DTaP/Tdap/Td vaccine (2 - Td or Tdap) 02/25/2030    Flu vaccine  Completed    Pneumococcal 65+ years Vaccine  Completed    COVID-19 Vaccine  Completed    DEXA (modify frequency per FRAX score)  Addressed    Hepatitis A vaccine  Aged Out    Hepatitis B vaccine  Aged Out    Hib vaccine  Aged Out    Meningococcal (ACWY) vaccine  Aged Out       Subjective:  Review of Systems  General:   No fever, no chills, No fatigue or weight loss  Pulmonary:    No dyspnea, no wheezing  Cardiac:    Denies recent chest pain,   GI:     No nausea or vomiting, no abdominal pain  Neuro:    No dizziness or light headedness,   Musculoskeletal:  No recent active issues  Extremities:   No edema, no obvious claudication       Objective:  Physical Exam  BP (!) 142/76   Pulse 72   Ht 5' 7\" (1.702 m)   Wt 262 lb 9.6 oz (119.1 kg)   BMI 41.13 kg/m²   General:   Well developed, well nourished  Lungs:   Clear to auscultation  Heart:    Normal S1 S2, Slight murmur. no rubs, no gallops  Abdomen:   Soft, non tender, no organomegalies, positive bowel sounds  Extremities:   No edema, no cyanosis, good peripheral pulses  Neurological:   Awake, alert, oriented. No obvious focal deficits  Musculoskelatal:  No obvious deformities    Assessment:      Diagnosis Orders   1. SOB (shortness of breath)  EKG 12 lead   2. Essential hypertension  EKG 12 lead   as above  Cardiac fair for now   ECG in office was done today. I reviewed the ECG. No acute findings      Plan:  Return in about 1 year (around 6/29/2023). As above  Continue risk factor modification and medical management  Thank you for allowing me to participate in the care of your patient. Please don't hesitate to contact me regarding any further issues related to the patient care    Orders Placed:  Orders Placed This Encounter   Procedures    EKG 12 lead     Order Specific Question:   Reason for Exam?     Answer: Other       Medications Prescribed:  No orders of the defined types were placed in this encounter. Discussed use, benefit, and side effects of prescribed medications. All patient questions answered. Pt voicedunderstanding. Instructed to continue current medications, diet and exercise. Continue risk factor modification and medical management. Patient agreed with treatment plan. Follow up as directed.     Electronically signedby Kendra Michael MD on 6/29/2022 at 1:14 PM

## 2022-07-12 ENCOUNTER — TELEPHONE (OUTPATIENT)
Dept: PULMONOLOGY | Age: 77
End: 2022-07-12

## 2022-07-12 DIAGNOSIS — Z99.89 OSA ON CPAP: Primary | ICD-10-CM

## 2022-07-12 DIAGNOSIS — G47.33 OSA ON CPAP: Primary | ICD-10-CM

## 2022-07-24 ENCOUNTER — HOSPITAL ENCOUNTER (EMERGENCY)
Age: 77
Discharge: HOME | End: 2022-07-24
Payer: MEDICARE

## 2022-07-24 VITALS
HEART RATE: 87 BPM | BODY MASS INDEX: 40.81 KG/M2 | HEIGHT: 67 IN | OXYGEN SATURATION: 97 % | SYSTOLIC BLOOD PRESSURE: 134 MMHG | TEMPERATURE: 98.1 F | DIASTOLIC BLOOD PRESSURE: 81 MMHG | WEIGHT: 260 LBS | RESPIRATION RATE: 18 BRPM

## 2022-07-24 DIAGNOSIS — U07.1 COVID-19: Primary | ICD-10-CM

## 2022-07-24 LAB — SARS-COV-2, NAA: DETECTED

## 2022-07-24 PROCEDURE — 87635 SARS-COV-2 COVID-19 AMP PRB: CPT

## 2022-07-24 PROCEDURE — 99213 OFFICE O/P EST LOW 20 MIN: CPT

## 2022-07-24 PROCEDURE — 99214 OFFICE O/P EST MOD 30 MIN: CPT | Performed by: NURSE PRACTITIONER

## 2022-07-24 ASSESSMENT — ENCOUNTER SYMPTOMS
EYE REDNESS: 0
ALLERGIC/IMMUNOLOGIC NEGATIVE: 1
SHORTNESS OF BREATH: 0
CHEST TIGHTNESS: 0
COUGH: 1
RHINORRHEA: 1
SINUS PRESSURE: 1
WHEEZING: 0
SINUS PAIN: 1
EYE PAIN: 0
PHOTOPHOBIA: 0
SORE THROAT: 1
GASTROINTESTINAL NEGATIVE: 1

## 2022-07-24 ASSESSMENT — PAIN DESCRIPTION - LOCATION: LOCATION: HEAD

## 2022-07-24 ASSESSMENT — PAIN DESCRIPTION - DESCRIPTORS: DESCRIPTORS: ACHING

## 2022-07-24 ASSESSMENT — PAIN SCALES - GENERAL: PAINLEVEL_OUTOF10: 9

## 2022-07-24 ASSESSMENT — PAIN - FUNCTIONAL ASSESSMENT: PAIN_FUNCTIONAL_ASSESSMENT: 0-10

## 2022-07-24 NOTE — ED PROVIDER NOTES
[address]       Chief Complaint   Patient presents with    Headache    Fever       Nurses Notes reviewed and I agree except as noted in the HPI. HISTORY OF PRESENT ILLNESS   Naz Gonzales is a 69 y.o. female who presents The history is provided by the patient. URI  Presenting symptoms: congestion, cough, fatigue, fever, rhinorrhea and sore throat    Congestion:     Location:  Nasal    Interferes with sleep: yes      Interferes with eating/drinking: yes    Fever:     Duration:  2 days    Timing:  Intermittent    Max temp prior to arrival:  101    Temp source:  Subjective and oral    Progression:  Worsening  Severity:  Moderate  Onset quality:  Sudden  Duration:  1 day  Timing:  Intermittent  Progression:  Worsening  Chronicity:  New  Relieved by:  Nothing  Worsened by:  Drinking, eating and movement  Ineffective treatments:  OTC medications, rest, hot fluids and drinking  Associated symptoms: arthralgias, headaches, myalgias and sinus pain    Associated symptoms: no wheezing    Risk factors: being elderly, recent illness and sick contacts        REVIEW OF SYSTEMS     Review of Systems   Constitutional:  Positive for activity change, appetite change, fatigue and fever. HENT:  Positive for congestion, rhinorrhea, sinus pressure, sinus pain and sore throat. Eyes:  Negative for photophobia, pain and redness. Respiratory:  Positive for cough. Negative for chest tightness, shortness of breath and wheezing. Cardiovascular:  Negative for chest pain and leg swelling. Gastrointestinal: Negative. Endocrine: Negative for cold intolerance and heat intolerance. Genitourinary: Negative. Musculoskeletal:  Positive for arthralgias and myalgias. Skin: Negative. Allergic/Immunologic: Negative. Neurological:  Positive for headaches. Hematological:  Negative for adenopathy. Does not bruise/bleed easily.    Psychiatric/Behavioral: Negative. PAST MEDICAL HISTORY         Diagnosis Date    Allergic rhinitis     Chronic kidney disease     Claustrophobia 2/24/2016    Hyperlipidemia     Obesity     ARTIS on CPAP        SURGICAL HISTORY     Patient  has a past surgical history that includes Colonoscopy (2007); knee surgery (Left, 2016); pr colon ca scrn not hi rsk ind (Left, 1/8/2018); eye surgery (Right, 02/28/2019); and eye surgery (Left, 03/13/2019). CURRENT MEDICATIONS       Previous Medications    AUGMENTED BETAMETHASONE DIPROPIONATE (DIPROLENE AF) 0.05 % CREAM    Apply topically BID. CHOLECALCIFEROL (VITAMIN D3) 72232 UNITS CAPS    Take 10,000 Units by mouth daily. LACTOBACILLUS (ACIDOPHILUS) 100 MG CAPS    Take by mouth    MAGNESIUM 500 MG CAPS    Take by mouth daily     MONTELUKAST (SINGULAIR) 10 MG TABLET    TAKE 1 TABLET NIGHTLY    NIACIN (VITAMIN B-3 PO)    Take by mouth daily     SYNTHROID 150 MCG TABLET    Take 1 tablet by mouth daily Take with water on an empty stomach- wait 30 minutes before eating or taking other meds. THERAPEUTIC MULTIVITAMIN-MINERALS (THERAGRAN-M) TABLET    Take 1 tablet by mouth daily. TIZANIDINE (ZANAFLEX) 4 MG TABLET    Take 1 tablet by mouth every 8 hours as needed (muscle spasms)    TRIAMCINOLONE (NASACORT) 55 MCG/ACT NASAL INHALER    2 sprays by Nasal route daily as needed    VITAMIN B-12 (CYANOCOBALAMIN) 500 MCG TABLET    Take 500 mcg by mouth daily Indications: 2500mg a day    VITAMIN E 400 UNIT CAPSULE    Take 400 Units by mouth daily       ALLERGIES     Patient is is allergic to naproxen. FAMILY HISTORY     Patient'sfamily history includes Arthritis in her mother; Dementia in her mother; Diabetes in her father. SOCIAL HISTORY     Patient  reports that she quit smoking about 26 years ago. Her smoking use included cigarettes. She has a 3.75 pack-year smoking history. She has never used smokeless tobacco. She reports current alcohol use.  She reports that she does not use drugs.    PHYSICAL EXAM     ED TRIAGE VITALS  BP: 134/81, Temp: 98.1 °F (36.7 °C), Heart Rate: 87, Resp: 18, SpO2: 97 %  Physical Exam  Vitals and nursing note reviewed. Constitutional:       Appearance: She is normal weight. She is ill-appearing. HENT:      Head: Normocephalic. Right Ear: Ear canal and external ear normal.      Left Ear: Ear canal and external ear normal.      Nose: Congestion and rhinorrhea present. Mouth/Throat:      Mouth: Mucous membranes are dry. Pharynx: Posterior oropharyngeal erythema present. Eyes:      Conjunctiva/sclera: Conjunctivae normal.   Cardiovascular:      Rate and Rhythm: Regular rhythm. Tachycardia present. Pulses: Normal pulses. Heart sounds: Normal heart sounds. Pulmonary:      Effort: Pulmonary effort is normal.      Breath sounds: No wheezing, rhonchi or rales. Abdominal:      General: Abdomen is flat. Palpations: Abdomen is soft. Musculoskeletal:         General: Normal range of motion. Cervical back: Normal range of motion and neck supple. Tenderness present. Lymphadenopathy:      Cervical: No cervical adenopathy. Skin:     General: Skin is warm and dry. Capillary Refill: Capillary refill takes less than 2 seconds. Coloration: Skin is not pale. Neurological:      General: No focal deficit present. Mental Status: She is alert and oriented to person, place, and time. Mental status is at baseline. Psychiatric:         Mood and Affect: Mood normal.         Behavior: Behavior normal.         Thought Content:  Thought content normal.       DIAGNOSTIC RESULTS   Labs:   Results for orders placed or performed during the hospital encounter of 07/24/22   COVID-19, Rapid   Result Value Ref Range    SARS-CoV-2, KOFFI DETECTED (AA) NOT DETECTED       IMAGING:  No orders to display     URGENT CARE COURSE:     Vitals:    07/24/22 1055   BP: 134/81   Pulse: 87   Resp: 18   Temp: 98.1 °F (36.7 °C)   TempSrc: Temporal SpO2: 97%   Weight: 260 lb (117.9 kg)   Height: 5' 7\" (1.702 m)       Medications - No data to display  PROCEDURES:  None  FINALIMPRESSION    I have reviewed the patient's medical history in detail and updated the computerized patient record. HPI/ROS per the patient and caregiver. Overall non toxic in appearance. Answers questions appropriately. Conditions discussed and addressed this visit include:   Patient appears ill but non-toxic and well hydrated. She has been managing symptoms at home with left over tramadol she has from a previous surgery and tylenol. She is well hydrated. No acute respiratory distress. Discussed the pros and cons of paxlovid and she meets criteria based on age. She has opted to try this for her covid symptoms. Patient is to take medications as directed. Continue all home medications. Potential side effects of the medications were reviewed with the patient in detail. The patient can increase fluids. The patient can have Tylenol or Motrin for pain and fever as needed. Discussed with patient signs and symptoms that would require emergent evaluation and treatment. The patient will follow-up with their family physician or go to the ER if they develop any worsening symptoms. The patient was discharged in stable condition      1. COVID-19        DISPOSITION/PLAN   DISPOSITION Decision To Discharge 07/24/2022 11:21:22 AM    PATIENT REFERRED TO:  Mustapha Browning 1, [address]  [phone]    In 5 days  As needed  DISCHARGE MEDICATIONS:  New Prescriptions    NIRMATRELVIR/RITONAVIR (PAXLOVID) 20 X 150 MG & 10 X 100MG    Take 3 tablets (two 150 mg nirmatrelvir and one 100 mg ritonavir tablets) by mouth every 12 hours for 5 days.      Current Discharge Medication List          ADE Coelho - CNP         ADE Coelho - [place]  07/24/22 8902

## 2022-07-24 NOTE — ED NOTES
Patient understood instructions verbally,  Follow up with PCP with any concerns, e-script, Worse covid symptoms follow up with ED.ambulated self to lobby,stable condition. All belongings with patient.      Flip Farmer LPN  72/98/33 0063

## 2022-07-25 ENCOUNTER — CARE COORDINATION (OUTPATIENT)
Dept: CARE COORDINATION | Age: 77
End: 2022-07-25

## 2022-07-25 NOTE — CARE COORDINATION
Patient contacted regarding recent visit for viral symptoms. Call within 2 business days of discharge: Yes     contacted the patient by telephone to perform follow-up call. Verified name and  with patient as identifiers. Provided introduction to self, and reason for call due to viral symptoms of infection and/or exposure to COVID-19. Discussed COVID-19 related testing which was available at this time. Test results were positive. Patient informed of results, if available? Yes. Patient presented to emergency department/flu clinic with complaints of viral symptoms/exposure to COVID. Patient reports symptoms are the same. Due to no new or worsening symptoms the RN CTN/IVELISSE was not notified for escalation. This author reviewed discharge instructions, medical action plan and red flags such as increased shortness of breath, increasing fever, worsening cough or chest pain with patient who verbalized understanding. Discussed exposure protocols and quarantine with CDC Guidelines What To Do If You Are Sick    Patient was given an opportunity for questions and concerns. The patient agrees to contact their healthcare provider for questions related to their healthcare. Author provided contact information for future reference. Followed up with the patient today in regards to her visit to the Urgent care yesterday were she tested positive for Covid. Patient said she feels about the same and is coughing up a yellow discharge. I suggested that she keeps  office in the loop on how she is doing, they can follow up if she needs anything. I educated the patient on covid precautions and gave her the covid hotline number.       [facility]   Medication Assistance  [address] [place] [address], and [employer]    U) [phone] (f) [phone]

## 2022-08-01 ENCOUNTER — HOSPITAL ENCOUNTER (EMERGENCY)
Age: 77
Discharge: HOME | End: 2022-08-01
Payer: MEDICARE

## 2022-08-01 VITALS
OXYGEN SATURATION: 100 % | HEART RATE: 90 BPM | SYSTOLIC BLOOD PRESSURE: 153 MMHG | TEMPERATURE: 97.2 F | RESPIRATION RATE: 18 BRPM | DIASTOLIC BLOOD PRESSURE: 90 MMHG

## 2022-08-01 DIAGNOSIS — J01.00 ACUTE MAXILLARY SINUSITIS, RECURRENCE NOT SPECIFIED: Primary | ICD-10-CM

## 2022-08-01 PROCEDURE — 99213 OFFICE O/P EST LOW 20 MIN: CPT | Performed by: NURSE PRACTITIONER

## 2022-08-01 PROCEDURE — 99213 OFFICE O/P EST LOW 20 MIN: CPT

## 2022-08-01 RX ORDER — AMOXICILLIN AND CLAVULANATE POTASSIUM 875; 125 MG/1; MG/1
1 TABLET, FILM COATED ORAL 2 TIMES DAILY
Qty: 14 TABLET | Refills: 0 | Status: SHIPPED | OUTPATIENT
Start: 2022-08-01 | End: 2022-08-08

## 2022-08-01 RX ORDER — PREDNISONE 20 MG/1
40 TABLET ORAL DAILY
Qty: 14 TABLET | Refills: 0 | Status: SHIPPED | OUTPATIENT
Start: 2022-08-01 | End: 2022-08-08

## 2022-08-01 ASSESSMENT — ENCOUNTER SYMPTOMS
SINUS PRESSURE: 1
SHORTNESS OF BREATH: 0
SORE THROAT: 0
NAUSEA: 0
COUGH: 0
DIARRHEA: 0
VOMITING: 0

## 2022-08-01 NOTE — ED TRIAGE NOTES
Pt to UC with c/o sinus pressure and headache. Pt was diagnosed with covid 07/24. Pt reports she took some left over prednisone and amoxicillin she had laying around yesterday and it made her feel better.

## 2022-08-01 NOTE — ED PROVIDER NOTES
[facility]  Urgent Care Encounter       CHIEF COMPLAINT       Chief Complaint   Patient presents with    Headache    Nasal Congestion       Nurses Notes reviewed and I agree except as noted in the HPI. HISTORY OF PRESENT ILLNESS   Kalpana Gupta is a 69 y.o. female who presents for evaluation of sinus pressure and congestion. Patient states that she was diagnosed with COVID 8 days ago and states that she had some significant symptoms of fatigue. States that these have improved but she has continued to have sinus congestion. She does report a history of allergies. States that she has taken multiple over-the-counter medications at home including antihistamines and decongestants. She denies any fever, chills, shortness of breath. She states that she did take an at home COVID test yesterday and was negative. The history is provided by the patient. REVIEW OF SYSTEMS     Review of Systems   Constitutional:  Negative for chills and fever. HENT:  Positive for congestion and sinus pressure. Negative for sore throat. Respiratory:  Negative for cough and shortness of breath. Cardiovascular:  Negative for chest pain. Gastrointestinal:  Negative for diarrhea, nausea and vomiting. Musculoskeletal:  Negative for arthralgias and myalgias. Skin:  Negative for rash. Allergic/Immunologic: Positive for environmental allergies. Neurological:  Negative for headaches. PAST MEDICAL HISTORY         Diagnosis Date    Allergic rhinitis     Chronic kidney disease     Claustrophobia 2/24/2016    Hyperlipidemia     Obesity     ARTIS on CPAP        SURGICALHISTORY     Patient  has a past surgical history that includes Colonoscopy (2007); knee surgery (Left, 2016); pr colon ca scrn not hi rsk ind (Left, 1/8/2018); eye surgery (Right, 02/28/2019); and eye surgery (Left, 03/13/2019).     CURRENT MEDICATIONS       Previous Medications    CHOLECALCIFEROL (VITAMIN D3) 88282 UNITS CAPS Take 10,000 Units by mouth daily. LACTOBACILLUS (ACIDOPHILUS) 100 MG CAPS    Take by mouth    MAGNESIUM 500 MG CAPS    Take by mouth daily     MONTELUKAST (SINGULAIR) 10 MG TABLET    TAKE 1 TABLET NIGHTLY    NIACIN (VITAMIN B-3 PO)    Take by mouth daily     SYNTHROID 150 MCG TABLET    Take 1 tablet by mouth daily Take with water on an empty stomach- wait 30 minutes before eating or taking other meds. THERAPEUTIC MULTIVITAMIN-MINERALS (THERAGRAN-M) TABLET    Take 1 tablet by mouth daily. TIZANIDINE (ZANAFLEX) 4 MG TABLET    Take 1 tablet by mouth every 8 hours as needed (muscle spasms)    TRIAMCINOLONE (NASACORT) 55 MCG/ACT NASAL INHALER    2 sprays by Nasal route daily as needed    VITAMIN B-12 (CYANOCOBALAMIN) 500 MCG TABLET    Take 500 mcg by mouth daily Indications: 2500mg a day    VITAMIN E 400 UNIT CAPSULE    Take 400 Units by mouth daily       ALLERGIES     Patient is is allergic to naproxen. Patients   Immunization History   Administered Date(s) Administered    COVID-19, [facility], Primary or Immunocompromised, (age 12y+), IM, 100 mcg/0.5mL 02/19/2021, 03/19/2021, 12/06/2021    Influenza Vaccine, unspecified formulation 10/26/2010    Influenza Virus Vaccine 12/03/2014    Influenza, Quadv, adjuvanted, 65 yrs +, IM, PF (Fluad) 10/19/2021    Pneumococcal Conjugate 13-valent ([id number]) 11/06/2018    Pneumococcal Polysaccharide ([id number]) 12/03/2014    Td vaccine (adult) 03/21/2001    Tdap (Boostrix, Adacel) 02/25/2020    Zoster Live (Zostavax) 11/13/2015       FAMILY HISTORY     Patient's family history includes Arthritis in her mother; Dementia in her mother; Diabetes in her father. SOCIAL HISTORY     Patient  reports that she quit smoking about 26 years ago. Her smoking use included cigarettes. She has a 3.75 pack-year smoking history. She has never used smokeless tobacco. She reports current alcohol use. She reports that she does not use drugs.     PHYSICAL EXAM     ED TRIAGE VITALS  BP: (!) 153/90, Temp: 97.2 °F (36.2 °C), Heart Rate: 90, Resp: 18, SpO2: 100 %,Estimated body mass index is 40.72 kg/m² as calculated from the following:    Height as of 7/24/22: 5' 7\" (1.702 m). Weight as of 7/24/22: 260 lb (117.9 kg). ,No LMP recorded. Patient is postmenopausal.    Physical Exam  Vitals and nursing note reviewed. Constitutional:       General: She is not in acute distress. Appearance: She is well-developed. She is not diaphoretic. HENT:      Right Ear: Tympanic membrane is bulging. Tympanic membrane is not erythematous. Left Ear: Tympanic membrane is bulging. Tympanic membrane is not erythematous. Nose:      Right Sinus: Maxillary sinus tenderness present. No frontal sinus tenderness. Left Sinus: Maxillary sinus tenderness present. No frontal sinus tenderness. Mouth/Throat:      Mouth: Mucous membranes are moist.      Pharynx: Oropharynx is clear. Eyes:      Conjunctiva/sclera:      Right eye: Right conjunctiva is not injected. Left eye: Left conjunctiva is not injected. Pupils: Pupils are equal.   Cardiovascular:      Rate and Rhythm: Normal rate and regular rhythm. Heart sounds: No murmur heard. Pulmonary:      Effort: Pulmonary effort is normal. No respiratory distress. Breath sounds: Normal breath sounds. Musculoskeletal:      Cervical back: Normal range of motion. Skin:     General: Skin is warm. Findings: No rash. Neurological:      Mental Status: She is alert and oriented to person, place, and time. Psychiatric:         Behavior: Behavior normal.       DIAGNOSTIC RESULTS     Labs:No results found for this visit on 08/01/22. IMAGING:    No orders to display         EKG:      URGENT CARE COURSE:     Vitals:    08/01/22 0808   BP: (!) 153/90   Pulse: 90   Resp: 18   Temp: 97.2 °F (36.2 °C)   SpO2: 100%       Medications - No data to display         PROCEDURES:  None    FINAL IMPRESSION      1.  Acute maxillary sinusitis, recurrence not specified          DISPOSITION/ PLAN     Exam is consistent with maxillary sinusitis at this time. I discussed with the patient we will plan to treat with oral antibiotics and steroids and she is advised to continue her over-the-counter medications at home. She is agreeable with the plan as discussed and will follow-up on an outpatient basis as needed. PATIENT REFERRED TO:  DO Roz Nelson, [address] / [facility] [id number]      DISCHARGE MEDICATIONS:  New Prescriptions    AMOXICILLIN-CLAVULANATE (AUGMENTIN) 875-125 MG PER TABLET    Take 1 tablet by mouth in the morning and 1 tablet before bedtime. Do all this for 7 days. PREDNISONE (DELTASONE) 20 MG TABLET    Take 2 tablets by mouth in the morning for 7 days.        Discontinued Medications    No medications on file       Current Discharge Medication List          ADE Moreno CNP    (Please note that portions of this note were completed with a voice recognition program. Efforts were made to edit the dictations but occasionally words are mis-transcribed.)          ADE Moreno CNP  08/01/22 1352

## 2022-08-02 ENCOUNTER — TELEPHONE (OUTPATIENT)
Dept: FAMILY MEDICINE CLINIC | Age: 77
End: 2022-08-02

## 2022-08-02 ENCOUNTER — CARE COORDINATION (OUTPATIENT)
Dept: CARE COORDINATION | Age: 77
End: 2022-08-02

## 2022-08-02 NOTE — CARE COORDINATION
Attempted to reach the patient for a 1 week follow up call from the urgent care and for her visit yesterday. No answer.         [facility]   Medication Assistance  [address] [place] [address], and [email or web address]    E) [phone] (L) [phone]

## 2022-10-06 ENCOUNTER — TELEMEDICINE (OUTPATIENT)
Dept: FAMILY MEDICINE CLINIC | Age: 77
End: 2022-10-06
Payer: MEDICARE

## 2022-10-06 DIAGNOSIS — J01.90 ACUTE RHINOSINUSITIS: Primary | ICD-10-CM

## 2022-10-06 PROCEDURE — 99442 PR PHYS/QHP TELEPHONE EVALUATION 11-20 MIN: CPT | Performed by: FAMILY MEDICINE

## 2022-10-06 RX ORDER — DOXYCYCLINE HYCLATE 100 MG
100 TABLET ORAL 2 TIMES DAILY
Qty: 14 TABLET | Refills: 0 | Status: SHIPPED | OUTPATIENT
Start: 2022-10-06 | End: 2022-10-13

## 2022-10-06 ASSESSMENT — ENCOUNTER SYMPTOMS
RHINORRHEA: 1
SINUS PRESSURE: 1
SINUS PAIN: 1
GASTROINTESTINAL NEGATIVE: 1
COUGH: 0

## 2022-10-06 NOTE — PROGRESS NOTES
Eva Orellana (:  1945) is a Established patient, here for evaluation of the following:         Subjective   HPI:   Chief Complaint   Patient presents with    Sinusitis       Pt presents today with c/o sinus pressure, congestion, ear pain, HA's for the last several weeks. Had Jemal in August and has not covered since. She is using Xyzal, Nasacort    Patient Active Problem List   Diagnosis    Hypothyroid    Environmental allergies    Hyperlipidemia with target LDL less than 100    Post menopausal problems    Claustrophobia    Difficulty waking    Morbid obesity with BMI of 40.0-44.9, adult (HCC)    ARTIS on CPAP    Insomnia     Past Surgical History:   Procedure Laterality Date    COLONOSCOPY  2007    Wisser    EYE SURGERY Right 2019    Cataract Surgery with Dr. Neeraj Garcia Left 2019    Cataract Surgery with Dr. Georgia Eng Left     Dr Margarita Steinberg Left 2018    COLONOSCOPY SCREENING performed by Rowena Bueno DO at [facility]     Social History     Tobacco Use    Smoking status: Former     Packs/day: 0.75     Years: 5.00     Pack years: 3.75     Types: Cigarettes     Quit date: 1996     Years since quittin.4    Smokeless tobacco: Never    Tobacco comments:     quit    Vaping Use    Vaping Use: Never used   Substance Use Topics    Alcohol use: Yes     Comment: socially    Drug use: No       Review of Systems   Constitutional: Negative. Negative for fever. HENT:  Positive for congestion, postnasal drip, rhinorrhea, sinus pressure and sinus pain. Respiratory:  Negative for cough. Cardiovascular: Negative. Gastrointestinal: Negative. Musculoskeletal: Negative. Neurological:  Positive for headaches. All other systems reviewed and are negative. Objective   Patient-Reported Vitals  No data recorded     Physical Exam  Constitutional:       General: She is not in acute distress.   Pulmonary: Effort: Pulmonary effort is normal. No respiratory distress. Neurological:      Mental Status: She is alert. Mental status is at baseline. Psychiatric:         Mood and Affect: Mood normal.         Behavior: Behavior normal.         Thought Content: Thought content normal.         Judgment: Judgment normal.     Assessment & Plan   Below is the assessment and plan developed based on review of pertinent history, physical exam, labs, studies, and medications. 1. Acute rhinosinusitis  -     doxycycline hyclate (VIBRA-TABS) 100 MG tablet; Take 1 tablet by mouth 2 times daily for 7 days, Disp-14 tablet, R-0Normal    -  OTC Afrin NS    Return if symptoms worsen or fail to improve. On this date 10/6/2022 I have spent 11-20 minutes reviewing previous notes, test results and face to face (virtual) with the patient discussing the diagnosis and importance of compliance with the treatment plan as well as documenting on the day of the visit.          --Tanya Porras, DO

## 2022-11-04 RX ORDER — SULFAMETHOXAZOLE AND TRIMETHOPRIM 800; 160 MG/1; MG/1
1 TABLET ORAL 2 TIMES DAILY
Qty: 6 TABLET | Refills: 0 | Status: SHIPPED | OUTPATIENT
Start: 2022-11-04 | End: 2022-11-07

## 2022-12-05 NOTE — PROGRESS NOTES
[facility]      Mary Allen         [id number]  12/6/2022   Chief Complaint   Patient presents with    Follow-up     1yr ARTIS f/u w/[place] download. Notes she wakes multiple times per night. Pt of Dr. David Alejandro    PAP Download:   Original or initial AHI: 27.9     Date of initial study: 3/22/16      Compliant  100%     Noncompliant 0 %     PAP Type CPAP    Level  [id number]   Avg Hrs/Day 8hrs 44mins  AHI: 1.5   Recorded compliance dates , 9/6/22  to 12/4/22   Machine/Mfg:   [x] ResMed    [] Respironics/Dreamstation   Interface:   [] Nasal    [x] Nasal pillows   [] FFM      Provider:      [] [facility]     [][employer]     [] [employer]    [x] Darrol Kekaha     [] [place]               [] [facility] [facility]      [] [employer]    [] [address]      [] Other    Neck Size: 18  Mallampati 4  ESS:  3  SAQLI: 70    Here is a scan of the most recent download:            Presentation:   Chace Fernando presents for sleep medicine follow up for obstructive sleep apnea  Since the last visit, Chace Fernando is doing well with new PAP. She is sleeping well and feels rested. She is occ waking up through the night. Equipment issues: The pressure is  acceptable, the mask is acceptable     Sleep issues:  Do you feel better? Yes  More rested? Yes   Better concentration? yes    Progress History:   Since last visit any new medical issues? No  New ER or hospital visits? No  Any new or changes in medicines? No  Any new sleep medicines? No    Review of Systems -   Review of Systems   Constitutional:  Negative for activity change, appetite change, chills and fever. HENT:  Negative for congestion and postnasal drip. Eyes: Negative. Respiratory:  Negative for cough, chest tightness, shortness of breath, wheezing and stridor. Cardiovascular:  Negative for chest pain and leg swelling. Gastrointestinal:  Negative for diarrhea and nausea. Endocrine: Negative. Genitourinary: Negative.     Musculoskeletal: Negative. Negative for arthralgias and back pain. Skin: Negative. Allergic/Immunologic: Negative. Neurological: Negative. Negative for dizziness and light-headedness. Psychiatric/Behavioral: Negative. All other systems reviewed and are negative. Physical Exam:    BMI:  Body mass index is 41.97 kg/m². Wt Readings from Last 3 Encounters:   12/06/22 268 lb (121.6 kg)   07/24/22 260 lb (117.9 kg)   06/29/22 262 lb 9.6 oz (119.1 kg)     Weight stable / unchanged  Vitals: /70 (Site: Left Upper Arm, Position: Sitting, Cuff Size: Large Adult)   Pulse 82   Temp 97.7 °F (36.5 °C) (Oral)   Ht 5' 7\" (1.702 m)   Wt 268 lb (121.6 kg)   SpO2 95% Comment: r/a  BMI 41.97 kg/m²       Physical Exam  Constitutional:       General: She is not in acute distress. Appearance: Normal appearance. She is normal weight. She is not ill-appearing. HENT:      Head: Normocephalic and atraumatic. Nose: Nose normal.   Eyes:      Extraocular Movements: Extraocular movements intact. Pupils: Pupils are equal, round, and reactive to light. Pulmonary:      Effort: Pulmonary effort is normal.   Neurological:      Mental Status: She is alert and oriented to person, place, and time. Psychiatric:         Attention and Perception: Attention and perception normal.         Mood and Affect: Mood and affect normal.         Speech: Speech normal.         Behavior: Behavior normal.         Thought Content: Thought content normal.         Cognition and Memory: Cognition and memory normal.         Judgment: Judgment normal.         ASSESSMENT/DIAGNOSIS     Diagnosis Orders   1. ARTIS on CPAP        2. Morbid obesity with BMI of 40.0-44.9, adult [facility])                 Plan   Do you need any equipment today? Yes update supplies  - Download reviewed and discussed with patient  - She  was advised to continue current positive airway pressure therapy with above described pressure.    - She  advised to keep good compliance with current recommended pressure to get optimal results and clinical improvement  - Recommend 7-9 hours of sleep with PAP  - She was advised to call [email or web address] company regarding supplies if needed.   -She call my office for earlier appointment if needed for worsening of sleep symptoms.   - She was instructed on weight loss  - Glenny Alejandro was educated about my impression and plan. Patient verbalizesunderstanding.   We will see Glenny Orellana back in: 1 year with download    Information added by my medical assistant/LPN was reviewed today       Yolande Wayne, [address] for pulmonary and Sleep Medicine  12/6/2022

## 2022-12-06 ENCOUNTER — OFFICE VISIT (OUTPATIENT)
Dept: PULMONOLOGY | Age: 77
End: 2022-12-06
Payer: MEDICARE

## 2022-12-06 VITALS
HEIGHT: 67 IN | DIASTOLIC BLOOD PRESSURE: 70 MMHG | BODY MASS INDEX: 42.06 KG/M2 | TEMPERATURE: 97.7 F | OXYGEN SATURATION: 95 % | HEART RATE: 82 BPM | SYSTOLIC BLOOD PRESSURE: 124 MMHG | WEIGHT: 268 LBS

## 2022-12-06 DIAGNOSIS — Z99.89 OSA ON CPAP: Primary | ICD-10-CM

## 2022-12-06 DIAGNOSIS — E66.01 MORBID OBESITY WITH BMI OF 40.0-44.9, ADULT (HCC): ICD-10-CM

## 2022-12-06 DIAGNOSIS — G47.33 OSA ON CPAP: Primary | ICD-10-CM

## 2022-12-06 PROCEDURE — 1123F ACP DISCUSS/DSCN MKR DOCD: CPT | Performed by: PHYSICIAN ASSISTANT

## 2022-12-06 PROCEDURE — 99213 OFFICE O/P EST LOW 20 MIN: CPT | Performed by: PHYSICIAN ASSISTANT

## 2022-12-06 ASSESSMENT — ENCOUNTER SYMPTOMS
SHORTNESS OF BREATH: 0
BACK PAIN: 0
DIARRHEA: 0
CHEST TIGHTNESS: 0
NAUSEA: 0
EYES NEGATIVE: 1
COUGH: 0
WHEEZING: 0
STRIDOR: 0
ALLERGIC/IMMUNOLOGIC NEGATIVE: 1

## 2023-02-03 ENCOUNTER — OFFICE VISIT (OUTPATIENT)
Dept: FAMILY MEDICINE CLINIC | Age: 78
End: 2023-02-03

## 2023-02-03 VITALS
DIASTOLIC BLOOD PRESSURE: 72 MMHG | HEIGHT: 67 IN | SYSTOLIC BLOOD PRESSURE: 128 MMHG | OXYGEN SATURATION: 95 % | HEART RATE: 81 BPM | WEIGHT: 266.9 LBS | TEMPERATURE: 97.6 F | RESPIRATION RATE: 18 BRPM | BODY MASS INDEX: 41.89 KG/M2

## 2023-02-03 DIAGNOSIS — R73.01 IFG (IMPAIRED FASTING GLUCOSE): ICD-10-CM

## 2023-02-03 DIAGNOSIS — J32.9 CHRONIC SINUSITIS, UNSPECIFIED LOCATION: Primary | ICD-10-CM

## 2023-02-03 DIAGNOSIS — R63.5 WEIGHT GAIN: ICD-10-CM

## 2023-02-03 RX ORDER — AMOXICILLIN AND CLAVULANATE POTASSIUM 875; 125 MG/1; MG/1
1 TABLET, FILM COATED ORAL 2 TIMES DAILY
Qty: 42 TABLET | Refills: 0 | Status: SHIPPED | OUTPATIENT
Start: 2023-02-03 | End: 2023-02-24

## 2023-02-03 SDOH — ECONOMIC STABILITY: FOOD INSECURITY: WITHIN THE PAST 12 MONTHS, THE FOOD YOU BOUGHT JUST DIDN'T LAST AND YOU DIDN'T HAVE MONEY TO GET MORE.: NEVER TRUE

## 2023-02-03 SDOH — ECONOMIC STABILITY: INCOME INSECURITY: HOW HARD IS IT FOR YOU TO PAY FOR THE VERY BASICS LIKE FOOD, HOUSING, MEDICAL CARE, AND HEATING?: NOT HARD AT ALL

## 2023-02-03 SDOH — ECONOMIC STABILITY: HOUSING INSECURITY
IN THE LAST 12 MONTHS, WAS THERE A TIME WHEN YOU DID NOT HAVE A STEADY PLACE TO SLEEP OR SLEPT IN A SHELTER (INCLUDING NOW)?: NO

## 2023-02-03 SDOH — ECONOMIC STABILITY: FOOD INSECURITY: WITHIN THE PAST 12 MONTHS, YOU WORRIED THAT YOUR FOOD WOULD RUN OUT BEFORE YOU GOT MONEY TO BUY MORE.: NEVER TRUE

## 2023-02-03 ASSESSMENT — ENCOUNTER SYMPTOMS
SINUS PAIN: 1
RHINORRHEA: 1
GASTROINTESTINAL NEGATIVE: 1
RESPIRATORY NEGATIVE: 1
SINUS PRESSURE: 1

## 2023-02-03 ASSESSMENT — PATIENT HEALTH QUESTIONNAIRE - PHQ9
SUM OF ALL RESPONSES TO PHQ QUESTIONS 1-9: 0
SUM OF ALL RESPONSES TO PHQ9 QUESTIONS 1 & 2: 0
1. LITTLE INTEREST OR PLEASURE IN DOING THINGS: 0
2. FEELING DOWN, DEPRESSED OR HOPELESS: 0

## 2023-02-03 NOTE — PROGRESS NOTES
Eva Orellana (:  1945) is a 69 y.o. female,Established patient, here for evaluation of the following chief complaint(s):  Headache (Patient states that she has been experiencing constant headaches. Patient states that she has ear pressure and low grade temp, fatigue) and Referral - General (Patient would like referral to ENT)        Subjective   SUBJECTIVE/OBJECTIVE:  HPI:    Chief Complaint   Patient presents with    Headache     Patient states that she has been experiencing constant headaches. Patient states that she has ear pressure and low grade temp, fatigue    Referral - General     Patient would like referral to ENT     Pt presents today to discuss her chronic sinus issues. Will clear with abx but come right back. Has tried every OTC medication and current on Xyzal and Nasacort. Weight is an issue for her and she would like to have her sugar checked.   Wt Readings from Last 3 Encounters:   23 266 lb 14.4 oz (121.1 kg)   22 268 lb (121.6 kg)   22 260 lb (117.9 kg)       Patient Active Problem List   Diagnosis    Hypothyroid    Environmental allergies    Hyperlipidemia with target LDL less than 100    Post menopausal problems    Claustrophobia    Difficulty waking    Morbid obesity with BMI of 40.0-44.9, adult (HCC)    ARTIS on CPAP    Insomnia     Past Surgical History:   Procedure Laterality Date    COLONOSCOPY      Wisser    EYE SURGERY Right 2019    Cataract Surgery with Dr. Corrinne Siemens Left 2019    Cataract Surgery with Dr. Nini Ruby Left     Dr Parisa Glaser Left 2018    COLONOSCOPY SCREENING performed by John Callahan DO at [facility]     Social History     Tobacco Use    Smoking status: Former     Packs/day: 0.75     Years: 5.00     Pack years: 3.75     Types: Cigarettes     Quit date: 1996     Years since quittin.7    Smokeless tobacco: Never    Tobacco comments:     quit 1996   Vaping Use    Vaping Use: Never used   Substance Use Topics    Alcohol use: Yes     Comment: socially    Drug use: No         Review of Systems   Constitutional:  Positive for fatigue and unexpected weight change. Negative for fever. HENT:  Positive for congestion, ear pain, postnasal drip, rhinorrhea, sinus pressure and sinus pain. Respiratory: Negative. Cardiovascular: Negative. Gastrointestinal: Negative. Musculoskeletal: Negative. Neurological:  Positive for headaches. All other systems reviewed and are negative. Objective   Physical Exam  Vitals and nursing note reviewed. Constitutional:       General: She is not in acute distress. Appearance: Normal appearance. She is well-developed. HENT:      Head: Normocephalic and atraumatic. Right Ear: Tympanic membrane normal.      Left Ear: Tympanic membrane normal.      Nose: Septal deviation, mucosal edema and rhinorrhea present. Eyes:      Conjunctiva/sclera: Conjunctivae normal.   Cardiovascular:      Rate and Rhythm: Normal rate and regular rhythm. Heart sounds: Normal heart sounds. No murmur heard. Pulmonary:      Effort: Pulmonary effort is normal.      Breath sounds: Normal breath sounds. No wheezing, rhonchi or rales. Abdominal:      General: There is no distension. Musculoskeletal:      Cervical back: Neck supple. Skin:     General: Skin is warm and dry. Findings: No rash (on exposed surfaces). Neurological:      General: No focal deficit present. Mental Status: She is alert. Psychiatric:         Attention and Perception: Attention normal.         Mood and Affect: Mood normal.         Speech: Speech normal.         Behavior: Behavior normal. Behavior is cooperative. Thought Content: Thought content normal.         Judgment: Judgment normal.             ASSESSMENT/PLAN:  1.  Chronic sinusitis, unspecified location  -     amoxicillin-clavulanate (AUGMENTIN) 875-125 MG per tablet; Take 1 tablet by mouth 2 times daily for 21 days, Disp-42 tablet, R-0Normal  -     CT SINUS WO CONTRAST; Future  -     IgG; Future  -     IgA; Future  -     IgM; Future  -     IgE; Future  -     CBC with Auto Differential; Future  -     Sonya Sanchez MD, Otolaryngology, [facility]  2. IFG (impaired fasting glucose)  -     Hemoglobin A1C; Future  3. Weight gain    -  Orders above  -  CT at end of 3 weeks of abx    Return if symptoms worsen or fail to improve. An electronic signature was used to authenticate this note.     --Darvin Castano, DO

## 2023-02-13 ENCOUNTER — NURSE ONLY (OUTPATIENT)
Dept: LAB | Age: 78
End: 2023-02-13

## 2023-02-13 DIAGNOSIS — J32.9 CHRONIC SINUSITIS, UNSPECIFIED LOCATION: ICD-10-CM

## 2023-02-13 DIAGNOSIS — R73.01 IFG (IMPAIRED FASTING GLUCOSE): ICD-10-CM

## 2023-02-13 LAB
BASOPHILS ABSOLUTE: 0.1 THOU/MM3 (ref 0–0.1)
BASOPHILS NFR BLD AUTO: 0.7 %
DEPRECATED MEAN GLUCOSE BLD GHB EST-ACNC: 111 MG/DL (ref 70–126)
DEPRECATED RDW RBC AUTO: 46.1 FL (ref 35–45)
EOSINOPHIL NFR BLD AUTO: 1.4 %
EOSINOPHILS ABSOLUTE: 0.1 THOU/MM3 (ref 0–0.4)
ERYTHROCYTE [DISTWIDTH] IN BLOOD BY AUTOMATED COUNT: 12.7 % (ref 11.5–14.5)
HBA1C MFR BLD HPLC: 5.7 % (ref 4.4–6.4)
HCT VFR BLD AUTO: 46.4 % (ref 37–47)
HGB BLD-MCNC: 15.2 GM/DL (ref 12–16)
IMM GRANULOCYTES # BLD AUTO: 0.03 THOU/MM3 (ref 0–0.07)
IMM GRANULOCYTES NFR BLD AUTO: 0.3 %
LYMPHOCYTES ABSOLUTE: 2.2 THOU/MM3 (ref 1–4.8)
LYMPHOCYTES NFR BLD AUTO: 21.1 %
MCH RBC QN AUTO: 32 PG (ref 26–33)
MCHC RBC AUTO-ENTMCNC: 32.8 GM/DL (ref 32.2–35.5)
MCV RBC AUTO: 97.7 FL (ref 81–99)
MONOCYTES ABSOLUTE: 0.8 THOU/MM3 (ref 0.4–1.3)
MONOCYTES NFR BLD AUTO: 7.9 %
NEUTROPHILS NFR BLD AUTO: 68.6 %
NRBC BLD AUTO-RTO: 0 /100 WBC
PLATELET # BLD AUTO: 225 THOU/MM3 (ref 130–400)
PMV BLD AUTO: 11.6 FL (ref 9.4–12.4)
RBC # BLD AUTO: 4.75 MILL/MM3 (ref 4.2–5.4)
SEGMENTED NEUTROPHILS ABSOLUTE COUNT: 7.1 THOU/MM3 (ref 1.8–7.7)
WBC # BLD AUTO: 10.4 THOU/MM3 (ref 4.8–10.8)

## 2023-02-14 LAB
IGA SERPL-MCNC: 122 MG/DL (ref 70–400)
IGE SERPL-ACNC: 9 IU/ML
IGG SERPL-MCNC: 1143 MG/DL (ref 700–1600)
IGM SERPL-MCNC: 96 MG/DL (ref 40–230)

## 2023-02-23 ENCOUNTER — OFFICE VISIT (OUTPATIENT)
Dept: FAMILY MEDICINE CLINIC | Age: 78
End: 2023-02-23
Payer: MEDICARE

## 2023-02-23 VITALS
DIASTOLIC BLOOD PRESSURE: 76 MMHG | WEIGHT: 265.7 LBS | RESPIRATION RATE: 16 BRPM | HEART RATE: 68 BPM | BODY MASS INDEX: 41.61 KG/M2 | SYSTOLIC BLOOD PRESSURE: 132 MMHG

## 2023-02-23 DIAGNOSIS — E66.01 MORBID OBESITY WITH BMI OF 40.0-44.9, ADULT (HCC): ICD-10-CM

## 2023-02-23 DIAGNOSIS — G89.29 CHRONIC RIGHT FLANK PAIN: Primary | ICD-10-CM

## 2023-02-23 DIAGNOSIS — M51.36 DDD (DEGENERATIVE DISC DISEASE), LUMBAR: ICD-10-CM

## 2023-02-23 DIAGNOSIS — R10.9 CHRONIC RIGHT FLANK PAIN: Primary | ICD-10-CM

## 2023-02-23 DIAGNOSIS — R60.0 LOWER LEG EDEMA: ICD-10-CM

## 2023-02-23 PROCEDURE — 1123F ACP DISCUSS/DSCN MKR DOCD: CPT | Performed by: FAMILY MEDICINE

## 2023-02-23 PROCEDURE — 99214 OFFICE O/P EST MOD 30 MIN: CPT | Performed by: FAMILY MEDICINE

## 2023-02-23 NOTE — PROGRESS NOTES
Eva Orellana (:  1945) is a 69 y.o. female,Established patient, here for evaluation of the following chief complaint(s):  Flank Pain (Right only at bedtime. Denies UTI s/s/) and Leg Swelling (Bilateral/)        Subjective   SUBJECTIVE/OBJECTIVE:  HPI:    Chief Complaint   Patient presents with    Flank Pain     Right only at bedtime. Denies UTI s/s      Leg Swelling     Bilateral       Pt presents with a few concerns today. Pt has right flank pain for the last several months. Pain only at bedtime. Denies hematuria, frequency, urgency. Concerned about the pain coming from her kidneys once she started noticing some LE edema also. Weight is stable.   Wt Readings from Last 3 Encounters:   23 265 lb 11.2 oz (120.5 kg)   23 266 lb 14.4 oz (121.1 kg)   22 268 lb (121.6 kg)     Swelling worse as the day goes on, leg ok in the am.      Patient Active Problem List   Diagnosis    Hypothyroid    Environmental allergies    Hyperlipidemia with target LDL less than 100    Post menopausal problems    Claustrophobia    Difficulty waking    Morbid obesity with BMI of 40.0-44.9, adult (HCC)    ARTIS on CPAP    Insomnia     Past Surgical History:   Procedure Laterality Date    COLONOSCOPY  2007    Wisser    EYE SURGERY Right 2019    Cataract Surgery with Dr. Melani Anthony Left 2019    Cataract Surgery with Dr. Bryon Conway Left     Dr Sarahi Tamez  [address] [place] [address] Left 2018    COLONOSCOPY SCREENING performed by Paola Gonzalez DO at [facility]     Social History     Tobacco Use    Smoking status: Former     Packs/day: 0.75     Years: 5.00     Pack years: 3.75     Types: Cigarettes     Quit date: 1996     Years since quittin.8    Smokeless tobacco: Never    Tobacco comments:     quit    Vaping Use    Vaping Use: Never used   Substance Use Topics    Alcohol use: Yes     Comment: socially    Drug use: No         Review of Systems   Constitutional: Negative. HENT: Negative. Respiratory: Negative. Cardiovascular:  Positive for leg swelling. Gastrointestinal: Negative. Genitourinary:  Positive for flank pain. All other systems reviewed and are negative. Objective   Physical Exam  Vitals and nursing note reviewed. Constitutional:       General: She is not in acute distress. Appearance: Normal appearance. She is well-developed. HENT:      Head: Normocephalic and atraumatic. Right Ear: Tympanic membrane normal.      Left Ear: Tympanic membrane normal.   Eyes:      Conjunctiva/sclera: Conjunctivae normal.   Cardiovascular:      Rate and Rhythm: Normal rate and regular rhythm. Heart sounds: Normal heart sounds. No murmur heard. Pulmonary:      Effort: Pulmonary effort is normal.      Breath sounds: Normal breath sounds. No wheezing, rhonchi or rales. Abdominal:      General: There is no distension. Musculoskeletal:      Cervical back: Neck supple. Thoracic back: Tenderness present. No spasms. Normal range of motion. Back:       Right lower leg: Edema present. Left lower leg: Edema (trace LE edema) present. Skin:     General: Skin is warm and dry. Findings: No rash (on exposed surfaces). Neurological:      General: No focal deficit present. Mental Status: She is alert. Psychiatric:         Attention and Perception: Attention normal.         Mood and Affect: Mood normal.         Speech: Speech normal.         Behavior: Behavior normal. Behavior is cooperative. Thought Content: Thought content normal.         Judgment: Judgment normal.             ASSESSMENT/PLAN:  1. Chronic right flank pain  -     CT ABDOMEN W CONTRAST; Future  -     Creatinine; Future  2. Lower leg edema  3. Morbid obesity with BMI of 40.0-44.9, adult ([address]  4.  DDD (degenerative disc disease), lumbar    -  Feel that her pain is likely MSK in nature but will get a CT to rule out other organic cause  -  Pain unrelated to #2  -  LE elevation prn  -  Low sodium diet    Return if symptoms worsen or fail to improve. An electronic signature was used to authenticate this note.     --Lu President, DO

## 2023-02-27 ENCOUNTER — NURSE ONLY (OUTPATIENT)
Dept: LAB | Age: 78
End: 2023-02-27

## 2023-02-27 DIAGNOSIS — G89.29 CHRONIC RIGHT FLANK PAIN: ICD-10-CM

## 2023-02-27 DIAGNOSIS — R10.9 CHRONIC RIGHT FLANK PAIN: ICD-10-CM

## 2023-02-27 LAB
CREAT SERPL-MCNC: 0.7 MG/DL (ref 0.4–1.2)
GFR SERPL CREATININE-BSD FRML MDRD: > 60 ML/MIN/1.73M2

## 2023-02-27 ASSESSMENT — ENCOUNTER SYMPTOMS
GASTROINTESTINAL NEGATIVE: 1
RESPIRATORY NEGATIVE: 1

## 2023-03-10 ENCOUNTER — HOSPITAL ENCOUNTER (OUTPATIENT)
Dept: CT IMAGING | Age: 78
Discharge: HOME | End: 2023-03-10
Payer: MEDICARE

## 2023-03-10 DIAGNOSIS — J32.9 CHRONIC SINUSITIS, UNSPECIFIED LOCATION: ICD-10-CM

## 2023-03-10 PROCEDURE — 70486 CT MAXILLOFACIAL W/O DYE: CPT

## 2023-03-29 ENCOUNTER — TELEPHONE (OUTPATIENT)
Dept: FAMILY MEDICINE CLINIC | Age: 78
End: 2023-03-29

## 2023-03-29 DIAGNOSIS — G89.29 CHRONIC RIGHT FLANK PAIN: Primary | ICD-10-CM

## 2023-03-29 DIAGNOSIS — R10.9 CHRONIC RIGHT FLANK PAIN: Primary | ICD-10-CM

## 2023-03-29 NOTE — TELEPHONE ENCOUNTER
Received a call from [facility] with [facility] stating that the patients insurance is denying the CT ABD with contrast, put will approve a CT ABD without contrast.  If contrast is needed will need to do a peer to peer. Please advise. If order changed to without contrast no need to call [facility] back but if P2P is to be done she needs called.

## 2023-04-20 ENCOUNTER — HOSPITAL ENCOUNTER (OUTPATIENT)
Dept: CT IMAGING | Age: 78
Discharge: HOME | End: 2023-04-20
Payer: MEDICARE

## 2023-04-20 DIAGNOSIS — R10.9 CHRONIC RIGHT FLANK PAIN: ICD-10-CM

## 2023-04-20 DIAGNOSIS — G89.29 CHRONIC RIGHT FLANK PAIN: ICD-10-CM

## 2023-04-20 PROCEDURE — 74150 CT ABDOMEN W/O CONTRAST: CPT

## 2023-04-25 ENCOUNTER — TELEPHONE (OUTPATIENT)
Dept: ENT CLINIC | Age: 78
End: 2023-04-25

## 2023-04-25 ENCOUNTER — OFFICE VISIT (OUTPATIENT)
Dept: ENT CLINIC | Age: 78
End: 2023-04-25
Payer: MEDICARE

## 2023-04-25 VITALS
TEMPERATURE: 97.5 F | HEART RATE: 74 BPM | RESPIRATION RATE: 16 BRPM | BODY MASS INDEX: 40.16 KG/M2 | SYSTOLIC BLOOD PRESSURE: 116 MMHG | OXYGEN SATURATION: 98 % | DIASTOLIC BLOOD PRESSURE: 72 MMHG | WEIGHT: 265 LBS | HEIGHT: 68 IN

## 2023-04-25 DIAGNOSIS — J31.0 CHRONIC RHINOSINUSITIS: ICD-10-CM

## 2023-04-25 DIAGNOSIS — J34.89 REFRACTORY OBSTRUCTION OF NASAL AIRWAY: Primary | ICD-10-CM

## 2023-04-25 DIAGNOSIS — J32.9 CHRONIC RHINOSINUSITIS: ICD-10-CM

## 2023-04-25 DIAGNOSIS — Z01.818 PRE-OP TESTING: Primary | ICD-10-CM

## 2023-04-25 DIAGNOSIS — J34.89 CONCHA BULLOSA: ICD-10-CM

## 2023-04-25 DIAGNOSIS — J30.1 SEASONAL ALLERGIC RHINITIS DUE TO POLLEN: ICD-10-CM

## 2023-04-25 PROCEDURE — 99204 OFFICE O/P NEW MOD 45 MIN: CPT | Performed by: OTOLARYNGOLOGY

## 2023-04-25 PROCEDURE — 1123F ACP DISCUSS/DSCN MKR DOCD: CPT | Performed by: OTOLARYNGOLOGY

## 2023-04-25 NOTE — PROGRESS NOTES
[address] EAR, NOSE AND THROAT  [facility]  Dept: [phone]  Dept Fax: [phone]: [phone]    Office Visit and Procedure      HPI:     Wisam Martinez is a 69 y.o. female complaining of   Chief Complaint   Patient presents with    New Patient     Patient is here for Chronic sinusitis, unspecified location, Referred by Jonathan Roblero DO   . The patient presents for initial evaluation of chronic nasal airway obstruction and rhinosinusitis. She has a fresh CT scan performed only after 21 days on broad-spectrum oral antibiotics and aggressive nasal irrigation with topical steroids. It was positive for mucous membrane thickening throughout the ethmoid sinuses as well as bilateral davonte bullosa only 1 of which was picked up by the radiologist.  The patient reports having yearly difficulty with her nasal passages particularly in late summer where she describes her self literally having \"hayfever\" a low-grade temperature and facial discomfort as well as mucopurulent nasal discharge. The rest of the year she still has discomfort in these areas but that season is her worst.  She has been evaluated in the past for sinus surgery but has been reluctant to consider it. History: Allergies   Allergen Reactions    Naproxen Swelling     Current Outpatient Medications   Medication Sig Dispense Refill    NONFORMULARY AREDS      SYNTHROID 150 MCG tablet Take 1 tablet by mouth daily Take with water on an empty stomach- wait 30 minutes before eating or taking other meds.  90 tablet 3    Lactobacillus (ACIDOPHILUS) 100 MG CAPS Take by mouth      triamcinolone (NASACORT) 55 MCG/ACT nasal inhaler 2 sprays by Nasal route daily as needed      vitamin B-12 (CYANOCOBALAMIN) 500 MCG tablet Take 1 tablet by mouth daily Indications: 2500mg a day      Niacin (VITAMIN B-3 PO) Take by mouth daily       Magnesium 500 MG CAPS Take by

## 2023-05-24 ENCOUNTER — PATIENT MESSAGE (OUTPATIENT)
Dept: FAMILY MEDICINE CLINIC | Age: 78
End: 2023-05-24

## 2023-05-25 ENCOUNTER — TELEMEDICINE (OUTPATIENT)
Dept: FAMILY MEDICINE CLINIC | Age: 78
End: 2023-05-25

## 2023-05-25 DIAGNOSIS — J01.90 ACUTE RHINOSINUSITIS: Primary | ICD-10-CM

## 2023-05-25 RX ORDER — AMOXICILLIN AND CLAVULANATE POTASSIUM 875; 125 MG/1; MG/1
1 TABLET, FILM COATED ORAL 2 TIMES DAILY
Qty: 14 TABLET | Refills: 0 | Status: SHIPPED | OUTPATIENT
Start: 2023-05-25 | End: 2023-06-01

## 2023-05-25 ASSESSMENT — ENCOUNTER SYMPTOMS
SINUS PRESSURE: 1
RHINORRHEA: 1
SINUS PAIN: 1
GASTROINTESTINAL NEGATIVE: 1
RESPIRATORY NEGATIVE: 1

## 2023-05-25 NOTE — TELEPHONE ENCOUNTER
From: Libertad Orellana  To: Dr. Uriah Loera  Sent: 5/24/2023 6:32 PM EDT  Subject: Sinus Infection    Dr. Alexsandra Wyman---  I am having sinus surgery in July with Dr. Jamila Ayers. This high pollen count and helping Prem with his recent (yesterday) knee replacement, I am just miserable. Headache, 3 degree fever, my nasal passage just tingle. I am rinsing, using Nasacort, Zyrtec and ever some herbs. Can you help me? Need to make it to July. Thank you.   Tammie Garcia

## 2023-05-25 NOTE — PROGRESS NOTES
Eva Orellana (:  1945) is a Established patient, here for evaluation of the following:    Subjective   HPI:    Chief Complaint   Patient presents with    Sinusitis     See TE below:    Dr. Jason Myles---  I am having sinus surgery in July with Dr. Red . This high pollen count and helping Prem with his recent (yesterday) knee replacement, I am just miserable. Headache, 3 degree fever, my nasal passage just tingle. I am rinsing, using Nasacort, Zyrtec and ever some herbs. Can you help me? Need to make it to July. Patient Active Problem List   Diagnosis    Hypothyroid    Environmental allergies    Hyperlipidemia with target LDL less than 100    Post menopausal problems    Claustrophobia    Difficulty waking    Morbid obesity with BMI of 40.0-44.9, adult (HCC)    ARTIS on CPAP    Insomnia    Herminia bullosa    Refractory obstruction of nasal airway    Chronic rhinosinusitis    Seasonal allergic rhinitis due to pollen     Past Surgical History:   Procedure Laterality Date    COLONOSCOPY  2007    [facility]isser    EYE SURGERY Right 2019    Cataract Surgery with Dr. Margarito Ortez Left 2019    Cataract Surgery with Dr. Sabino Zuleta Left     Dr Jonah Hernandez Left 2018    COLONOSCOPY SCREENING performed by [place] DO at [facility]     Social History     Tobacco Use    Smoking status: Former     Packs/day: 0.75     Years: 5.00     Pack years: 3.75     Types: Cigarettes     Quit date: 1996     Years since quittin.0    Smokeless tobacco: Never    Tobacco comments:     quit    Vaping Use    Vaping Use: Never used   Substance Use Topics    Alcohol use: Yes     Comment: socially    Drug use: No       Review of Systems   Constitutional: Negative. HENT:  Positive for congestion, postnasal drip, rhinorrhea, sinus pressure and sinus pain. Respiratory: Negative. Cardiovascular: Negative. Gastrointestinal: Negative.

## 2023-05-30 RX ORDER — LEVOFLOXACIN 750 MG/1
750 TABLET ORAL DAILY
Qty: 5 TABLET | Refills: 0 | Status: SHIPPED | OUTPATIENT
Start: 2023-05-30 | End: 2023-06-04

## 2023-06-19 ENCOUNTER — OFFICE VISIT (OUTPATIENT)
Dept: CARDIOLOGY CLINIC | Age: 78
End: 2023-06-19
Payer: MEDICARE

## 2023-06-19 VITALS
DIASTOLIC BLOOD PRESSURE: 78 MMHG | HEIGHT: 68 IN | BODY MASS INDEX: 39.86 KG/M2 | HEART RATE: 82 BPM | WEIGHT: 263 LBS | SYSTOLIC BLOOD PRESSURE: 122 MMHG

## 2023-06-19 DIAGNOSIS — Z01.818 PRE-OP EXAM: Primary | ICD-10-CM

## 2023-06-19 DIAGNOSIS — I10 PRIMARY HYPERTENSION: ICD-10-CM

## 2023-06-19 DIAGNOSIS — R94.31 EKG, ABNORMAL: ICD-10-CM

## 2023-06-19 PROCEDURE — 3078F DIAST BP <80 MM HG: CPT | Performed by: NUCLEAR MEDICINE

## 2023-06-19 PROCEDURE — 93000 ELECTROCARDIOGRAM COMPLETE: CPT | Performed by: NUCLEAR MEDICINE

## 2023-06-19 PROCEDURE — 1123F ACP DISCUSS/DSCN MKR DOCD: CPT | Performed by: NUCLEAR MEDICINE

## 2023-06-19 PROCEDURE — 3074F SYST BP LT 130 MM HG: CPT | Performed by: NUCLEAR MEDICINE

## 2023-06-19 PROCEDURE — 99213 OFFICE O/P EST LOW 20 MIN: CPT | Performed by: NUCLEAR MEDICINE

## 2023-06-19 NOTE — PROGRESS NOTES
[address] [facility] [address]  [place] [address]  Dept: [phone]  Dept Fax: [phone]  Loc: [phone]    Visit Date: 2023    Patricia Murcia is a 69 y.o. female who presents todayfor:  Chief Complaint   Patient presents with    Cardiac Clearance    Hypertension     Here for evaluation   Going for sinus operation   No chest pain   No changes in breathing  BP is stable  No dizziness  No syncope  Risk for CAD      HPI:  HPI  Past Medical History:   Diagnosis Date    Allergic rhinitis     Chronic kidney disease     Claustrophobia 2016    Hyperlipidemia     Obesity     ARTIS on CPAP       Past Surgical History:   Procedure Laterality Date    COLONOSCOPY  2007    Wisser    EYE SURGERY Right 2019    Cataract Surgery with Dr. Toan Pelayo Left 2019    Cataract Surgery with Dr. Elizabeth Encarnacion Left     Dr Jose Tolbert Left 2018    COLONOSCOPY SCREENING performed by Franco Regan DO at [facility]     Family History   Problem Relation Age of Onset    Dementia Mother     Arthritis Mother     Diabetes Father      Social History     Tobacco Use    Smoking status: Former     Packs/day: 0.75     Years: 5.00     Pack years: 3.75     Types: Cigarettes     Quit date: 1996     Years since quittin.1    Smokeless tobacco: Never    Tobacco comments:     quit    Substance Use Topics    Alcohol use: Yes     Comment: socially      Current Outpatient Medications   Medication Sig Dispense Refill    NONFORMULARY AREDS      SYNTHROID 150 MCG tablet Take 1 tablet by mouth daily Take with water on an empty stomach- wait 30 minutes before eating or taking other meds.  90 tablet 3    Lactobacillus (ACIDOPHILUS) 100 MG CAPS Take by mouth      triamcinolone (NASACORT) 55 MCG/ACT nasal inhaler 2 sprays by Nasal route daily as needed      vitamin B-12 (CYANOCOBALAMIN) 500 MCG

## 2023-06-19 NOTE — PROGRESS NOTES
Needs cardiac clearance for davonte bullosa resection with Dr. Neda Puentes on 7-13-23. EKG done today. Patient denies any cardiac symptoms.

## 2023-06-27 ENCOUNTER — HOSPITAL ENCOUNTER (OUTPATIENT)
Age: 78
Discharge: HOME | End: 2023-06-27
Payer: MEDICARE

## 2023-06-27 ENCOUNTER — HOSPITAL ENCOUNTER (OUTPATIENT)
Dept: GENERAL RADIOLOGY | Age: 78
Discharge: HOME | End: 2023-06-27
Payer: MEDICARE

## 2023-06-27 ENCOUNTER — PATIENT MESSAGE (OUTPATIENT)
Dept: FAMILY MEDICINE CLINIC | Age: 78
End: 2023-06-27

## 2023-06-27 ENCOUNTER — NURSE ONLY (OUTPATIENT)
Dept: LAB | Age: 78
End: 2023-06-27

## 2023-06-27 DIAGNOSIS — Z01.818 PRE-OP TESTING: ICD-10-CM

## 2023-06-27 LAB
ALBUMIN SERPL BCG-MCNC: 3.9 G/DL (ref 3.5–5.1)
ALP SERPL-CCNC: 51 U/L (ref 38–126)
ALT SERPL W/O P-5'-P-CCNC: 12 U/L (ref 11–66)
ANION GAP SERPL CALC-SCNC: 9 MEQ/L (ref 8–16)
AST SERPL-CCNC: 18 U/L (ref 5–40)
BASOPHILS ABSOLUTE: 0.1 THOU/MM3 (ref 0–0.1)
BASOPHILS NFR BLD AUTO: 0.8 %
BILIRUB SERPL-MCNC: 0.4 MG/DL (ref 0.3–1.2)
BUN SERPL-MCNC: 12 MG/DL (ref 7–22)
CALCIUM SERPL-MCNC: 9.3 MG/DL (ref 8.5–10.5)
CHLORIDE SERPL-SCNC: 104 MEQ/L (ref 98–111)
CO2 SERPL-SCNC: 26 MEQ/L (ref 23–33)
CREAT SERPL-MCNC: 0.7 MG/DL (ref 0.4–1.2)
DEPRECATED RDW RBC AUTO: 45.9 FL (ref 35–45)
EOSINOPHIL NFR BLD AUTO: 2.2 %
EOSINOPHILS ABSOLUTE: 0.2 THOU/MM3 (ref 0–0.4)
ERYTHROCYTE [DISTWIDTH] IN BLOOD BY AUTOMATED COUNT: 12.7 % (ref 11.5–14.5)
GFR SERPL CREATININE-BSD FRML MDRD: > 60 ML/MIN/1.73M2
GLUCOSE SERPL-MCNC: 94 MG/DL (ref 70–108)
HCT VFR BLD AUTO: 45.5 % (ref 37–47)
HGB BLD-MCNC: 14.6 GM/DL (ref 12–16)
IMM GRANULOCYTES # BLD AUTO: 0.02 THOU/MM3 (ref 0–0.07)
IMM GRANULOCYTES NFR BLD AUTO: 0.2 %
LYMPHOCYTES ABSOLUTE: 2.4 THOU/MM3 (ref 1–4.8)
LYMPHOCYTES NFR BLD AUTO: 28.8 %
MCH RBC QN AUTO: 31.7 PG (ref 26–33)
MCHC RBC AUTO-ENTMCNC: 32.1 GM/DL (ref 32.2–35.5)
MCV RBC AUTO: 98.7 FL (ref 81–99)
MONOCYTES ABSOLUTE: 0.7 THOU/MM3 (ref 0.4–1.3)
MONOCYTES NFR BLD AUTO: 8.7 %
NEUTROPHILS NFR BLD AUTO: 59.3 %
NRBC BLD AUTO-RTO: 0 /100 WBC
PLATELET # BLD AUTO: 232 THOU/MM3 (ref 130–400)
PMV BLD AUTO: 11.2 FL (ref 9.4–12.4)
POTASSIUM SERPL-SCNC: 4.3 MEQ/L (ref 3.5–5.2)
PROT SERPL-MCNC: 6.6 G/DL (ref 6.1–8)
RBC # BLD AUTO: 4.61 MILL/MM3 (ref 4.2–5.4)
SEGMENTED NEUTROPHILS ABSOLUTE COUNT: 4.9 THOU/MM3 (ref 1.8–7.7)
SODIUM SERPL-SCNC: 139 MEQ/L (ref 135–145)
WBC # BLD AUTO: 8.2 THOU/MM3 (ref 4.8–10.8)

## 2023-06-27 PROCEDURE — 71046 X-RAY EXAM CHEST 2 VIEWS: CPT

## 2023-06-27 RX ORDER — LEVOFLOXACIN 750 MG/1
750 TABLET ORAL DAILY
Qty: 5 TABLET | Refills: 0 | Status: SHIPPED | OUTPATIENT
Start: 2023-06-27 | End: 2023-07-02

## 2023-06-29 DIAGNOSIS — E03.9 HYPOTHYROIDISM, UNSPECIFIED TYPE: ICD-10-CM

## 2023-06-30 RX ORDER — LEVOTHYROXINE SODIUM 0.15 MG/1
TABLET ORAL
Qty: 90 TABLET | Refills: 3 | Status: SHIPPED | OUTPATIENT
Start: 2023-06-30

## 2023-07-05 ENCOUNTER — PREP FOR PROCEDURE (OUTPATIENT)
Dept: ENT CLINIC | Age: 78
End: 2023-07-05

## 2023-07-06 RX ORDER — KETOCONAZOLE 20 MG/ML
1 SHAMPOO TOPICAL WEEKLY
COMMUNITY
Start: 2023-05-16

## 2023-07-06 RX ORDER — CETIRIZINE HYDROCHLORIDE 10 MG/1
1 CAPSULE, LIQUID FILLED ORAL DAILY
COMMUNITY

## 2023-07-12 RX ORDER — SODIUM CHLORIDE 0.9 % (FLUSH) 0.9 %
5-40 SYRINGE (ML) INJECTION PRN
Status: CANCELLED | OUTPATIENT
Start: 2023-07-12

## 2023-07-12 RX ORDER — IPRATROPIUM BROMIDE AND ALBUTEROL SULFATE 2.5; .5 MG/3ML; MG/3ML
1 SOLUTION RESPIRATORY (INHALATION) EVERY 4 HOURS PRN
Status: CANCELLED | OUTPATIENT
Start: 2023-07-13

## 2023-07-12 RX ORDER — SODIUM CHLORIDE 0.9 % (FLUSH) 0.9 %
5-40 SYRINGE (ML) INJECTION EVERY 12 HOURS SCHEDULED
Status: CANCELLED | OUTPATIENT
Start: 2023-07-12

## 2023-07-12 RX ORDER — SODIUM CHLORIDE 9 MG/ML
INJECTION, SOLUTION INTRAVENOUS PRN
Status: CANCELLED | OUTPATIENT
Start: 2023-07-12

## 2023-07-13 ENCOUNTER — HOSPITAL ENCOUNTER (OUTPATIENT)
Age: 78
Setting detail: OUTPATIENT SURGERY
Discharge: HOME | End: 2023-07-13
Attending: OTOLARYNGOLOGY | Admitting: OTOLARYNGOLOGY
Payer: MEDICARE

## 2023-07-13 ENCOUNTER — ANESTHESIA EVENT (OUTPATIENT)
Dept: OPERATING ROOM | Age: 78
End: 2023-07-13
Payer: MEDICARE

## 2023-07-13 ENCOUNTER — ANESTHESIA (OUTPATIENT)
Dept: OPERATING ROOM | Age: 78
End: 2023-07-13
Payer: MEDICARE

## 2023-07-13 VITALS
OXYGEN SATURATION: 95 % | HEART RATE: 65 BPM | RESPIRATION RATE: 16 BRPM | DIASTOLIC BLOOD PRESSURE: 63 MMHG | HEIGHT: 68 IN | SYSTOLIC BLOOD PRESSURE: 140 MMHG | BODY MASS INDEX: 40.19 KG/M2 | WEIGHT: 265.2 LBS | TEMPERATURE: 97.1 F

## 2023-07-13 DIAGNOSIS — J34.89 CONCHA BULLOSA: ICD-10-CM

## 2023-07-13 DIAGNOSIS — J32.9 CHRONIC RHINOSINUSITIS: ICD-10-CM

## 2023-07-13 DIAGNOSIS — J30.1 SEASONAL ALLERGIC RHINITIS DUE TO POLLEN: ICD-10-CM

## 2023-07-13 DIAGNOSIS — G89.18 ACUTE POST-OPERATIVE PAIN: Primary | ICD-10-CM

## 2023-07-13 DIAGNOSIS — J34.89 REFRACTORY OBSTRUCTION OF NASAL AIRWAY: ICD-10-CM

## 2023-07-13 DIAGNOSIS — J31.0 CHRONIC RHINOSINUSITIS: ICD-10-CM

## 2023-07-13 PROCEDURE — 2500000003 HC RX 250 WO HCPCS: Performed by: NURSE ANESTHETIST, CERTIFIED REGISTERED

## 2023-07-13 PROCEDURE — 3700000000 HC ANESTHESIA ATTENDED CARE: Performed by: OTOLARYNGOLOGY

## 2023-07-13 PROCEDURE — 7100000011 HC PHASE II RECOVERY - ADDTL 15 MIN: Performed by: OTOLARYNGOLOGY

## 2023-07-13 PROCEDURE — 2709999900 HC NON-CHARGEABLE SUPPLY: Performed by: OTOLARYNGOLOGY

## 2023-07-13 PROCEDURE — 7100000010 HC PHASE II RECOVERY - FIRST 15 MIN: Performed by: OTOLARYNGOLOGY

## 2023-07-13 PROCEDURE — C9399 UNCLASSIFIED DRUGS OR BIOLOG: HCPCS | Performed by: NURSE ANESTHETIST, CERTIFIED REGISTERED

## 2023-07-13 PROCEDURE — 6370000000 HC RX 637 (ALT 250 FOR IP): Performed by: NURSE ANESTHETIST, CERTIFIED REGISTERED

## 2023-07-13 PROCEDURE — 6370000000 HC RX 637 (ALT 250 FOR IP): Performed by: OTOLARYNGOLOGY

## 2023-07-13 PROCEDURE — 31240 NSL/SNS NDSC CNCH BULL RESCJ: CPT | Performed by: OTOLARYNGOLOGY

## 2023-07-13 PROCEDURE — 7100000001 HC PACU RECOVERY - ADDTL 15 MIN: Performed by: OTOLARYNGOLOGY

## 2023-07-13 PROCEDURE — 7100000000 HC PACU RECOVERY - FIRST 15 MIN: Performed by: OTOLARYNGOLOGY

## 2023-07-13 PROCEDURE — 6360000002 HC RX W HCPCS: Performed by: NURSE ANESTHETIST, CERTIFIED REGISTERED

## 2023-07-13 PROCEDURE — 2580000003 HC RX 258: Performed by: OTOLARYNGOLOGY

## 2023-07-13 PROCEDURE — 2500000003 HC RX 250 WO HCPCS: Performed by: OTOLARYNGOLOGY

## 2023-07-13 PROCEDURE — 2720000010 HC SURG SUPPLY STERILE: Performed by: OTOLARYNGOLOGY

## 2023-07-13 PROCEDURE — 3600000014 HC SURGERY LEVEL 4 ADDTL 15MIN: Performed by: OTOLARYNGOLOGY

## 2023-07-13 PROCEDURE — 3600000004 HC SURGERY LEVEL 4 BASE: Performed by: OTOLARYNGOLOGY

## 2023-07-13 PROCEDURE — 6360000002 HC RX W HCPCS: Performed by: OTOLARYNGOLOGY

## 2023-07-13 PROCEDURE — 88305 TISSUE EXAM BY PATHOLOGIST: CPT

## 2023-07-13 PROCEDURE — 3700000001 HC ADD 15 MINUTES (ANESTHESIA): Performed by: OTOLARYNGOLOGY

## 2023-07-13 PROCEDURE — APPNB45 APP NON BILLABLE 31-45 MINUTES: Performed by: NURSE PRACTITIONER

## 2023-07-13 RX ORDER — NALOXONE HYDROCHLORIDE 0.4 MG/ML
INJECTION, SOLUTION INTRAMUSCULAR; INTRAVENOUS; SUBCUTANEOUS PRN
Status: DISCONTINUED | OUTPATIENT
Start: 2023-07-13 | End: 2023-07-13 | Stop reason: SDUPTHER

## 2023-07-13 RX ORDER — ROCURONIUM BROMIDE 10 MG/ML
INJECTION, SOLUTION INTRAVENOUS PRN
Status: DISCONTINUED | OUTPATIENT
Start: 2023-07-13 | End: 2023-07-13 | Stop reason: SDUPTHER

## 2023-07-13 RX ORDER — LIDOCAINE HYDROCHLORIDE 40 MG/ML
SOLUTION TOPICAL PRN
Status: DISCONTINUED | OUTPATIENT
Start: 2023-07-13 | End: 2023-07-13 | Stop reason: SDUPTHER

## 2023-07-13 RX ORDER — PROPOFOL 10 MG/ML
INJECTION, EMULSION INTRAVENOUS PRN
Status: DISCONTINUED | OUTPATIENT
Start: 2023-07-13 | End: 2023-07-13 | Stop reason: SDUPTHER

## 2023-07-13 RX ORDER — SODIUM CHLORIDE 0.9 % (FLUSH) 0.9 %
5-40 SYRINGE (ML) INJECTION PRN
Status: DISCONTINUED | OUTPATIENT
Start: 2023-07-13 | End: 2023-07-13 | Stop reason: HOSPADM

## 2023-07-13 RX ORDER — SODIUM CHLORIDE 9 MG/ML
INJECTION, SOLUTION INTRAVENOUS PRN
Status: DISCONTINUED | OUTPATIENT
Start: 2023-07-13 | End: 2023-07-13 | Stop reason: HOSPADM

## 2023-07-13 RX ORDER — METOPROLOL TARTRATE 5 MG/5ML
INJECTION INTRAVENOUS PRN
Status: DISCONTINUED | OUTPATIENT
Start: 2023-07-13 | End: 2023-07-13 | Stop reason: SDUPTHER

## 2023-07-13 RX ORDER — MINERAL OIL AND WHITE PETROLATUM 150; 830 MG/G; MG/G
OINTMENT OPHTHALMIC PRN
Status: DISCONTINUED | OUTPATIENT
Start: 2023-07-13 | End: 2023-07-13 | Stop reason: SDUPTHER

## 2023-07-13 RX ORDER — SODIUM CHLORIDE 0.9 % (FLUSH) 0.9 %
5-40 SYRINGE (ML) INJECTION EVERY 12 HOURS SCHEDULED
Status: DISCONTINUED | OUTPATIENT
Start: 2023-07-13 | End: 2023-07-13 | Stop reason: HOSPADM

## 2023-07-13 RX ORDER — DEXAMETHASONE SODIUM PHOSPHATE 4 MG/ML
10 INJECTION, SOLUTION INTRA-ARTICULAR; INTRALESIONAL; INTRAMUSCULAR; INTRAVENOUS; SOFT TISSUE
Status: COMPLETED | OUTPATIENT
Start: 2023-07-13 | End: 2023-07-13

## 2023-07-13 RX ORDER — TRANEXAMIC ACID 100 MG/ML
INJECTION, SOLUTION INTRAVENOUS PRN
Status: DISCONTINUED | OUTPATIENT
Start: 2023-07-13 | End: 2023-07-13 | Stop reason: ALTCHOICE

## 2023-07-13 RX ORDER — FENTANYL CITRATE 50 UG/ML
INJECTION, SOLUTION INTRAMUSCULAR; INTRAVENOUS PRN
Status: DISCONTINUED | OUTPATIENT
Start: 2023-07-13 | End: 2023-07-13 | Stop reason: SDUPTHER

## 2023-07-13 RX ORDER — LIDOCAINE HYDROCHLORIDE 20 MG/ML
INJECTION, SOLUTION INTRAVENOUS PRN
Status: DISCONTINUED | OUTPATIENT
Start: 2023-07-13 | End: 2023-07-13 | Stop reason: SDUPTHER

## 2023-07-13 RX ORDER — ONDANSETRON 2 MG/ML
INJECTION INTRAMUSCULAR; INTRAVENOUS PRN
Status: DISCONTINUED | OUTPATIENT
Start: 2023-07-13 | End: 2023-07-13 | Stop reason: SDUPTHER

## 2023-07-13 RX ORDER — HYDRALAZINE HYDROCHLORIDE 20 MG/ML
10 INJECTION INTRAMUSCULAR; INTRAVENOUS
Status: DISCONTINUED | OUTPATIENT
Start: 2023-07-13 | End: 2023-07-13 | Stop reason: HOSPADM

## 2023-07-13 RX ORDER — HYDROCODONE BITARTRATE AND ACETAMINOPHEN 5; 325 MG/1; MG/1
1 TABLET ORAL EVERY 6 HOURS PRN
Qty: 20 TABLET | Refills: 0 | Status: SHIPPED | OUTPATIENT
Start: 2023-07-13 | End: 2023-07-18

## 2023-07-13 RX ORDER — IPRATROPIUM BROMIDE AND ALBUTEROL SULFATE 2.5; .5 MG/3ML; MG/3ML
1 SOLUTION RESPIRATORY (INHALATION) EVERY 4 HOURS PRN
Status: DISCONTINUED | OUTPATIENT
Start: 2023-07-13 | End: 2023-07-13 | Stop reason: HOSPADM

## 2023-07-13 RX ORDER — LABETALOL HYDROCHLORIDE 5 MG/ML
10 INJECTION, SOLUTION INTRAVENOUS
Status: DISCONTINUED | OUTPATIENT
Start: 2023-07-13 | End: 2023-07-13 | Stop reason: HOSPADM

## 2023-07-13 RX ORDER — OXYMETAZOLINE HYDROCHLORIDE 0.05 G/100ML
SPRAY NASAL PRN
Status: DISCONTINUED | OUTPATIENT
Start: 2023-07-13 | End: 2023-07-13 | Stop reason: ALTCHOICE

## 2023-07-13 RX ORDER — HYDROMORPHONE HCL 110MG/55ML
PATIENT CONTROLLED ANALGESIA SYRINGE INTRAVENOUS PRN
Status: DISCONTINUED | OUTPATIENT
Start: 2023-07-13 | End: 2023-07-13 | Stop reason: SDUPTHER

## 2023-07-13 RX ORDER — ONDANSETRON 2 MG/ML
4 INJECTION INTRAMUSCULAR; INTRAVENOUS
Status: DISCONTINUED | OUTPATIENT
Start: 2023-07-13 | End: 2023-07-13 | Stop reason: HOSPADM

## 2023-07-13 RX ORDER — CIPROFLOXACIN 500 MG/1
500 TABLET, FILM COATED ORAL 2 TIMES DAILY
Qty: 14 TABLET | Refills: 0 | Status: SHIPPED | OUTPATIENT
Start: 2023-07-13 | End: 2023-07-20

## 2023-07-13 RX ADMIN — METOPROLOL TARTRATE 2.5 MG: 5 INJECTION, SOLUTION INTRAVENOUS at 08:09

## 2023-07-13 RX ADMIN — DEXAMETHASONE SODIUM PHOSPHATE 10 MG: 4 INJECTION, SOLUTION INTRA-ARTICULAR; INTRALESIONAL; INTRAMUSCULAR; INTRAVENOUS; SOFT TISSUE at 06:59

## 2023-07-13 RX ADMIN — HYDROMORPHONE HYDROCHLORIDE 1 MG: 2 INJECTION INTRAMUSCULAR; INTRAVENOUS; SUBCUTANEOUS at 08:42

## 2023-07-13 RX ADMIN — SUGAMMADEX 200 MG: 100 INJECTION, SOLUTION INTRAVENOUS at 09:28

## 2023-07-13 RX ADMIN — LIDOCAINE HYDROCHLORIDE 100 MG: 20 INJECTION, SOLUTION INTRAVENOUS at 07:42

## 2023-07-13 RX ADMIN — LIDOCAINE HYDROCHLORIDE 4 ML: 40 SOLUTION TOPICAL at 07:42

## 2023-07-13 RX ADMIN — ONDANSETRON 4 MG: 2 INJECTION INTRAMUSCULAR; INTRAVENOUS at 08:00

## 2023-07-13 RX ADMIN — ROCURONIUM BROMIDE 50 MG: 10 INJECTION INTRAVENOUS at 07:42

## 2023-07-13 RX ADMIN — FENTANYL CITRATE 100 MCG: 50 INJECTION, SOLUTION INTRAMUSCULAR; INTRAVENOUS at 07:40

## 2023-07-13 RX ADMIN — HYDROMORPHONE HYDROCHLORIDE 1 MG: 2 INJECTION INTRAMUSCULAR; INTRAVENOUS; SUBCUTANEOUS at 08:37

## 2023-07-13 RX ADMIN — FENTANYL CITRATE 50 MCG: 50 INJECTION, SOLUTION INTRAMUSCULAR; INTRAVENOUS at 08:11

## 2023-07-13 RX ADMIN — PIPERACILLIN SODIUM,TAZOBACTAM SODIUM 3375 MG: 3; .375 INJECTION, POWDER, FOR SOLUTION INTRAVENOUS at 07:51

## 2023-07-13 RX ADMIN — SODIUM CHLORIDE: 9 INJECTION, SOLUTION INTRAVENOUS at 06:47

## 2023-07-13 RX ADMIN — MINERAL OIL AND WHITE PETROLATUM 1 APPLICATOR: 150; 830 OINTMENT OPHTHALMIC at 07:48

## 2023-07-13 RX ADMIN — NALOXONE HYDROCHLORIDE 0.2 MG: 0.4 INJECTION, SOLUTION INTRAMUSCULAR; INTRAVENOUS; SUBCUTANEOUS at 09:41

## 2023-07-13 RX ADMIN — PROPOFOL 200 MG: 10 INJECTION, EMULSION INTRAVENOUS at 07:42

## 2023-07-13 RX ADMIN — METOPROLOL TARTRATE 2.5 MG: 5 INJECTION, SOLUTION INTRAVENOUS at 08:17

## 2023-07-13 RX ADMIN — FENTANYL CITRATE 50 MCG: 50 INJECTION, SOLUTION INTRAMUSCULAR; INTRAVENOUS at 08:17

## 2023-07-13 ASSESSMENT — PAIN SCALES - GENERAL
PAINLEVEL_OUTOF10: 0

## 2023-07-13 ASSESSMENT — PAIN - FUNCTIONAL ASSESSMENT: PAIN_FUNCTIONAL_ASSESSMENT: 0-10

## 2023-07-13 NOTE — PROGRESS NOTES
Pt returned to [facility] room [facility]. Vitals and assessment as charted. 0.9 infusing, @200ml to count from PACU. Pt has jello and gingerale. Family at the bedside. Pt and family verbalized understanding of discharge criteria and call light use. Call light in reach.

## 2023-07-13 NOTE — DISCHARGE INSTRUCTIONS
Instructions specific for  from Dr. Kristine Gordon:  1. Rest; sleep propped up or in an easy chair for the next 3 nights  2. No nose blowing whatsoever until after your follow up appointment  3. Begin rinsing your sinuses on Sunday. Do this twice daily. 4.  Change the nasal drip pad twice a day and as needed for saturation. 5.  Brisk bleeding is unlikely but should it occur lean forward to allow it to drain out of your nose into a container so the amount you bleed can be quantified. This will also protect you from swallowing the blood which will make you sick and potentially complicate your care if you need anesthesia to undergo a cautery procedure. If this occurs and is persistent, come to the emergency room and have me or one of my partners called. 6.  You should not need to take your nasal steroid spray, but you can restart this next week if you need to do so. For emergencies:  Abdulaziz Davenport.  Katey Otero, [address] [place] [address]  Cell: [phone]

## 2023-07-13 NOTE — PROGRESS NOTES
Pt admitted to [facility] room 12 and oriented to unit. SCD sleeves applied. Nares swabbed. Pt verbalized permission for first name, last initial and physicians name on white board. SDS board and discharge criteria explained, pt and family verbalized understanding. Pt denies thoughts of harming self or others. Call light in reach. Family at the bedside.

## 2023-07-13 NOTE — PROGRESS NOTES
2336: Pt arrives to pacu, awakens to verbal stimuli. Pt on 8L simple mask, respirations easy and unlabored. Currently denies pain. VSS  1000: Placed pt on face tent at this time, tolerated well  1010: Pt resting off and on with eyes closed, easily awakens to voice. Continues to deny pain  1017: Pt meets criteria for discharge from pacu, transported to [facility] in stalbe condition.  VSS

## 2023-07-13 NOTE — ANESTHESIA POSTPROCEDURE EVALUATION
Department of Anesthesiology  Postprocedure Note    Patient:  Geovanna Boyce  MRN: [id number]  YOB: 1945  Date of evaluation: 7/13/2023      Procedure Summary     Date: 07/13/23 Room / Location: Rahul Masc[facility] 01 / Rahulpartha Mascorro    Anesthesia Start: 2387 Anesthesia Stop: Aimee Singletary    Procedure: Herminia Bullosa Resection (Nose) Diagnosis:       Refractory obstruction of nasal airway      Herminia bullosa      Chronic rhinosinusitis      Seasonal allergic rhinitis due to pollen      (Refractory obstruction of nasal airway [J34.89])      (Herminia bullosa [J34.89])      (Chronic rhinosinusitis [J31.0, J32.9])      (Seasonal allergic rhinitis due to pollen [J30.1])    Surgeons: Kristine Gordon MD Responsible Provider: Britton Cardenas MD    Anesthesia Type: General ASA Status: 3          Anesthesia Type: General    Allen Phase I: Allen Score: 8    Allen Phase II: Allen Score: 9      Anesthesia Post Evaluation    Patient location during evaluation: PACU  Patient participation: complete - patient participated  Level of consciousness: awake and alert  Airway patency: patent  Nausea & Vomiting: no nausea  Complications: no  Cardiovascular status: blood pressure returned to baseline and hemodynamically stable  Respiratory status: acceptable and spontaneous ventilation  Hydration status: euvolemic

## 2023-07-13 NOTE — PROGRESS NOTES
Pt has met discharge criteria and states she is ready for discharge to home. IV removed, gauze and tape applied. Dressed in own clothes and personal belongings gathered. Discharge instructions (with opioid medication education information) given to pt and family; pt and family verbalized understanding of discharge instructions, prescriptions and follow up appointments. Pt transported to discharge lobby by Janina Wylie Principal [facility] staff.

## 2023-07-13 NOTE — ANESTHESIA PRE PROCEDURE
Department of Anesthesiology  Preprocedure Note       Name:  Christian Ho   Age:  69 y.o.  :  1945                                          MRN:  [id number]         Date:  2023      Surgeon: Maria Luisa Mario):  Dimitry Lama MD    Procedure: Procedure(s):  Iris Sermons Resection    Medications prior to admission:   Prior to Admission medications    Medication Sig Start Date End Date Taking? Authorizing Provider   Fexofenadine HCl (MUCINEX ALLERGY PO) Take 1 tablet by mouth daily   Yes Historical Provider, MD   Cetirizine HCl (ZYRTEC ALLERGY) 10 MG CAPS Take 1 tablet by mouth daily   Yes Historical Provider, MD   UNABLE TO FIND Take 1 tablet by mouth daily Herbal ENT   Yes Historical Provider, MD   ketoconazole (NIZORAL) 2 % shampoo Apply 1 application topically once a week 23   Historical Provider, MD   levothyroxine (SYNTHROID) 150 MCG tablet TAKE 1 TABLET DAILY WITH WATER ON AN EMPTY STOMACH - WAIT 30 MINUTES BEFORE EATING OR TAKING OTHER MEDICATIONS 23   Jerry Pinch, DO   NONFORMULARY Take 2 tablets by mouth daily AREDS    Historical Provider, MD   Lactobacillus (ACIDOPHILUS) 100 MG CAPS Take 1 capsule by mouth daily    Historical Provider, MD   triamcinolone (NASACORT) 55 MCG/ACT nasal inhaler 2 sprays by Nasal route daily as needed    Historical Provider, MD   vitamin B-12 (CYANOCOBALAMIN) 500 MCG tablet Take 1 tablet by mouth daily Indications: 2500mg a day    Historical Provider, MD   Niacin (VITAMIN B-3 PO) Take 1 tablet by mouth daily    Historical Provider, MD   Magnesium 500 MG CAPS Take 1 caplet by mouth daily    Historical Provider, MD   vitamin E 400 UNIT capsule Take 1 capsule by mouth daily    Historical Provider, MD   Cholecalciferol (VITAMIN D3) 03951 UNITS CAPS Take 10,000 Units by mouth daily.     Historical Provider, MD   therapeutic multivitamin-minerals (THERAGRAN-M) tablet Take 1 tablet by mouth daily    Historical Provider, MD       Current medications:

## 2023-07-13 NOTE — OP NOTE
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]     * Chronic rhinosinusitis [J31.0, J32.9]     * Seasonal allergic rhinitis due to pollen [J30.1]    Post-Op Diagnosis: Same       Procedure(s):  Davonte Bullosa Resection    Surgeon(s):  Davey Parker MD    Assistant:   * No surgical staff found *    Anesthesia: General    Estimated Blood Loss (mL): 360     Complications: None    Specimens:   ID Type Source Tests Collected by Time Destination   A : Right [facility] [address], MD 7/13/2023 8869    B : Left [address] [place] [address] [address], [place] 7/13/2023 0848        Implants:  * No implants in log *      Drains: * No LDAs found *    Findings: 1. Bilaterally markedly enlarged middle turbinates  2. Left nasal airway crowded secondary to left nasal septal deviation making access difficult to the left side  3. Following the removal of both davonte bullosa, both nasal airways are markedly enlarged        Detailed Description of Procedure: The patient was taken to the operating room awake and placed in supine position. General anesthesia was induced and the patient was intubated with a 7.0 endotracheal tube without difficulty or vital sign instability. The table was turned 90 degrees and the patient was prepped and draped in the usual fashion for an aseptic approach to the nasal airway. I performed a timeout verifying the patient's identity and planned procedure. Using a 30 degree optical telescope to carefully examine both nasal airways and found the left side to be very crowded with a substantial difficulty even passing the 5 mm telescope into the region of the left davonte bullosa. I placed Afrin pledgets x2 into both nasal airways for vasoconstriction.   This

## 2023-07-24 ENCOUNTER — OFFICE VISIT (OUTPATIENT)
Dept: ENT CLINIC | Age: 78
End: 2023-07-24
Payer: MEDICARE

## 2023-07-24 VITALS
WEIGHT: 260.9 LBS | DIASTOLIC BLOOD PRESSURE: 68 MMHG | HEART RATE: 92 BPM | SYSTOLIC BLOOD PRESSURE: 122 MMHG | OXYGEN SATURATION: 92 % | TEMPERATURE: 97.9 F | BODY MASS INDEX: 39.54 KG/M2 | RESPIRATION RATE: 20 BRPM | HEIGHT: 68 IN

## 2023-07-24 DIAGNOSIS — J32.9 CHRONIC RHINOSINUSITIS: ICD-10-CM

## 2023-07-24 DIAGNOSIS — Z98.890 STATUS POST FUNCTIONAL ENDOSCOPIC SINUS SURGERY: ICD-10-CM

## 2023-07-24 DIAGNOSIS — J34.89 REFRACTORY OBSTRUCTION OF NASAL AIRWAY: Primary | ICD-10-CM

## 2023-07-24 DIAGNOSIS — J34.89 CONCHA BULLOSA: ICD-10-CM

## 2023-07-24 DIAGNOSIS — J31.0 CHRONIC RHINOSINUSITIS: ICD-10-CM

## 2023-07-24 DIAGNOSIS — J30.1 SEASONAL ALLERGIC RHINITIS DUE TO POLLEN: ICD-10-CM

## 2023-07-24 PROCEDURE — 99024 POSTOP FOLLOW-UP VISIT: CPT | Performed by: OTOLARYNGOLOGY

## 2023-07-24 PROCEDURE — 31237 NSL/SINS NDSC SURG BX POLYPC: CPT | Performed by: OTOLARYNGOLOGY

## 2023-07-24 NOTE — PROGRESS NOTES
[facility] [place] [facility]  [facility] EAR, NOSE AND THROAT  [address] [facility]  Dept: [phone]  Dept Fax: [phone]  Loc: Lazarus Krueger is a 69 y.o. female who was referred by No ref. provider found for:  Chief Complaint   Patient presents with    Post-Op Check     Patient is here post op davonte bullosa 07/13/23. Patient said she still has no energy, some difficult breathing, and low grade fever. Follow-up       HPI:     Abdifatah Acosta is a 69 y.o. female this post bilateral davonte bullosa resection for chronic nasal obstruction and rhinosinusitis. She reports having not had any improvement in her energy levels and found it difficult if not unpleasant to be unable to blow her nose given normal postop recommendations. She was having occasional chills and constitutional symptoms preoperatively and for a few days after the procedure did not have them. They have since recurred. In addition her level of fatigue, also improved around the time of surgery, has gone back to her preop level of chronic fatigue. She has avoided exercise during this interval concerned that she might cause bleeding problems. She has had no bleeding problems during this recovery so far.      History:     No Known Allergies  Current Outpatient Medications   Medication Sig Dispense Refill    ketoconazole (NIZORAL) 2 % shampoo Apply 1 application topically once a week      Fexofenadine HCl (MUCINEX ALLERGY PO) Take 1 tablet by mouth daily      Cetirizine HCl (ZYRTEC ALLERGY) 10 MG CAPS Take 1 tablet by mouth daily      UNABLE TO FIND Take 1 tablet by mouth daily Herbal ENT      levothyroxine (SYNTHROID) 150 MCG tablet TAKE 1 TABLET DAILY WITH WATER ON AN EMPTY STOMACH - WAIT 30 MINUTES BEFORE EATING OR TAKING OTHER MEDICATIONS 90 tablet 3    NONFORMULARY Take 2 tablets by mouth daily AREDS      Lactobacillus (ACIDOPHILUS) 100 MG CAPS Take 1 capsule by mouth

## 2023-08-06 ENCOUNTER — APPOINTMENT (OUTPATIENT)
Dept: GENERAL RADIOLOGY | Age: 78
End: 2023-08-06
Payer: MEDICARE

## 2023-08-06 ENCOUNTER — HOSPITAL ENCOUNTER (EMERGENCY)
Age: 78
Discharge: HOME | End: 2023-08-06
Payer: MEDICARE

## 2023-08-06 VITALS
SYSTOLIC BLOOD PRESSURE: 136 MMHG | RESPIRATION RATE: 16 BRPM | HEART RATE: 96 BPM | OXYGEN SATURATION: 98 % | WEIGHT: 260 LBS | BODY MASS INDEX: 40.12 KG/M2 | TEMPERATURE: 97.8 F | DIASTOLIC BLOOD PRESSURE: 79 MMHG

## 2023-08-06 DIAGNOSIS — R06.00 DYSPNEA, UNSPECIFIED TYPE: Primary | ICD-10-CM

## 2023-08-06 DIAGNOSIS — J40 BRONCHITIS: ICD-10-CM

## 2023-08-06 PROCEDURE — 99213 OFFICE O/P EST LOW 20 MIN: CPT

## 2023-08-06 PROCEDURE — 71046 X-RAY EXAM CHEST 2 VIEWS: CPT

## 2023-08-06 PROCEDURE — 99213 OFFICE O/P EST LOW 20 MIN: CPT | Performed by: NURSE PRACTITIONER

## 2023-08-06 RX ORDER — ALBUTEROL SULFATE 90 UG/1
2 AEROSOL, METERED RESPIRATORY (INHALATION) 4 TIMES DAILY PRN
Qty: 18 G | Refills: 0 | Status: SHIPPED | OUTPATIENT
Start: 2023-08-06 | End: 2023-09-05

## 2023-08-06 RX ORDER — PREDNISONE 10 MG/1
10 TABLET ORAL DAILY
Qty: 14 TABLET | Refills: 0 | Status: SHIPPED | OUTPATIENT
Start: 2023-08-06 | End: 2023-08-20

## 2023-08-06 ASSESSMENT — ENCOUNTER SYMPTOMS
DIARRHEA: 0
VOMITING: 0
CHEST TIGHTNESS: 0
SHORTNESS OF BREATH: 1
COUGH: 0
STRIDOR: 0
WHEEZING: 1

## 2023-08-06 ASSESSMENT — PAIN - FUNCTIONAL ASSESSMENT: PAIN_FUNCTIONAL_ASSESSMENT: NONE - DENIES PAIN

## 2023-08-06 NOTE — DISCHARGE INSTRUCTIONS
Recommend avoiding humid, or excessively hot environments. Recommend contacting pulmonologist within the next 2 to 3 days if symptoms do not seem to be improving. Chest x-ray is negative for any acute processes.

## 2023-08-06 NOTE — ED TRIAGE NOTES
Pt to room 3 with c/o slight Shortness of breath x 5 days and seems to be worsening. She does report she had sinus surgery approx 1 month ago and reports that some atelectasis was showing up on her pre op x ray.

## 2023-08-06 NOTE — ED PROVIDER NOTES
[address]  Urgent Care Encounter       CHIEF COMPLAINT       Chief Complaint   Patient presents with    Shortness of Breath     X 5 days and is getting worse       Nurses Notes reviewed and I agree except as noted in the HPI. HISTORY OF PRESENT ILLNESS   Tamra Mc is a 69 y.o. female who presents     Patient is present in urgent care today with concerns of shortness of breath with exertion. She shares that her symptoms have been ongoing for approximately 7 to 8 days. She shares that last night she had used her 's COPD inhaler, and did find some relief. Denies any fevers, or cough associated with shortness of breath. She states that she does have history of sleep apnea, but feels this is well controlled with her CPAP device. She shares that approximately a month ago she had had a chest x-ray for preop, and was noted to have minimal atelectasis to left lung base, she is unsure if this ever resolved. REVIEW OF SYSTEMS     Review of Systems   Constitutional:  Negative for chills, fatigue and fever. HENT:  Negative for congestion and postnasal drip. Respiratory:  Positive for shortness of breath (Dyspnea with exertion) and wheezing. Negative for cough, chest tightness and stridor. Cardiovascular:  Negative for chest pain. Gastrointestinal:  Negative for diarrhea and vomiting. Skin:  Negative for rash. Neurological:  Negative for dizziness, weakness and headaches.      PAST MEDICAL HISTORY         Diagnosis Date    Allergic rhinitis     Arthritis     Chronic kidney disease     Claustrophobia 02/24/2016    Hyperlipidemia     Obesity     ARTIS on CPAP     uses cpap    Thyroid disease        SURGICALHISTORY     Patient  has a past surgical history that includes Colonoscopy (2007); knee surgery (Left, 2016); pr colon ca scrn not hi rsk ind (Left, 01/08/2018); eye surgery (Right, 02/28/2019); eye surgery (Left, 03/13/2019); knee surgery (Right, 2021); and

## 2023-08-07 ENCOUNTER — TELEPHONE (OUTPATIENT)
Dept: FAMILY MEDICINE CLINIC | Age: 78
End: 2023-08-07

## 2023-08-07 ENCOUNTER — OFFICE VISIT (OUTPATIENT)
Dept: ENT CLINIC | Age: 78
End: 2023-08-07
Payer: MEDICARE

## 2023-08-07 VITALS
TEMPERATURE: 97.3 F | SYSTOLIC BLOOD PRESSURE: 132 MMHG | HEIGHT: 68 IN | DIASTOLIC BLOOD PRESSURE: 78 MMHG | OXYGEN SATURATION: 96 % | RESPIRATION RATE: 16 BRPM | BODY MASS INDEX: 39.28 KG/M2 | WEIGHT: 259.2 LBS | HEART RATE: 79 BPM

## 2023-08-07 DIAGNOSIS — J32.9 CHRONIC RHINOSINUSITIS: ICD-10-CM

## 2023-08-07 DIAGNOSIS — J34.89 CONCHA BULLOSA: ICD-10-CM

## 2023-08-07 DIAGNOSIS — J31.0 CHRONIC RHINOSINUSITIS: ICD-10-CM

## 2023-08-07 DIAGNOSIS — J30.1 SEASONAL ALLERGIC RHINITIS DUE TO POLLEN: ICD-10-CM

## 2023-08-07 DIAGNOSIS — J34.89 REFRACTORY OBSTRUCTION OF NASAL AIRWAY: Primary | ICD-10-CM

## 2023-08-07 PROCEDURE — 99214 OFFICE O/P EST MOD 30 MIN: CPT | Performed by: OTOLARYNGOLOGY

## 2023-08-07 PROCEDURE — 31237 NSL/SINS NDSC SURG BX POLYPC: CPT | Performed by: OTOLARYNGOLOGY

## 2023-08-07 PROCEDURE — 1123F ACP DISCUSS/DSCN MKR DOCD: CPT | Performed by: OTOLARYNGOLOGY

## 2023-08-07 NOTE — PROGRESS NOTES
[facility] [place] [facility]  [facility] EAR, NOSE AND THROAT  [address]  Dept: [phone]  Dept Fax: [phone]  Loc: Pb Calvillo is a 69 y.o. female who was referred by No ref. provider found for:  Chief Complaint   Patient presents with    Post-Op Check     Patient here for post op exam       HPI:     Brigido Mckeon is a 69 y.o. female who is status post bilateral davonte bullosa resection for chronic nasal obstruction and chronic rhinosinusitis. The patient returns for an interval exam with the possible need for nasal endoscopy with debridement. She reports having had much less debris drainage on her nasal irrigations which she is performing \"religiously\". She is having no facial pain and modest nasal discharge. She is now able to blow her nose so she is confident that her nasal discharge is markedly improved.      History:     No Known Allergies  Current Outpatient Medications   Medication Sig Dispense Refill    albuterol sulfate HFA (VENTOLIN HFA) 108 (90 Base) MCG/ACT inhaler Inhale 2 puffs into the lungs 4 times daily as needed for Wheezing 18 g 0    predniSONE (DELTASONE) 10 MG tablet Take 1 tablet by mouth daily for 14 days 40 mg for days 1-2, 20 mg for days 3-4, 10 mg for days 5-6 14 tablet 0    ketoconazole (NIZORAL) 2 % shampoo Apply 1 application topically once a week      Fexofenadine HCl (MUCINEX ALLERGY PO) Take 1 tablet by mouth daily      Cetirizine HCl (ZYRTEC ALLERGY) 10 MG CAPS Take 1 tablet by mouth daily      UNABLE TO FIND Take 1 tablet by mouth daily Herbal ENT      levothyroxine (SYNTHROID) 150 MCG tablet TAKE 1 TABLET DAILY WITH WATER ON AN EMPTY STOMACH - WAIT 30 MINUTES BEFORE EATING OR TAKING OTHER MEDICATIONS 90 tablet 3    NONFORMULARY Take 2 tablets by mouth daily AREDS      Lactobacillus (ACIDOPHILUS) 100 MG CAPS Take 1 capsule by mouth daily      triamcinolone (NASACORT) 55 MCG/ACT nasal

## 2023-08-13 ENCOUNTER — PATIENT MESSAGE (OUTPATIENT)
Dept: FAMILY MEDICINE CLINIC | Age: 78
End: 2023-08-13

## 2023-08-13 DIAGNOSIS — E03.9 HYPOTHYROIDISM, UNSPECIFIED TYPE: Primary | ICD-10-CM

## 2023-08-14 ENCOUNTER — PATIENT MESSAGE (OUTPATIENT)
Dept: ENT CLINIC | Age: 78
End: 2023-08-14

## 2023-08-14 ENCOUNTER — HOSPITAL ENCOUNTER (OUTPATIENT)
Dept: CT IMAGING | Age: 78
Discharge: HOME | End: 2023-08-14
Attending: OTOLARYNGOLOGY
Payer: MEDICARE

## 2023-08-14 DIAGNOSIS — J32.9 CHRONIC RHINOSINUSITIS: ICD-10-CM

## 2023-08-14 DIAGNOSIS — J30.1 SEASONAL ALLERGIC RHINITIS DUE TO POLLEN: ICD-10-CM

## 2023-08-14 DIAGNOSIS — J34.89 CONCHA BULLOSA: ICD-10-CM

## 2023-08-14 DIAGNOSIS — J31.0 CHRONIC RHINOSINUSITIS: ICD-10-CM

## 2023-08-14 DIAGNOSIS — J34.89 REFRACTORY OBSTRUCTION OF NASAL AIRWAY: ICD-10-CM

## 2023-08-14 PROCEDURE — 70486 CT MAXILLOFACIAL W/O DYE: CPT

## 2023-08-14 RX ORDER — SULFAMETHOXAZOLE AND TRIMETHOPRIM 800; 160 MG/1; MG/1
1 TABLET ORAL 2 TIMES DAILY
Qty: 20 TABLET | Refills: 0 | Status: SHIPPED | OUTPATIENT
Start: 2023-08-14 | End: 2023-08-24

## 2023-08-14 NOTE — TELEPHONE ENCOUNTER
From: [facility] Reyna  To: Dr. Ksenia Castelan: 8/14/2023 2:53 PM EDT  Subject: Sinus issues    Dr. Marcello Phan me for interfering in your busy week. My head aches have become pretty severe, along with chills and difficulty breathing. Tylenol and Musinex do not seem to be very helpful. THEN, I read the results of the CT scan. I am not scheduled to see you until Aug 21. Is there anything you can do to help me in the meantime?     Thank you,  [employer]  [phone]

## 2023-08-14 NOTE — TELEPHONE ENCOUNTER
From: Larry Orellana  To: Dr. Vikki Davalos  Sent: 8/13/2023 3:15 PM EDT  Subject: Roise Cord    Dr. Nancy Azevedo----    My surgery and follow ups with Dr. Trinity Briggs have been productive to control my sinus issues. I have more treatment with him but will no longer bug you constantly that I have an infection. I still have chills, brain fog, low energy, etc, etc, that I would like to get under control and would like a referral to Dr. Yajaira Hodge (fax [phone]). You and I have done all we can do for my thyroid, perhaps he can give me some help.     Thank you,  Janis Espinal

## 2023-08-15 NOTE — TELEPHONE ENCOUNTER
Looks like patient is already on the schedule Monday - this will put her on a week of antibiotics and rinses before debridement in office.

## 2023-08-15 NOTE — PROGRESS NOTES
The patient called complaining of severe head pain and face pain in the context of her recent CT scan that shows some scattered chronic sinusitis disease. I have sent in a prescription for Bactrim DS x10 days and nasal irrigation with saline lavage. Ana Bush.  Veda Baugh MD

## 2023-08-15 NOTE — TELEPHONE ENCOUNTER
Just got it. Sent in a prescription. See if you can help Francisco find a tuck in spot to see me or to see you or Jos Alvarez while I am in the office.   Thank you  PFC

## 2023-08-21 ENCOUNTER — OFFICE VISIT (OUTPATIENT)
Dept: ENT CLINIC | Age: 78
End: 2023-08-21

## 2023-08-21 VITALS
HEIGHT: 68 IN | HEART RATE: 96 BPM | BODY MASS INDEX: 39.39 KG/M2 | RESPIRATION RATE: 20 BRPM | WEIGHT: 259.9 LBS | SYSTOLIC BLOOD PRESSURE: 124 MMHG | DIASTOLIC BLOOD PRESSURE: 70 MMHG | TEMPERATURE: 98.1 F | OXYGEN SATURATION: 96 %

## 2023-08-21 DIAGNOSIS — J34.89 REFRACTORY OBSTRUCTION OF NASAL AIRWAY: Primary | ICD-10-CM

## 2023-08-21 DIAGNOSIS — Z98.890 STATUS POST FUNCTIONAL ENDOSCOPIC SINUS SURGERY: ICD-10-CM

## 2023-08-21 DIAGNOSIS — L90.5 SCAR OF NOSE: ICD-10-CM

## 2023-08-21 NOTE — PROGRESS NOTES
[facility] [place] [facility]  [facility] EAR, NOSE AND THROAT  [address]  Dept: [phone]  Dept Fax: [phone]  Loc: Tristin Griffiths is a 69 y.o. female who was referred by No ref. provider found for:  Chief Complaint   Patient presents with    Follow-up     Patient is here for a f/u and nasal endoscopy with debridement. Patient states she is doing better. She is still doing the rinses. Patient is still taking the Bactrim. HPI:     Jw Espino is a 69 y.o. 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 She has been using triamcinolone ointment and a Q-tip in order to drive this to tissue surfaces apart. She reports being successful and pleased with improved nasal breathing. She is breathing extremely well through her right side.      History:     No Known Allergies  Current Outpatient Medications   Medication Sig Dispense Refill    sulfamethoxazole-trimethoprim (BACTRIM DS;SEPTRA DS) 800-160 MG per tablet Take 1 tablet by mouth 2 times daily for 10 days 20 tablet 0    triamcinolone (KENALOG) 0.1 % ointment Apply with Q-tip to site twice daily as instructed 15 g 0    albuterol sulfate HFA (VENTOLIN HFA) 108 (90 Base) MCG/ACT inhaler Inhale 2 puffs into the lungs 4 times daily as needed for Wheezing 18 g 0    ketoconazole (NIZORAL) 2 % shampoo Apply 1 application topically once a week      Fexofenadine HCl (MUCINEX ALLERGY PO) Take 1 tablet by mouth daily      Cetirizine HCl (ZYRTEC ALLERGY) 10 MG CAPS Take 1 tablet by mouth daily      UNABLE TO FIND Take 1 tablet by mouth daily Herbal ENT      levothyroxine (SYNTHROID) 150 MCG tablet TAKE 1 TABLET DAILY WITH WATER ON AN EMPTY STOMACH - WAIT 30 MINUTES BEFORE EATING OR TAKING OTHER MEDICATIONS 90 tablet 3    NONFORMULARY Take 2

## 2023-10-05 ENCOUNTER — HOSPITAL ENCOUNTER (OUTPATIENT)
Dept: CT IMAGING | Age: 78
Discharge: HOME | End: 2023-10-05
Attending: OTOLARYNGOLOGY
Payer: MEDICARE

## 2023-10-05 DIAGNOSIS — Z98.890 STATUS POST FUNCTIONAL ENDOSCOPIC SINUS SURGERY: ICD-10-CM

## 2023-10-05 DIAGNOSIS — J32.9 CHRONIC RHINOSINUSITIS: ICD-10-CM

## 2023-10-05 PROCEDURE — 70486 CT MAXILLOFACIAL W/O DYE: CPT

## 2023-10-09 ENCOUNTER — OFFICE VISIT (OUTPATIENT)
Dept: ENT CLINIC | Age: 78
End: 2023-10-09

## 2023-10-09 VITALS
HEART RATE: 85 BPM | BODY MASS INDEX: 40.45 KG/M2 | DIASTOLIC BLOOD PRESSURE: 82 MMHG | SYSTOLIC BLOOD PRESSURE: 128 MMHG | OXYGEN SATURATION: 95 % | HEIGHT: 68 IN | RESPIRATION RATE: 12 BRPM | TEMPERATURE: 97.6 F | WEIGHT: 266.9 LBS

## 2023-10-09 DIAGNOSIS — Z98.890 STATUS POST FUNCTIONAL ENDOSCOPIC SINUS SURGERY: Primary | ICD-10-CM

## 2023-10-09 DIAGNOSIS — J32.9 CHRONIC RHINOSINUSITIS: ICD-10-CM

## 2023-10-09 DIAGNOSIS — J32.0 CHRONIC MAXILLARY SINUSITIS: ICD-10-CM

## 2023-10-09 DIAGNOSIS — J32.2 CHRONIC ETHMOIDAL SINUSITIS: ICD-10-CM

## 2023-10-09 DIAGNOSIS — J32.3 CHRONIC SPHENOIDAL SINUSITIS: ICD-10-CM

## 2023-10-09 NOTE — PROGRESS NOTES
[facility] [place] [facility]  [facility] EAR, NOSE AND THROAT  [address]  Dept: [phone]  Dept Fax: [phone]  Loc: Amanda Gandhi is a 69 y.o. female who was referred by No ref. provider found for:  Chief Complaint   Patient presents with    Follow-up     Patient here for 1 month follow up and nasal endoscopy with debridement. HPI:     Roosevelt Shearer is a 69 y.o. female status post bilateral davonte bullosa resections with minimal ethmoid surgery. The patient reports having frontal and orbital headaches (between the eyes) as well as chills since her last visit 4 weeks ago. She reports the lymph nodes in her axilla are enlarged that she is having no symptoms referable to that in her arms or chest.  She reports breathing comfortably through her nose and is irrigating at least twice a day with normal saline. Concerned that she is having fevers but has not objectively found this to be the case. She is having chills however.      History:     No Known Allergies  Current Outpatient Medications   Medication Sig Dispense Refill    triamcinolone (KENALOG) 0.1 % ointment Apply with Q-tip to site twice daily as instructed 15 g 0    ketoconazole (NIZORAL) 2 % shampoo Apply 1 application topically once a week      UNABLE TO FIND Take 1 tablet by mouth daily Herbal ENT      levothyroxine (SYNTHROID) 150 MCG tablet TAKE 1 TABLET DAILY WITH WATER ON AN EMPTY STOMACH - WAIT 30 MINUTES BEFORE EATING OR TAKING OTHER MEDICATIONS 90 tablet 3    NONFORMULARY Take 2 tablets by mouth daily AREDS      Lactobacillus (ACIDOPHILUS) 100 MG CAPS Take 1 capsule by mouth daily      triamcinolone (NASACORT) 55 MCG/ACT nasal inhaler 2 sprays by Nasal route daily as needed      vitamin B-12 (CYANOCOBALAMIN) 500 MCG tablet Take 1 tablet by mouth daily Indications: 2500mg a day      Niacin (VITAMIN B-3 PO) Take 1 tablet by mouth daily      Magnesium 500 MG

## 2023-10-11 ENCOUNTER — TELEPHONE (OUTPATIENT)
Dept: ENT CLINIC | Age: 78
End: 2023-10-11

## 2023-10-11 NOTE — TELEPHONE ENCOUNTER
Patient is calling regarding a script for antibiotic. Her pharmacy does not have anything yet.   Pharmacy is [facility]

## 2023-10-13 ENCOUNTER — PATIENT MESSAGE (OUTPATIENT)
Dept: ENT CLINIC | Age: 78
End: 2023-10-13

## 2023-10-13 RX ORDER — SULFAMETHOXAZOLE AND TRIMETHOPRIM 800; 160 MG/1; MG/1
1 TABLET ORAL 2 TIMES DAILY
Qty: 42 TABLET | Refills: 0 | Status: SHIPPED | OUTPATIENT
Start: 2023-10-13 | End: 2023-11-03

## 2023-10-13 NOTE — TELEPHONE ENCOUNTER
From: Cruz Orellana  To: Dr. Leonardo Sadler: 10/13/2023 11:55 AM EDT  Subject: Prescription needed      On monday, Oct 9, 2023, we talked about treatment that involved me taking antibiotic for 2 months. [facility] at [facility] has not received the prescription at this time (no). I have left 2 messages at the office. Headaches are very heavy----rinsing and Nasacort are helping very little.     Thank you for being a very caring physician,  Naomi MARTIN 1945  Phone: [phone]

## 2023-10-13 NOTE — TELEPHONE ENCOUNTER
Reviewed with Dr Katey Otero - he would like Bactrim DS x21 days, then back to see him in office. He discussed irrigations with her at visit. Please make sure she stays well hydrated (increase her fluid intake) while taking Bactrim. Rx was sent.

## 2023-10-13 NOTE — TELEPHONE ENCOUNTER
Patient called in again asking about the antibiotic she said that Dr. Rima Washington was going to send a prescription in to pharmacy for her and they still have not received it.

## 2023-10-15 SDOH — HEALTH STABILITY: PHYSICAL HEALTH: ON AVERAGE, HOW MANY DAYS PER WEEK DO YOU ENGAGE IN MODERATE TO STRENUOUS EXERCISE (LIKE A BRISK WALK)?: 3 DAYS

## 2023-10-15 SDOH — HEALTH STABILITY: PHYSICAL HEALTH: ON AVERAGE, HOW MANY MINUTES DO YOU ENGAGE IN EXERCISE AT THIS LEVEL?: 120 MIN

## 2023-10-15 ASSESSMENT — PATIENT HEALTH QUESTIONNAIRE - PHQ9
1. LITTLE INTEREST OR PLEASURE IN DOING THINGS: 0
SUM OF ALL RESPONSES TO PHQ QUESTIONS 1-9: 0
SUM OF ALL RESPONSES TO PHQ9 QUESTIONS 1 & 2: 0
2. FEELING DOWN, DEPRESSED OR HOPELESS: 0
SUM OF ALL RESPONSES TO PHQ QUESTIONS 1-9: 0

## 2023-10-15 ASSESSMENT — LIFESTYLE VARIABLES
HOW MANY STANDARD DRINKS CONTAINING ALCOHOL DO YOU HAVE ON A TYPICAL DAY: 0
HOW OFTEN DO YOU HAVE SIX OR MORE DRINKS ON ONE OCCASION: 1
HOW MANY STANDARD DRINKS CONTAINING ALCOHOL DO YOU HAVE ON A TYPICAL DAY: PATIENT DOES NOT DRINK

## 2023-10-16 ENCOUNTER — OFFICE VISIT (OUTPATIENT)
Dept: FAMILY MEDICINE CLINIC | Age: 78
End: 2023-10-16
Payer: MEDICARE

## 2023-10-16 VITALS
RESPIRATION RATE: 16 BRPM | HEART RATE: 76 BPM | BODY MASS INDEX: 39.78 KG/M2 | SYSTOLIC BLOOD PRESSURE: 126 MMHG | HEIGHT: 68 IN | WEIGHT: 262.5 LBS | DIASTOLIC BLOOD PRESSURE: 70 MMHG

## 2023-10-16 DIAGNOSIS — M51.36 DDD (DEGENERATIVE DISC DISEASE), LUMBAR: ICD-10-CM

## 2023-10-16 DIAGNOSIS — Z00.00 MEDICARE ANNUAL WELLNESS VISIT, SUBSEQUENT: Primary | ICD-10-CM

## 2023-10-16 DIAGNOSIS — R60.0 LOWER LEG EDEMA: ICD-10-CM

## 2023-10-16 DIAGNOSIS — R73.01 IFG (IMPAIRED FASTING GLUCOSE): ICD-10-CM

## 2023-10-16 DIAGNOSIS — Z98.890 STATUS POST FUNCTIONAL ENDOSCOPIC SINUS SURGERY: ICD-10-CM

## 2023-10-16 DIAGNOSIS — E03.9 HYPOTHYROIDISM, UNSPECIFIED TYPE: ICD-10-CM

## 2023-10-16 DIAGNOSIS — E66.01 MORBID OBESITY WITH BMI OF 40.0-44.9, ADULT (HCC): ICD-10-CM

## 2023-10-16 DIAGNOSIS — G47.33 OSA ON CPAP: ICD-10-CM

## 2023-10-16 DIAGNOSIS — J32.9 CHRONIC SINUSITIS, UNSPECIFIED LOCATION: ICD-10-CM

## 2023-10-16 PROBLEM — G47.00 INSOMNIA: Status: RESOLVED | Noted: 2018-05-16 | Resolved: 2023-10-16

## 2023-10-16 PROCEDURE — 1123F ACP DISCUSS/DSCN MKR DOCD: CPT | Performed by: FAMILY MEDICINE

## 2023-10-16 PROCEDURE — G0439 PPPS, SUBSEQ VISIT: HCPCS | Performed by: FAMILY MEDICINE

## 2023-10-16 RX ORDER — PSEUDOEPHEDRINE HCL 30 MG
30 TABLET ORAL 3 TIMES DAILY
COMMUNITY

## 2023-10-16 ASSESSMENT — ENCOUNTER SYMPTOMS
GASTROINTESTINAL NEGATIVE: 1
RESPIRATORY NEGATIVE: 1

## 2023-10-16 NOTE — PATIENT INSTRUCTIONS
You may receive a survey about your visit with us today. The feedback from our patients helps us identify what is working well and where the service to all patients can be enhanced. Thank you! Starting a Weight Loss Plan: Care Instructions  Overview     If you're thinking about losing weight, it can be hard to know where to start. Your doctor can help you set up a weight loss plan that best meets your needs. You may want to take a class on nutrition or exercise, or you could join a weight loss support group. If you have questions about how to make changes to your eating or exercise habits, ask your doctor about seeing a registered dietitian or an exercise specialist.  It can be a big challenge to lose weight. But you don't have to make huge changes at once. Make small changes, and stick with them. When those changes become habit, add a few more changes. If you don't think you're ready to make changes right now, try to pick a date in the future. Make an appointment to see your doctor to discuss whether the time is right for you to start a plan. Follow-up care is a key part of your treatment and safety. Be sure to make and go to all appointments, and call your doctor if you are having problems. It's also a good idea to know your test results and keep a list of the medicines you take. How can you care for yourself at home? Set realistic goals. Many people expect to lose much more weight than is likely. A weight loss of 5% to 10% of your body weight may be enough to improve your health. Get family and friends involved to provide support. Talk to them about why you are trying to lose weight, and ask them to help. They can help by participating in exercise and having meals with you, even if they may be eating something different. Find what works best for you. If you do not have time or do not like to cook, a program that offers meal replacement bars or shakes may be better for you.  Or if you like to prepare

## 2023-10-24 NOTE — TELEPHONE ENCOUNTER
Patient returned my call. Asked patient if she had gotten the antibiotic. Patient stated she did get it.

## 2023-10-24 NOTE — TELEPHONE ENCOUNTER
Spoke to Dr De Leon Anger in regards to patient and the antibiotic. He states he did send in the Rx for Bactrim DS. Attempted to call patient to verify. Got her voicemail. Left message.

## 2023-10-26 ENCOUNTER — TELEPHONE (OUTPATIENT)
Dept: ENT CLINIC | Age: 78
End: 2023-10-26

## 2023-10-26 NOTE — TELEPHONE ENCOUNTER
I left a voicemail for the patient to call me back when available to discuss holding surgery time after her next appointment.

## 2023-10-31 ENCOUNTER — OFFICE VISIT (OUTPATIENT)
Dept: FAMILY MEDICINE CLINIC | Age: 78
End: 2023-10-31
Payer: MEDICARE

## 2023-10-31 VITALS
BODY MASS INDEX: 41.62 KG/M2 | DIASTOLIC BLOOD PRESSURE: 84 MMHG | HEIGHT: 67 IN | HEART RATE: 86 BPM | WEIGHT: 265.2 LBS | TEMPERATURE: 98 F | SYSTOLIC BLOOD PRESSURE: 138 MMHG | OXYGEN SATURATION: 95 %

## 2023-10-31 DIAGNOSIS — B37.81 CANDIDA INFECTION, ESOPHAGEAL (HCC): ICD-10-CM

## 2023-10-31 DIAGNOSIS — R59.1 LYMPHADENOPATHY: ICD-10-CM

## 2023-10-31 DIAGNOSIS — E55.9 HYPOVITAMINOSIS D: ICD-10-CM

## 2023-10-31 DIAGNOSIS — R73.01 IFG (IMPAIRED FASTING GLUCOSE): ICD-10-CM

## 2023-10-31 DIAGNOSIS — R79.89 ABNORMAL CBC MEASUREMENT: ICD-10-CM

## 2023-10-31 DIAGNOSIS — E03.9 HYPOTHYROIDISM, UNSPECIFIED TYPE: Primary | ICD-10-CM

## 2023-10-31 DIAGNOSIS — E78.5 HYPERLIPIDEMIA WITH TARGET LDL LESS THAN 100: ICD-10-CM

## 2023-10-31 PROCEDURE — 99214 OFFICE O/P EST MOD 30 MIN: CPT | Performed by: NURSE PRACTITIONER

## 2023-10-31 PROCEDURE — 1123F ACP DISCUSS/DSCN MKR DOCD: CPT | Performed by: NURSE PRACTITIONER

## 2023-10-31 RX ORDER — CLOTRIMAZOLE 10 MG/1
10 LOZENGE ORAL; TOPICAL
Qty: 50 TABLET | Refills: 0 | Status: SHIPPED | OUTPATIENT
Start: 2023-10-31 | End: 2023-11-10

## 2023-10-31 RX ORDER — FLUCONAZOLE 100 MG/1
100 TABLET ORAL DAILY
Qty: 10 TABLET | Refills: 0 | Status: SHIPPED | OUTPATIENT
Start: 2023-10-31 | End: 2023-11-10

## 2023-10-31 SDOH — HEALTH STABILITY: PHYSICAL HEALTH: ON AVERAGE, HOW MANY DAYS PER WEEK DO YOU ENGAGE IN MODERATE TO STRENUOUS EXERCISE (LIKE A BRISK WALK)?: 3 DAYS

## 2023-10-31 SDOH — HEALTH STABILITY: PHYSICAL HEALTH: ON AVERAGE, HOW MANY MINUTES DO YOU ENGAGE IN EXERCISE AT THIS LEVEL?: 120 MIN

## 2023-10-31 ASSESSMENT — SOCIAL DETERMINANTS OF HEALTH (SDOH)
WITHIN THE LAST YEAR, HAVE YOU BEEN KICKED, HIT, SLAPPED, OR OTHERWISE PHYSICALLY HURT BY YOUR PARTNER OR EX-PARTNER?: NO
WITHIN THE LAST YEAR, HAVE YOU BEEN AFRAID OF YOUR PARTNER OR EX-PARTNER?: NO
WITHIN THE LAST YEAR, HAVE YOU BEEN HUMILIATED OR EMOTIONALLY ABUSED IN OTHER WAYS BY YOUR PARTNER OR EX-PARTNER?: NO
WITHIN THE LAST YEAR, HAVE TO BEEN RAPED OR FORCED TO HAVE ANY KIND OF SEXUAL ACTIVITY BY YOUR PARTNER OR EX-PARTNER?: NO

## 2023-10-31 NOTE — PATIENT INSTRUCTIONS
[facility] Massage clinic for Lymphatic drainage and for Gut Health   Stay away from simple starches, sugars, flours.

## 2023-10-31 NOTE — PROGRESS NOTES
[facility]  Dept: [phone]          Nandini Orellana (:  1945) is a 69 y.o. female,Established patient, here for evaluation of the following chief complaint(s):  New Patient and Establish Care      ASSESSMENT/PLAN:  I have reviewed the patient's medical history in detail and updated the computerized patient record. HPI/ROS per the patient and caregiver. Overall non toxic in appearance. Answers questions appropriately. Conditions discussed and addressed this visit include:   Pt here to establish care  Overall doing very well  Active, exercise on regular basis  Some ongoing lymphadenopathy/congestion following long recovery from sinus surgery. Will check thyroid  Consider seeing Dr Hannah Chester for WEE protocol  Massage therapy for lymphatic drainage  Start treatment for yeast  Diet change to include limited simple sugars, starches, white flours, breads, etc  Include more complex carbs  Mediterranean diet  Probiotics, live source. The patient is advised to begin progressive daily aerobic exercise program, follow a low fat, low cholesterol diet, attempt to lose weight, reduce salt in diet and cooking, reduce exposure to stress, improve dietary compliance, use calcium 1 gram daily with Vit D, continue current medications, and continue current healthy lifestyle patterns. 1. Hypothyroidism, unspecified type  -     fluconazole (DIFLUCAN) 100 MG tablet; Take 1 tablet by mouth daily for 10 days, Disp-10 tablet, R-0Normal  -     clotrimazole (MYCELEX) 10 MG marina; Take 1 tablet by mouth 5 times daily for 10 days, Disp-50 tablet, R-0Normal  -     Lipid Panel; Future  -     Comprehensive Metabolic Panel; Future  -     CBC with Auto Differential; Future  -     TSH with Reflex; Future  -     Vitamin D 25 Hydroxy; Future  -     Iron; Future  -     Iron Saturation; Future  -     Magnesium; Future  -     Phosphorus; Future  -     PTH, Intact;  Future  -     Vitamin B12 &

## 2023-11-01 PROBLEM — E55.9 HYPOVITAMINOSIS D: Status: ACTIVE | Noted: 2023-11-01

## 2023-11-01 PROBLEM — R73.01 IFG (IMPAIRED FASTING GLUCOSE): Status: ACTIVE | Noted: 2023-11-01

## 2023-11-01 PROBLEM — R79.89 ABNORMAL CBC MEASUREMENT: Status: ACTIVE | Noted: 2023-11-01

## 2023-11-01 PROBLEM — B37.81 CANDIDA INFECTION, ESOPHAGEAL (HCC): Status: ACTIVE | Noted: 2023-11-01

## 2023-11-01 PROBLEM — R59.1 LYMPHADENOPATHY: Status: ACTIVE | Noted: 2023-11-01

## 2023-11-01 ASSESSMENT — ENCOUNTER SYMPTOMS
EYES NEGATIVE: 1
SHORTNESS OF BREATH: 0
COUGH: 0
CHEST TIGHTNESS: 0
TROUBLE SWALLOWING: 0
SINUS PRESSURE: 1
VOICE CHANGE: 0
SINUS PAIN: 1
GASTROINTESTINAL NEGATIVE: 1
SORE THROAT: 0
WHEEZING: 0

## 2023-11-03 ENCOUNTER — NURSE ONLY (OUTPATIENT)
Dept: LAB | Age: 78
End: 2023-11-03

## 2023-11-03 DIAGNOSIS — E03.9 HYPOTHYROIDISM, UNSPECIFIED TYPE: ICD-10-CM

## 2023-11-03 DIAGNOSIS — R73.01 IFG (IMPAIRED FASTING GLUCOSE): ICD-10-CM

## 2023-11-03 DIAGNOSIS — R59.1 LYMPHADENOPATHY: ICD-10-CM

## 2023-11-03 DIAGNOSIS — B37.81 CANDIDA INFECTION, ESOPHAGEAL (HCC): ICD-10-CM

## 2023-11-03 DIAGNOSIS — E55.9 HYPOVITAMINOSIS D: ICD-10-CM

## 2023-11-03 DIAGNOSIS — R79.89 ABNORMAL CBC MEASUREMENT: ICD-10-CM

## 2023-11-03 DIAGNOSIS — E78.5 HYPERLIPIDEMIA WITH TARGET LDL LESS THAN 100: ICD-10-CM

## 2023-11-03 LAB
25(OH)D3 SERPL-MCNC: 41 NG/ML (ref 30–100)
ALBUMIN SERPL BCG-MCNC: 3.8 G/DL (ref 3.5–5.1)
ALP SERPL-CCNC: 53 U/L (ref 38–126)
ALT SERPL W/O P-5'-P-CCNC: 15 U/L (ref 11–66)
ANION GAP SERPL CALC-SCNC: 8 MEQ/L (ref 8–16)
AST SERPL-CCNC: 22 U/L (ref 5–40)
BASOPHILS ABSOLUTE: 0.1 THOU/MM3 (ref 0–0.1)
BASOPHILS NFR BLD AUTO: 0.8 %
BILIRUB SERPL-MCNC: 0.3 MG/DL (ref 0.3–1.2)
BUN SERPL-MCNC: 12 MG/DL (ref 7–22)
CALCIUM SERPL-MCNC: 9.3 MG/DL (ref 8.5–10.5)
CHLORIDE SERPL-SCNC: 104 MEQ/L (ref 98–111)
CHOLEST SERPL-MCNC: 189 MG/DL (ref 100–199)
CO2 SERPL-SCNC: 26 MEQ/L (ref 23–33)
CREAT SERPL-MCNC: 0.6 MG/DL (ref 0.4–1.2)
DEPRECATED RDW RBC AUTO: 46.2 FL (ref 35–45)
EOSINOPHIL NFR BLD AUTO: 1.9 %
EOSINOPHILS ABSOLUTE: 0.1 THOU/MM3 (ref 0–0.4)
ERYTHROCYTE [DISTWIDTH] IN BLOOD BY AUTOMATED COUNT: 12.9 % (ref 11.5–14.5)
FOLATE SERPL-MCNC: 14.5 NG/ML (ref 4.8–24.2)
GFR SERPL CREATININE-BSD FRML MDRD: > 60 ML/MIN/1.73M2
GLUCOSE SERPL-MCNC: 100 MG/DL (ref 70–108)
HCT VFR BLD AUTO: 43.6 % (ref 37–47)
HDLC SERPL-MCNC: 47 MG/DL
HGB BLD-MCNC: 14.1 GM/DL (ref 12–16)
IMM GRANULOCYTES # BLD AUTO: 0.04 THOU/MM3 (ref 0–0.07)
IMM GRANULOCYTES NFR BLD AUTO: 0.5 %
IRON SATN MFR SERPL: 33 % (ref 20–50)
IRON SERPL-MCNC: 101 UG/DL (ref 50–170)
LDLC SERPL CALC-MCNC: 120 MG/DL
LYMPHOCYTES ABSOLUTE: 2 THOU/MM3 (ref 1–4.8)
LYMPHOCYTES NFR BLD AUTO: 27.7 %
MAGNESIUM SERPL-MCNC: 2 MG/DL (ref 1.6–2.4)
MCH RBC QN AUTO: 31.5 PG (ref 26–33)
MCHC RBC AUTO-ENTMCNC: 32.3 GM/DL (ref 32.2–35.5)
MCV RBC AUTO: 97.3 FL (ref 81–99)
MONOCYTES ABSOLUTE: 0.6 THOU/MM3 (ref 0.4–1.3)
MONOCYTES NFR BLD AUTO: 8.2 %
NEUTROPHILS NFR BLD AUTO: 60.9 %
NRBC BLD AUTO-RTO: 0 /100 WBC
PHOSPHATE SERPL-MCNC: 3.7 MG/DL (ref 2.4–4.7)
PLATELET # BLD AUTO: 230 THOU/MM3 (ref 130–400)
PMV BLD AUTO: 10.5 FL (ref 9.4–12.4)
POTASSIUM SERPL-SCNC: 4.2 MEQ/L (ref 3.5–5.2)
PROT SERPL-MCNC: 6.9 G/DL (ref 6.1–8)
RBC # BLD AUTO: 4.48 MILL/MM3 (ref 4.2–5.4)
SEGMENTED NEUTROPHILS ABSOLUTE COUNT: 4.4 THOU/MM3 (ref 1.8–7.7)
SODIUM SERPL-SCNC: 138 MEQ/L (ref 135–145)
T4 FREE SERPL-MCNC: 1.09 NG/DL (ref 0.93–1.76)
TIBC SERPL-MCNC: 307 UG/DL (ref 171–450)
TRIGL SERPL-MCNC: 109 MG/DL (ref 0–199)
TSH SERPL DL<=0.005 MIU/L-ACNC: 0.31 UIU/ML (ref 0.4–4.2)
VIT B12 SERPL-MCNC: 588 PG/ML (ref 211–911)
WBC # BLD AUTO: 7.3 THOU/MM3 (ref 4.8–10.8)

## 2023-11-04 LAB — PTH-INTACT SERPL-MCNC: 30.9 PG/ML (ref 15–65)

## 2023-11-06 ENCOUNTER — TELEPHONE (OUTPATIENT)
Dept: FAMILY MEDICINE CLINIC | Age: 78
End: 2023-11-06

## 2023-11-06 NOTE — TELEPHONE ENCOUNTER
----- Message from ADE Thomson CNP sent at 11/6/2023  7:47 AM EST -----  Labs are all stable at this time. No change to current medications. Continue supplements as she has been. Keep appointment for end of the month.

## 2023-11-06 NOTE — TELEPHONE ENCOUNTER
I copy and pasted Bertha's response regarding the patient's lab results and sent them to the patient via My Chart.

## 2023-11-17 ENCOUNTER — TELEPHONE (OUTPATIENT)
Dept: CARDIOLOGY CLINIC | Age: 78
End: 2023-11-17

## 2023-11-17 NOTE — TELEPHONE ENCOUNTER
Shania [facility] is scheduled for surgery with Dr Joseluis Mederos on 1/4/24. We are requesting cardiac clearance. Surgery:   Image Guided total Ethmoidectomy, sphenoidotomy and Maxillary antrostomy    Please advise. Thank you!

## 2023-11-28 ENCOUNTER — OFFICE VISIT (OUTPATIENT)
Dept: FAMILY MEDICINE CLINIC | Age: 78
End: 2023-11-28
Payer: MEDICARE

## 2023-11-28 VITALS
DIASTOLIC BLOOD PRESSURE: 82 MMHG | WEIGHT: 262 LBS | BODY MASS INDEX: 40.67 KG/M2 | HEART RATE: 76 BPM | OXYGEN SATURATION: 98 % | SYSTOLIC BLOOD PRESSURE: 136 MMHG | TEMPERATURE: 97.8 F

## 2023-11-28 DIAGNOSIS — B37.81 CANDIDA INFECTION, ESOPHAGEAL (HCC): ICD-10-CM

## 2023-11-28 DIAGNOSIS — E78.5 HYPERLIPIDEMIA WITH TARGET LDL LESS THAN 100: ICD-10-CM

## 2023-11-28 DIAGNOSIS — J32.9 CHRONIC SINUSITIS, UNSPECIFIED LOCATION: ICD-10-CM

## 2023-11-28 DIAGNOSIS — R59.1 LYMPHADENOPATHY: ICD-10-CM

## 2023-11-28 DIAGNOSIS — E03.9 HYPOTHYROIDISM, UNSPECIFIED TYPE: Primary | ICD-10-CM

## 2023-11-28 PROCEDURE — 1123F ACP DISCUSS/DSCN MKR DOCD: CPT | Performed by: NURSE PRACTITIONER

## 2023-11-28 PROCEDURE — 99214 OFFICE O/P EST MOD 30 MIN: CPT | Performed by: NURSE PRACTITIONER

## 2023-11-28 ASSESSMENT — ENCOUNTER SYMPTOMS
CHEST TIGHTNESS: 0
SINUS PRESSURE: 1
GASTROINTESTINAL NEGATIVE: 1
COUGH: 0
SHORTNESS OF BREATH: 0
SINUS PAIN: 1
VOICE CHANGE: 0
SORE THROAT: 0
WHEEZING: 0
EYES NEGATIVE: 1
TROUBLE SWALLOWING: 0

## 2023-11-28 NOTE — PROGRESS NOTES
[facility]  Dept: [phone]          Nash Orellana (:  1945) is a 69 y.o. female,Established patient, here for evaluation of the following chief complaint(s):  Patient brought a list with her to show Mau Vaz. ASSESSMENT/PLAN:  I have reviewed the patient's medical history in detail and updated the computerized patient record. HPI/ROS per the patient and caregiver. Overall non toxic in appearance. Answers questions appropriately. Conditions discussed and addressed this visit include:   Here for follow up  Reviewed all labs with the patient  Overall improved  Continue to work with probiotic  Will see after sinus surgery  Continue to work with diet and activity  The patient is advised to begin progressive daily aerobic exercise program, follow a low fat, low cholesterol diet, attempt to lose weight, reduce salt in diet and cooking, reduce exposure to stress, improve dietary compliance, use calcium 1 gram daily with Vit D, continue current medications, and continue current healthy lifestyle patterns. 1. Hypothyroidism, unspecified type; stable  2. Lymphadenopathy; improving  3. Hyperlipidemia with target LDL less than 100; not at goal  4. Candida infection, esophageal ([address]); improving  5. Chronic sinusitis, unspecified location; not at goal as of yet. Return in about 6 months (around 2024) for Results review, Routine follow up. SUBJECTIVE/OBJECTIVE:  HPI  Here for follow up  Overall doing well  Has been for lymphatic drainage  Low sugar diet  Working out at [employer]  Daily sinus issues  Bowel and bladder improving  Sinus surgery in January    Review of Systems   Constitutional:  Negative for activity change, appetite change, fatigue, fever and unexpected weight change. HENT:  Positive for sinus pressure and sinus pain. Negative for sore throat, trouble swallowing and voice change. Eyes: Negative.     Respiratory:  Negative for cough, chest

## 2023-11-30 ENCOUNTER — TELEPHONE (OUTPATIENT)
Dept: ENT CLINIC | Age: 78
End: 2023-11-30

## 2023-11-30 NOTE — TELEPHONE ENCOUNTER
Patient called in the office and left a message that she was needing to have a CT Scan prior to her appointment on 12/13/2023. She stated that she was calling, because no one has called her to schedule. I do  not see an order.     If she needs to have a CT Scan, please place an order and we will try to get it scheduled before her appointment (it have to be STAT)    Please advise

## 2023-12-01 NOTE — TELEPHONE ENCOUNTER
Order for paranasal sinus CT placed. Please call the patient and let her know. Pharynx up-to-date the week prior for the skin to be performed.   Thank you  PFC

## 2023-12-06 ENCOUNTER — HOSPITAL ENCOUNTER (OUTPATIENT)
Age: 78
End: 2023-12-06
Payer: MEDICARE

## 2023-12-06 ENCOUNTER — HOSPITAL ENCOUNTER (OUTPATIENT)
Dept: CT IMAGING | Age: 78
Discharge: HOME | End: 2023-12-06
Attending: OTOLARYNGOLOGY
Payer: MEDICARE

## 2023-12-06 DIAGNOSIS — Z98.890 STATUS POST FUNCTIONAL ENDOSCOPIC SINUS SURGERY: ICD-10-CM

## 2023-12-06 DIAGNOSIS — J32.9 CHRONIC RHINOSINUSITIS: ICD-10-CM

## 2023-12-06 PROCEDURE — 70486 CT MAXILLOFACIAL W/O DYE: CPT

## 2023-12-13 ENCOUNTER — OFFICE VISIT (OUTPATIENT)
Dept: ENT CLINIC | Age: 78
End: 2023-12-13
Payer: MEDICARE

## 2023-12-13 VITALS
RESPIRATION RATE: 18 BRPM | WEIGHT: 258.1 LBS | HEART RATE: 72 BPM | OXYGEN SATURATION: 99 % | BODY MASS INDEX: 40.51 KG/M2 | SYSTOLIC BLOOD PRESSURE: 122 MMHG | HEIGHT: 67 IN | TEMPERATURE: 97.3 F | DIASTOLIC BLOOD PRESSURE: 76 MMHG

## 2023-12-13 DIAGNOSIS — J32.2 CHRONIC ETHMOIDAL SINUSITIS: ICD-10-CM

## 2023-12-13 DIAGNOSIS — Z98.890 STATUS POST FUNCTIONAL ENDOSCOPIC SINUS SURGERY: Primary | ICD-10-CM

## 2023-12-13 PROCEDURE — 99214 OFFICE O/P EST MOD 30 MIN: CPT | Performed by: OTOLARYNGOLOGY

## 2023-12-13 PROCEDURE — 1123F ACP DISCUSS/DSCN MKR DOCD: CPT | Performed by: OTOLARYNGOLOGY

## 2023-12-13 NOTE — PROGRESS NOTES
[facility] [place] [facility]  [facility] EAR, NOSE AND THROAT  [address]  Dept: [phone]  Dept Fax: [phone]  Loc: Elayne Shannon is a 69 y.o. female who was referred by No ref. provider found for:  Chief Complaint   Patient presents with    Follow-up     2 month follow up. Holding surgery date. She would like to proceed with surgery if at all possible. She went off all white sugar and white flour November 2 per recommendation of PCP and is feeling better. HPI:     Maximo Vang is a 69 y.o. 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 The patient has been treated multiple times with antibiotics and is now on decongestants, hypertonic saline, and has finished a course of antibiotics. She states that she is feeling significantly better now but in particular because of a olive leaf extract that she takes twice a day \"5 leafs each dose\" she can suppress which she found to be both fever and shaking chills. She has missed a couple of doses and these last couple of weeks and had a prompt return of a low-grade fever and shaking chills during those intervals. History:     No Known Allergies  Current Outpatient Medications   Medication Sig Dispense Refill    Multiple Vitamin (MULTIVITAMIN PO) Take 1 each by mouth daily Ultralife 121 one tablet daily.       Multiple Vitamins-Minerals (PRESERVISION AREDS PO) Take by mouth daily      OLIVE LEAF PO Take by mouth as needed      pseudoephedrine (SUDAFED) 30 MG tablet Take 1 tablet by mouth 3 times daily      levothyroxine (SYNTHROID) 150 MCG tablet TAKE 1 TABLET DAILY WITH WATER ON AN EMPTY STOMACH - WAIT 30 MINUTES BEFORE EATING OR TAKING OTHER MEDICATIONS 90 tablet 3    Lactobacillus (ACIDOPHILUS) 100 MG CAPS Take 1 capsule by mouth daily      triamcinolone (NASACORT) 55 MCG/ACT

## 2023-12-19 ENCOUNTER — PREP FOR PROCEDURE (OUTPATIENT)
Dept: ENT CLINIC | Age: 78
End: 2023-12-19

## 2023-12-21 NOTE — PROGRESS NOTES
[facility]      Darcie Cuevas         [id number]  12/26/2023   Chief Complaint   Patient presents with    Follow-up     1yr ARTIS f/u w/[facility] download. Doing well. Needs script for PAP supplies. Pt of Dr. Chin Bearden     PAP Download:   Original or initial AHI: 27.9     Date of initial study: 3/22/2016       Compliant  97%     Noncompliant 3 %     PAP Type CPAP   Level  10 cmH2O   Avg Hrs/Day 7hrs 58mins  AHI: 1.5   Recorded compliance dates , 11/13/23  to 12/12/23   Machine/Mfg:   [x] ResMed    [] Respironics/Dreamstation   Interface:   [x] Nasal    [] Nasal pillows   [] FFM      Provider:      [] SUMAYA     []Kallie     [] Aylin    [x] [place]   [] Holley               [] [facility]      [] Adaptive    [] [address] [facility] [address]:      [] Other    Neck Size: 16.25 inches  Mallampati 4  ESS:  2  SAQLI: 90    Here is a scan of the most recent download:            Presentation:   Katheryn Boggs presents for sleep medicine follow up for obstructive sleep apnea  Since the last visit, Katheryn Boggs is doing well with PAP. She is sleeping well and feels rested. She has been having sinus issues and planning surgery again in week. Equipment issues: The pressure is  acceptable, the mask is acceptable     Sleep issues:  Do you feel better? Yes  More rested? Yes   Better concentration? yes    Progress History:   Since last visit any new medical issues? Yes sinus surgery  New ER or hospital visits? No  Any new or changes in medicines? No  Any new sleep medicines? No    Review of Systems -   Review of Systems   Constitutional:  Negative for activity change, appetite change, chills and fever. HENT:  Negative for congestion and postnasal drip. Eyes: Negative. Respiratory:  Negative for cough, chest tightness, shortness of breath, wheezing and stridor. Cardiovascular:  Negative for chest pain and leg swelling. Gastrointestinal:  Negative for diarrhea and nausea.    Endocrine:

## 2023-12-22 ENCOUNTER — TELEPHONE (OUTPATIENT)
Dept: ENT CLINIC | Age: 78
End: 2023-12-22

## 2023-12-22 NOTE — TELEPHONE ENCOUNTER
Patient called and said that her last appointment that her and Dr. Tanesha Dupont talked about an antibiotic and none were ordered. I do not see any note I will send msg to Dr. Tanesha Dupont.

## 2023-12-26 ENCOUNTER — OFFICE VISIT (OUTPATIENT)
Dept: PULMONOLOGY | Age: 78
End: 2023-12-26
Payer: MEDICARE

## 2023-12-26 VITALS
TEMPERATURE: 98 F | HEART RATE: 71 BPM | SYSTOLIC BLOOD PRESSURE: 104 MMHG | WEIGHT: 261.6 LBS | OXYGEN SATURATION: 92 % | HEIGHT: 67 IN | DIASTOLIC BLOOD PRESSURE: 66 MMHG | BODY MASS INDEX: 41.06 KG/M2

## 2023-12-26 DIAGNOSIS — G47.33 OSA ON CPAP: Primary | ICD-10-CM

## 2023-12-26 DIAGNOSIS — E66.01 MORBID OBESITY WITH BMI OF 40.0-44.9, ADULT (HCC): ICD-10-CM

## 2023-12-26 PROCEDURE — 1123F ACP DISCUSS/DSCN MKR DOCD: CPT | Performed by: PHYSICIAN ASSISTANT

## 2023-12-26 PROCEDURE — 99213 OFFICE O/P EST LOW 20 MIN: CPT | Performed by: PHYSICIAN ASSISTANT

## 2023-12-26 NOTE — TELEPHONE ENCOUNTER
I called and spoke with patient and let her know about the prescription that was sent in for her. Pt stated that she got the prescription and thanked me.

## 2023-12-27 NOTE — FLOWSHEET NOTE
Follow all instructions given by your physician  Do not eat or drink anything after midnight prior to surgery(includes water, chewing gum, mints and ice chips)  Sips of water am of surgery with allowed medications  Do not use chewing tobacco after midnight prior to surgery  May brush teeth do not swallow water  Do not smoke, drink alcoholic beverages or use any illicit drugs for 24 hours prior to surgery  Bring insurance info and photo ID  Bring pertinent paperwork with you from Doctor or surgeons's office  Wear clean comfortable, loose-fitting clothing  No make-up, nail polish, jewelry, piercings, or contact lenses to be worn day of surgery  No glue on dentures morning of surgery; you will be asked to remove them for surgery. Case for glasses.  Shower the night before and the morning of surgery with cleansing soap provided or a liquid antibacterial soap, dry with new fresh clean towel after each shower, no lotions, creams or powder.  Clean sheets and pillowcase on bed night before surgery  Bring medications in original bottles, Bring rescue inhalers with you  Bring CPAP/BIPAP machine if you have one ( you may be charged if one is needed in recovery room )yes not bringing no pacemaker      Our pharmacy has a Meds to Beds program where they will deliver any new prescriptions you may have to your room before you leave. Our Pharmacy will clear it through your insurance; for example (same co pay). This enables you to take your new RX as soon as you need when you get home and avoids stop/wait delays on the way home.  Please have a form of payment with you and have someone designated as your Pharmacy contact with their phone # as you may not feel well or still be under the influence of anesthesia.    Please refer to the SSI-Surgical Site Infection Flyer you hopefully received in the mail-together we can prevent infections; signs and symptoms reviewed.  When discharged be sure you understand how to care for your wound and

## 2023-12-27 NOTE — PROGRESS NOTES
PAT call attempted, patient unavailable, left message to please call us back at your earliest convenience; [phone]

## 2024-01-03 RX ORDER — SODIUM CHLORIDE 9 MG/ML
INJECTION, SOLUTION INTRAVENOUS CONTINUOUS
Status: CANCELLED | OUTPATIENT
Start: 2024-01-03

## 2024-01-03 RX ORDER — SODIUM CHLORIDE 0.9 % (FLUSH) 0.9 %
5-40 SYRINGE (ML) INJECTION PRN
Status: CANCELLED | OUTPATIENT
Start: 2024-01-03

## 2024-01-03 RX ORDER — SODIUM CHLORIDE 9 MG/ML
INJECTION, SOLUTION INTRAVENOUS PRN
Status: CANCELLED | OUTPATIENT
Start: 2024-01-03

## 2024-01-03 RX ORDER — SODIUM CHLORIDE 0.9 % (FLUSH) 0.9 %
5-40 SYRINGE (ML) INJECTION EVERY 12 HOURS SCHEDULED
Status: CANCELLED | OUTPATIENT
Start: 2024-01-03

## 2024-01-04 ENCOUNTER — HOSPITAL ENCOUNTER (OUTPATIENT)
Age: 79
Setting detail: OUTPATIENT SURGERY
Discharge: HOME | End: 2024-01-04
Attending: OTOLARYNGOLOGY | Admitting: OTOLARYNGOLOGY
Payer: MEDICARE

## 2024-01-04 ENCOUNTER — ANESTHESIA (OUTPATIENT)
Dept: OPERATING ROOM | Age: 79
End: 2024-01-04
Payer: MEDICARE

## 2024-01-04 ENCOUNTER — ANESTHESIA EVENT (OUTPATIENT)
Dept: OPERATING ROOM | Age: 79
End: 2024-01-04
Payer: MEDICARE

## 2024-01-04 VITALS
SYSTOLIC BLOOD PRESSURE: 143 MMHG | BODY MASS INDEX: 40.97 KG/M2 | TEMPERATURE: 97 F | OXYGEN SATURATION: 96 % | RESPIRATION RATE: 16 BRPM | HEART RATE: 100 BPM | HEIGHT: 67 IN | WEIGHT: 261 LBS | DIASTOLIC BLOOD PRESSURE: 72 MMHG

## 2024-01-04 DIAGNOSIS — J32.2 CHRONIC ETHMOIDAL SINUSITIS: ICD-10-CM

## 2024-01-04 DIAGNOSIS — G89.18 ACUTE POST-OPERATIVE PAIN: Primary | ICD-10-CM

## 2024-01-04 DIAGNOSIS — J32.3 CHRONIC SPHENOIDAL SINUSITIS: ICD-10-CM

## 2024-01-04 DIAGNOSIS — J32.9 CHRONIC RHINOSINUSITIS: ICD-10-CM

## 2024-01-04 DIAGNOSIS — J32.0 CHRONIC MAXILLARY SINUSITIS: ICD-10-CM

## 2024-01-04 PROCEDURE — 3700000001 HC ADD 15 MINUTES (ANESTHESIA): Performed by: OTOLARYNGOLOGY

## 2024-01-04 PROCEDURE — 7100000011 HC PHASE II RECOVERY - ADDTL 15 MIN: Performed by: OTOLARYNGOLOGY

## 2024-01-04 PROCEDURE — 6360000002 HC RX W HCPCS: Performed by: REGISTERED NURSE

## 2024-01-04 PROCEDURE — 2720000010 HC SURG SUPPLY STERILE: Performed by: OTOLARYNGOLOGY

## 2024-01-04 PROCEDURE — 2500000003 HC RX 250 WO HCPCS: Performed by: REGISTERED NURSE

## 2024-01-04 PROCEDURE — 6360000002 HC RX W HCPCS: Performed by: NURSE PRACTITIONER

## 2024-01-04 PROCEDURE — 2500000003 HC RX 250 WO HCPCS: Performed by: NURSE ANESTHETIST, CERTIFIED REGISTERED

## 2024-01-04 PROCEDURE — 61782 SCAN PROC CRANIAL EXTRA: CPT | Performed by: OTOLARYNGOLOGY

## 2024-01-04 PROCEDURE — 6360000002 HC RX W HCPCS: Performed by: OTOLARYNGOLOGY

## 2024-01-04 PROCEDURE — 31267 ENDOSCOPY MAXILLARY SINUS: CPT | Performed by: OTOLARYNGOLOGY

## 2024-01-04 PROCEDURE — 31259 NSL/SINS NDSC SPHN TISS RMVL: CPT | Performed by: OTOLARYNGOLOGY

## 2024-01-04 PROCEDURE — 7100000010 HC PHASE II RECOVERY - FIRST 15 MIN: Performed by: OTOLARYNGOLOGY

## 2024-01-04 PROCEDURE — APPNB45 APP NON BILLABLE 31-45 MINUTES: Performed by: NURSE PRACTITIONER

## 2024-01-04 PROCEDURE — 88305 TISSUE EXAM BY PATHOLOGIST: CPT

## 2024-01-04 PROCEDURE — 3600000004 HC SURGERY LEVEL 4 BASE: Performed by: OTOLARYNGOLOGY

## 2024-01-04 PROCEDURE — 3600000014 HC SURGERY LEVEL 4 ADDTL 15MIN: Performed by: OTOLARYNGOLOGY

## 2024-01-04 PROCEDURE — 31267 ENDOSCOPY MAXILLARY SINUS: CPT | Performed by: NURSE PRACTITIONER

## 2024-01-04 PROCEDURE — 7100000001 HC PACU RECOVERY - ADDTL 15 MIN: Performed by: OTOLARYNGOLOGY

## 2024-01-04 PROCEDURE — 6370000000 HC RX 637 (ALT 250 FOR IP): Performed by: NURSE ANESTHETIST, CERTIFIED REGISTERED

## 2024-01-04 PROCEDURE — 6360000002 HC RX W HCPCS: Performed by: NURSE ANESTHETIST, CERTIFIED REGISTERED

## 2024-01-04 PROCEDURE — 31257 NSL/SINS NDSC TOT W/SPHENDT: CPT | Performed by: NURSE PRACTITIONER

## 2024-01-04 PROCEDURE — 6370000000 HC RX 637 (ALT 250 FOR IP): Performed by: OTOLARYNGOLOGY

## 2024-01-04 PROCEDURE — 61782 SCAN PROC CRANIAL EXTRA: CPT | Performed by: NURSE PRACTITIONER

## 2024-01-04 PROCEDURE — 2580000003 HC RX 258: Performed by: NURSE PRACTITIONER

## 2024-01-04 PROCEDURE — 7100000000 HC PACU RECOVERY - FIRST 15 MIN: Performed by: OTOLARYNGOLOGY

## 2024-01-04 PROCEDURE — 2709999900 HC NON-CHARGEABLE SUPPLY: Performed by: OTOLARYNGOLOGY

## 2024-01-04 PROCEDURE — 3700000000 HC ANESTHESIA ATTENDED CARE: Performed by: OTOLARYNGOLOGY

## 2024-01-04 RX ORDER — ONDANSETRON 2 MG/ML
INJECTION INTRAMUSCULAR; INTRAVENOUS PRN
Status: DISCONTINUED | OUTPATIENT
Start: 2024-01-04 | End: 2024-01-04 | Stop reason: SDUPTHER

## 2024-01-04 RX ORDER — LIDOCAINE HCL/PF 100 MG/5ML
SYRINGE (ML) INJECTION PRN
Status: DISCONTINUED | OUTPATIENT
Start: 2024-01-04 | End: 2024-01-04 | Stop reason: SDUPTHER

## 2024-01-04 RX ORDER — MINERAL OIL AND WHITE PETROLATUM 150; 830 MG/G; MG/G
OINTMENT OPHTHALMIC PRN
Status: DISCONTINUED | OUTPATIENT
Start: 2024-01-04 | End: 2024-01-04 | Stop reason: SDUPTHER

## 2024-01-04 RX ORDER — CIPROFLOXACIN 750 MG/1
750 TABLET, FILM COATED ORAL 2 TIMES DAILY
Qty: 20 TABLET | Refills: 0 | Status: SHIPPED | OUTPATIENT
Start: 2024-01-04 | End: 2024-01-14

## 2024-01-04 RX ORDER — ONDANSETRON 2 MG/ML
4 INJECTION INTRAMUSCULAR; INTRAVENOUS
Status: CANCELLED | OUTPATIENT
Start: 2024-01-04 | End: 2024-01-05

## 2024-01-04 RX ORDER — SODIUM CHLORIDE 0.9 % (FLUSH) 0.9 %
5-40 SYRINGE (ML) INJECTION EVERY 12 HOURS SCHEDULED
Status: CANCELLED | OUTPATIENT
Start: 2024-01-04

## 2024-01-04 RX ORDER — FENTANYL CITRATE 50 UG/ML
50 INJECTION, SOLUTION INTRAMUSCULAR; INTRAVENOUS EVERY 5 MIN PRN
Status: CANCELLED | OUTPATIENT
Start: 2024-01-04

## 2024-01-04 RX ORDER — ONDANSETRON 2 MG/ML
4 INJECTION INTRAMUSCULAR; INTRAVENOUS ONCE
Status: COMPLETED | OUTPATIENT
Start: 2024-01-04 | End: 2024-01-04

## 2024-01-04 RX ORDER — SODIUM CHLORIDE 0.9 % (FLUSH) 0.9 %
5-40 SYRINGE (ML) INJECTION EVERY 12 HOURS SCHEDULED
Status: DISCONTINUED | OUTPATIENT
Start: 2024-01-04 | End: 2024-01-04 | Stop reason: HOSPADM

## 2024-01-04 RX ORDER — LABETALOL HYDROCHLORIDE 5 MG/ML
INJECTION INTRAVENOUS PRN
Status: DISCONTINUED | OUTPATIENT
Start: 2024-01-04 | End: 2024-01-04 | Stop reason: SDUPTHER

## 2024-01-04 RX ORDER — SODIUM CHLORIDE 9 MG/ML
INJECTION, SOLUTION INTRAVENOUS PRN
Status: CANCELLED | OUTPATIENT
Start: 2024-01-04

## 2024-01-04 RX ORDER — HYDRALAZINE HYDROCHLORIDE 20 MG/ML
INJECTION INTRAMUSCULAR; INTRAVENOUS PRN
Status: DISCONTINUED | OUTPATIENT
Start: 2024-01-04 | End: 2024-01-04 | Stop reason: SDUPTHER

## 2024-01-04 RX ORDER — DEXAMETHASONE SODIUM PHOSPHATE 10 MG/ML
10 INJECTION, EMULSION INTRAMUSCULAR; INTRAVENOUS ONCE
Status: COMPLETED | OUTPATIENT
Start: 2024-01-04 | End: 2024-01-04

## 2024-01-04 RX ORDER — MIDAZOLAM HYDROCHLORIDE 1 MG/ML
INJECTION INTRAMUSCULAR; INTRAVENOUS PRN
Status: DISCONTINUED | OUTPATIENT
Start: 2024-01-04 | End: 2024-01-04 | Stop reason: SDUPTHER

## 2024-01-04 RX ORDER — SODIUM CHLORIDE 0.9 % (FLUSH) 0.9 %
5-40 SYRINGE (ML) INJECTION PRN
Status: DISCONTINUED | OUTPATIENT
Start: 2024-01-04 | End: 2024-01-04 | Stop reason: HOSPADM

## 2024-01-04 RX ORDER — OXYMETAZOLINE HYDROCHLORIDE 0.05 G/100ML
SPRAY NASAL PRN
Status: DISCONTINUED | OUTPATIENT
Start: 2024-01-04 | End: 2024-01-04 | Stop reason: ALTCHOICE

## 2024-01-04 RX ORDER — SODIUM CHLORIDE 0.9 % (FLUSH) 0.9 %
5-40 SYRINGE (ML) INJECTION PRN
Status: CANCELLED | OUTPATIENT
Start: 2024-01-04

## 2024-01-04 RX ORDER — MEPERIDINE HYDROCHLORIDE 25 MG/ML
12.5 INJECTION INTRAMUSCULAR; INTRAVENOUS; SUBCUTANEOUS EVERY 5 MIN PRN
Status: CANCELLED | OUTPATIENT
Start: 2024-01-04

## 2024-01-04 RX ORDER — ROCURONIUM BROMIDE 10 MG/ML
INJECTION, SOLUTION INTRAVENOUS PRN
Status: DISCONTINUED | OUTPATIENT
Start: 2024-01-04 | End: 2024-01-04 | Stop reason: SDUPTHER

## 2024-01-04 RX ORDER — HYDROMORPHONE HYDROCHLORIDE 2 MG/ML
INJECTION, SOLUTION INTRAMUSCULAR; INTRAVENOUS; SUBCUTANEOUS PRN
Status: DISCONTINUED | OUTPATIENT
Start: 2024-01-04 | End: 2024-01-04 | Stop reason: SDUPTHER

## 2024-01-04 RX ORDER — FENTANYL CITRATE 50 UG/ML
INJECTION, SOLUTION INTRAMUSCULAR; INTRAVENOUS PRN
Status: DISCONTINUED | OUTPATIENT
Start: 2024-01-04 | End: 2024-01-04 | Stop reason: SDUPTHER

## 2024-01-04 RX ORDER — TRANEXAMIC ACID 100 MG/ML
INJECTION, SOLUTION INTRAVENOUS PRN
Status: DISCONTINUED | OUTPATIENT
Start: 2024-01-04 | End: 2024-01-04 | Stop reason: SDUPTHER

## 2024-01-04 RX ORDER — SODIUM CHLORIDE 9 MG/ML
INJECTION, SOLUTION INTRAVENOUS PRN
Status: DISCONTINUED | OUTPATIENT
Start: 2024-01-04 | End: 2024-01-04 | Stop reason: HOSPADM

## 2024-01-04 RX ORDER — HYDROCODONE BITARTRATE AND ACETAMINOPHEN 5; 325 MG/1; MG/1
1 TABLET ORAL EVERY 6 HOURS PRN
Qty: 20 TABLET | Refills: 0 | Status: SHIPPED | OUTPATIENT
Start: 2024-01-04 | End: 2024-01-09

## 2024-01-04 RX ORDER — METOPROLOL TARTRATE 1 MG/ML
INJECTION, SOLUTION INTRAVENOUS PRN
Status: DISCONTINUED | OUTPATIENT
Start: 2024-01-04 | End: 2024-01-04 | Stop reason: SDUPTHER

## 2024-01-04 RX ORDER — SODIUM CHLORIDE 9 MG/ML
INJECTION, SOLUTION INTRAVENOUS CONTINUOUS
Status: DISCONTINUED | OUTPATIENT
Start: 2024-01-04 | End: 2024-01-04 | Stop reason: HOSPADM

## 2024-01-04 RX ORDER — PROPOFOL 10 MG/ML
INJECTION, EMULSION INTRAVENOUS PRN
Status: DISCONTINUED | OUTPATIENT
Start: 2024-01-04 | End: 2024-01-04 | Stop reason: SDUPTHER

## 2024-01-04 RX ORDER — HYDROCODONE BITARTRATE AND ACETAMINOPHEN 5; 325 MG/1; MG/1
1 TABLET ORAL ONCE
Status: COMPLETED | OUTPATIENT
Start: 2024-01-04 | End: 2024-01-04

## 2024-01-04 RX ADMIN — HYDRALAZINE HYDROCHLORIDE 10 MG: 20 INJECTION, SOLUTION INTRAMUSCULAR; INTRAVENOUS at 16:48

## 2024-01-04 RX ADMIN — SUGAMMADEX 200 MG: 100 INJECTION, SOLUTION INTRAVENOUS at 17:00

## 2024-01-04 RX ADMIN — HYDRALAZINE HYDROCHLORIDE 10 MG: 20 INJECTION, SOLUTION INTRAMUSCULAR; INTRAVENOUS at 16:26

## 2024-01-04 RX ADMIN — MINERAL OIL AND WHITE PETROLATUM 2 INCH: 150; 830 OINTMENT OPHTHALMIC at 14:36

## 2024-01-04 RX ADMIN — FENTANYL CITRATE 100 MCG: 50 INJECTION, SOLUTION INTRAMUSCULAR; INTRAVENOUS at 14:35

## 2024-01-04 RX ADMIN — MIDAZOLAM 2 MG: 1 INJECTION INTRAMUSCULAR; INTRAVENOUS at 14:27

## 2024-01-04 RX ADMIN — PIPERACILLIN SODIUM,TAZOBACTAM SODIUM 3375 MG: 3; .375 INJECTION, POWDER, FOR SOLUTION INTRAVENOUS at 14:38

## 2024-01-04 RX ADMIN — METOPROLOL TARTRATE 5 MG: 5 INJECTION INTRAVENOUS at 15:42

## 2024-01-04 RX ADMIN — ONDANSETRON 4 MG: 2 INJECTION INTRAMUSCULAR; INTRAVENOUS at 14:46

## 2024-01-04 RX ADMIN — PROPOFOL 50 MG: 10 INJECTION, EMULSION INTRAVENOUS at 14:47

## 2024-01-04 RX ADMIN — DEXAMETHASONE SODIUM PHOSPHATE 10 MG: 10 INJECTION, EMULSION INTRAMUSCULAR; INTRAVENOUS at 10:51

## 2024-01-04 RX ADMIN — ROCURONIUM BROMIDE 10 MG: 10 INJECTION INTRAVENOUS at 15:02

## 2024-01-04 RX ADMIN — LABETALOL HYDROCHLORIDE 5 MG: 5 INJECTION INTRAVENOUS at 16:05

## 2024-01-04 RX ADMIN — LABETALOL HYDROCHLORIDE 5 MG: 5 INJECTION INTRAVENOUS at 16:14

## 2024-01-04 RX ADMIN — SODIUM CHLORIDE: 9 INJECTION, SOLUTION INTRAVENOUS at 15:51

## 2024-01-04 RX ADMIN — ROCURONIUM BROMIDE 40 MG: 10 INJECTION INTRAVENOUS at 14:31

## 2024-01-04 RX ADMIN — SODIUM CHLORIDE: 9 INJECTION, SOLUTION INTRAVENOUS at 10:50

## 2024-01-04 RX ADMIN — ONDANSETRON 4 MG: 2 INJECTION INTRAMUSCULAR; INTRAVENOUS at 18:31

## 2024-01-04 RX ADMIN — HYDROCODONE BITARTRATE AND ACETAMINOPHEN 1 TABLET: 5; 325 TABLET ORAL at 18:00

## 2024-01-04 RX ADMIN — HYDROMORPHONE HYDROCHLORIDE 0.5 MG: 2 INJECTION INTRAMUSCULAR; INTRAVENOUS; SUBCUTANEOUS at 15:49

## 2024-01-04 RX ADMIN — PROPOFOL 150 MG: 10 INJECTION, EMULSION INTRAVENOUS at 14:31

## 2024-01-04 RX ADMIN — Medication 100 MG: at 14:31

## 2024-01-04 RX ADMIN — TRANEXAMIC ACID 1000 MG: 100 INJECTION, SOLUTION INTRAVENOUS at 16:44

## 2024-01-04 ASSESSMENT — PAIN DESCRIPTION - LOCATION: LOCATION: HEAD

## 2024-01-04 ASSESSMENT — PAIN DESCRIPTION - ORIENTATION: ORIENTATION: INNER

## 2024-01-04 ASSESSMENT — PAIN SCALES - GENERAL
PAINLEVEL_OUTOF10: 0
PAINLEVEL_OUTOF10: 5

## 2024-01-04 ASSESSMENT — PAIN - FUNCTIONAL ASSESSMENT: PAIN_FUNCTIONAL_ASSESSMENT: 0-10

## 2024-01-04 ASSESSMENT — PAIN DESCRIPTION - DESCRIPTORS: DESCRIPTORS: ACHING

## 2024-01-04 NOTE — ANESTHESIA POSTPROCEDURE EVALUATION
Department of Anesthesiology  Postprocedure Note    Patient: Eva Orellana  MRN: [id number]  YOB: 1945  Date of evaluation: 1/4/2024    Procedure Summary       Date: 01/04/24 Room / Location: [facility] OR  / [facility] OR    Anesthesia Start: 1427 Anesthesia Stop: 1711    Procedure: Image Guided Surgery for Total Anterior And Posterior Ethmoidectomy Including Sphenoidotomy, Bilateral Maxillary Antrostomy with Removal of tissue from the Maxillary Sinus (Bilateral: Nose) Diagnosis:       Chronic rhinosinusitis      Chronic ethmoidal sinusitis      Chronic sphenoidal sinusitis      Chronic maxillary sinusitis      (Chronic rhinosinusitis [J32.9])      (Chronic ethmoidal sinusitis [J32.2])      (Chronic sphenoidal sinusitis [J32.3])      (Chronic maxillary sinusitis [J32.0])    Surgeons: Jose A Spann MD Responsible Provider: Austen Duke DO    Anesthesia Type: general ASA Status: 3            Anesthesia Type: No value filed.    Allen Phase I: Allen Score: 9    Allen Phase II: Allen Score: 9    Anesthesia Post Evaluation    Patient location during evaluation: PACU  Patient participation: complete - patient participated  Level of consciousness: awake and alert  Airway patency: patent  Nausea & Vomiting: no vomiting and nausea (Zofran given)  Cardiovascular status: hemodynamically stable  Respiratory status: acceptable  Hydration status: stable  Pain management: adequate    No notable events documented.

## 2024-01-04 NOTE — H&P
Adapted from prior ENT note:    Chronic ethmoidal sinusitis; Chronic maxillary sinusitis  S/p FESS  No new symptoms    Past Medical History:   Diagnosis Date    Allergic rhinitis     Arthritis     Chronic kidney disease     pt doens't know why this is in there    Claustrophobia 02/24/2016    Hyperlipidemia     Obesity     ARTIS on CPAP     uses cpap    ARTIS on CPAP 10/18/2017    Thyroid disease        Past Surgical History:   Procedure Laterality Date    COLONOSCOPY  2007    [facility]    EYE SURGERY Right 02/28/2019    Cataract Surgery with Dr. Sullivan    EYE SURGERY Left 03/13/2019    Cataract Surgery with Dr. Sullivan    KNEE SURGERY Left 2016    Dr Rosa  [facility]    KNEE SURGERY Right 2021    NASAL/SINUS ENDOSCOPY N/A 7/13/2023    Herminia Bullosa Resection performed by Jose A Spann MD at [facility] OR    MN COLON CA SCRN NOT HI RSK IND Left 01/08/2018    COLONOSCOPY SCREENING performed by Tao Malagon DO at [facility] Endoscopy       No Known Allergies    Current Facility-Administered Medications   Medication Dose Route Frequency Provider Last Rate Last Admin    piperacillin-tazobactam (ZOSYN) 3,375 mg in sodium chloride 0.9 % 50 mL IVPB (mini-bag)  3,375 mg IntraVENous Once Dunifon, Crystal L, APRN - CNP        sodium chloride flush 0.9 % injection 5-40 mL  5-40 mL IntraVENous 2 times per day Dunifon, Crystal L, APRN - CNP        sodium chloride flush 0.9 % injection 5-40 mL  5-40 mL IntraVENous PRN Dunifon, Crystal L, APRN - CNP        0.9 % sodium chloride infusion   IntraVENous PRN Dunifon, Crystal L, APRN - CNP        0.9 % sodium chloride infusion   IntraVENous Continuous Dunifon, Crystal L, APRN - CNP 75 mL/hr at 01/04/24 1050 New Bag at 01/04/24 1050       Current vitals  /75   Pulse 74   Temp (!) 96.7 °F (35.9 °C) (Tympanic)   Resp 20   Ht 1.702 m (5' 7\")   Wt 118.4 kg (261 lb)   LMP 04/15/1996   SpO2 95%   BMI 40.88 kg/m²     Proceed with original surgical plan:  Image Guided Surgery for Total Anterior And  to have shoulder surgery.  I would agree to postponing her planned procedure and even canceling it if she can become symptom-free without undue suppression of her immunity.  Last option remains to be seen as to whether or not it is prudent and for the moment we will look for an OR date that is a week or 2 after his surgery so as not to put it off too far into the future.  After much discussion we arrived at this acceptable compromise that will not unduly risk her health.     I spent over 30 minutes of face-to-face time with the patient the majority of which was dedicated to reviewing her complex history and structuring her follow-up care        Return in about 2 months (around 2/13/2024).           **This report has been created using voice recognition software. It may contain minor errors which are inherent in voice recognition technology.**

## 2024-01-04 NOTE — PROGRESS NOTES
Pt returned to [facility] room 20. Vitals and assessment as charted. 0.9 infusing,  to count from PACU. Pt has crackers and water.  at the bedside. Pt and  verbalized understanding of discharge criteria and call light use. Call light in reach.

## 2024-01-04 NOTE — ANESTHESIA PRE PROCEDURE
Department of Anesthesiology  Preprocedure Note       Name:  Eva Orellana   Age:  78 y.o.  :  1945                                          MRN:  [id number]         Date:  2024      Surgeon: Surgeon(s):  Jose A Spann MD    Procedure: Procedure(s):  Image Guided Surgery for Total Anterior And Posterior Ethmoidectomy Including Sphenoidotomy, Bilateral Maxillary Antrostomy with Removal of tissue from the Maxillary Sinus    Medications prior to admission:   Prior to Admission medications    Medication Sig Start Date End Date Taking? Authorizing Provider   Multiple Vitamin (MULTIVITAMIN PO) Take 1 each by mouth daily Ultralife 121 one tablet daily.    Amparo Arcos MD   Multiple Vitamins-Minerals (PRESERVISION AREDS PO) Take 2 tablets by mouth daily    Amparo Arcos MD   OLIVE LEAF PO Take 8 tablets by mouth as needed (for injection)    Amparo Arcos MD   pseudoephedrine (SUDAFED) 30 MG tablet Take 1 tablet by mouth 3 times daily    Amparo Arcos MD   levothyroxine (SYNTHROID) 150 MCG tablet TAKE 1 TABLET DAILY WITH WATER ON AN EMPTY STOMACH - WAIT 30 MINUTES BEFORE EATING OR TAKING OTHER MEDICATIONS 23   Ben English DO   Lactobacillus (ACIDOPHILUS) 100 MG CAPS Take 1 capsule by mouth daily    Amparo Arcos MD   triamcinolone (NASACORT) 55 MCG/ACT nasal inhaler 2 sprays by Nasal route daily    Amparo Arcos MD   vitamin B-12 (CYANOCOBALAMIN) 500 MCG tablet Take 1 tablet by mouth daily Indications: 2500mg a day    Amparo Arcos MD   Niacin (VITAMIN B-3 PO) Take 1 tablet by mouth daily    Amparo Arcos MD   Magnesium 500 MG CAPS Take 1 caplet by mouth daily 4 kind of magnesium in one pill.    Amparo Arcos MD   vitamin E 400 UNIT capsule Take 2 capsules by mouth daily    Amparo Arcos MD   Cholecalciferol (VITAMIN D3) 03702 UNITS CAPS Take 5,000 Units by mouth daily    Amparo Arcos MD       Current

## 2024-01-04 NOTE — PROGRESS NOTES
1709- pt to pacu. VSS. Pt denies pain, nausea at this time. Pt appears in no acute distress.    1716- pt sitting up in bed sleepy, responds appropriately. VSS, pt continues to deny pain,nausea.     1718- Dr Spann at bedside to see pt.    1721- pt tolerates ice chips without difficulty.    1739- pt continues to deny pain,n ausea, tolerate ice. Pt meets criteria for discharge from pacu    1742- Pt returned to [facility], report given to Kat

## 2024-01-04 NOTE — PROGRESS NOTES
Pt admitted to hospitals room 20 and oriented to unit. SCD sleeves applied. Nares swabbed. Pt verbalized permission for first name, last initial and physicians name on white board. SDS board and discharge criteria explained, pt and family verbalized understanding. Pt denies thoughts of harming self or others. Call light in reach. Family at the bedside.  Prem [phone]

## 2024-01-04 NOTE — DISCHARGE INSTRUCTIONS
Instructions specific for  from Dr. Jose A Spann:  1.  Rest; sleep propped up or in an easy chair for the next 3 nights  2.  No nose blowing whatsoever for 10 days; distinct risk for blowing air and infected mucus into your orbit  3.  Begin rinsing your sinuses with saline 24 hours after bleeding largely abates.  4.  Change her nasal drip pad twice a day and as needed for saturation.  5.  Brisk bleeding is unlikely but should it occur lean forward to allow it to drain out of your nose into a container so the amount you bleed can be quantified.  This will also protect you from swallowing the blood which will make you sick and potentially complicate your care if you need anesthesia to undergo a cautery procedure.  If this occurs and is persistent, come to the emergency room and have me or one of my partners called.    For emergencies:  Jose A Spann MD  Cell: [phone]

## 2024-01-05 NOTE — PROGRESS NOTES
Pt has met discharge criteria and states she is ready for discharge to home. IV removed, gauze and tape applied. Dressed in own clothes and personal belongings gathered. Discharge instructions (with opioid medication education information) given to pt and ; pt and  verbalized understanding of discharge instructions, prescriptions and follow up appointments. Pt transported to discharge lobby by \[facility]\"" staff.

## 2024-01-05 NOTE — OP NOTE
Operative Note      Patient: Eva Orellana  YOB: 1945  MRN: [id number]    Date of Procedure: 1/4/2024    Pre-Op Diagnosis Codes:     * Chronic rhinosinusitis [J32.9]     * Chronic ethmoidal sinusitis [J32.2]     * Chronic sphenoidal sinusitis [J32.3]     * Chronic maxillary sinusitis [J32.0]    Post-Op Diagnosis: Same       Procedure(s):  Image Guided Surgery for Total Anterior And Posterior Ethmoidectomy Including Sphenoidotomy, Bilateral Maxillary Antrostomy with Removal of tissue from the Maxillary Sinus    Surgeon(s):  Jose A Spann MD    Assistant at surgery:   RAMIRO Paniagua APRN    Anesthesia: General    Estimated Blood Loss (mL): 300     Complications: None    Specimens:   ID Type Source Tests Collected by Time Destination   A : Right Sphenoid Sinus Tissue Nose SURGICAL PATHOLOGY Jose A Spann MD 1/4/2024 1614    B : Left Sphenoid Sinus Tissue Nose SURGICAL PATHOLOGY Jose A Spann MD 1/4/2024 1625    C : Left Maxillary antrostomy Tissue Nose SURGICAL PATHOLOGY Jose A Spann MD 1/4/2024 1639    D : Right Maxillary Antrostomy Tissue Nose SURGICAL PATHOLOGY Jose A Spann MD 1/4/2024 1639        Implants:  * No implants in log *      Drains: * No LDAs found *    Findings: 1.  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  A high nasal septal perforation running out along the skull base  3.  Extensive mucous membrane hyperemia and congestion with obvious sinus obstruction of the majority of the ethmoid sinus passages, the maxillary sinuses and the sphenoid sinuses.        Detailed Description of Procedure:   The patient was taken to the operating room awake and placed in supine position.  General anesthesia was induced and the patient was intubated with a 7.0 endotracheal tube without difficulty or vital sign instability.  The table was turned 90 degrees and the patient was prepped and draped in usual fashion for an aseptic approach

## 2024-01-11 ENCOUNTER — PATIENT MESSAGE (OUTPATIENT)
Dept: FAMILY MEDICINE CLINIC | Age: 79
End: 2024-01-11

## 2024-01-11 NOTE — TELEPHONE ENCOUNTER
From: Eva Orellana  To: Bertha Viera  Sent: 1/11/2024 2:55 PM EST  Subject: Other issues    Ms Viera----  Had sinus surergy LAST Thursday and am recovering slowly. So believing my constant sinus infection is over, but I am still having the following symtoms:  ----chills (maybe 3-5 times a day)  ----moments of being very hot  ----lympths are much better but I still are aware they are there  ----night sweats have begun  ----no energy  ----mood swings  ----hair is thinning    I know i am 78 years old but I feel like I have much to do yet and these symtoms are making me miserable. Is this something that you and I can address ? Can you help?    Alicia Orellana  [phone]

## 2024-01-15 ENCOUNTER — OFFICE VISIT (OUTPATIENT)
Dept: ENT CLINIC | Age: 79
End: 2024-01-15
Payer: MEDICARE

## 2024-01-15 VITALS
WEIGHT: 262.8 LBS | HEART RATE: 85 BPM | DIASTOLIC BLOOD PRESSURE: 64 MMHG | OXYGEN SATURATION: 95 % | HEIGHT: 67 IN | BODY MASS INDEX: 41.25 KG/M2 | TEMPERATURE: 97.3 F | SYSTOLIC BLOOD PRESSURE: 110 MMHG | RESPIRATION RATE: 18 BRPM

## 2024-01-15 DIAGNOSIS — J32.9 CHRONIC RHINOSINUSITIS: ICD-10-CM

## 2024-01-15 DIAGNOSIS — Z98.890 STATUS POST FUNCTIONAL ENDOSCOPIC SINUS SURGERY: Primary | ICD-10-CM

## 2024-01-15 DIAGNOSIS — E66.01 OBESITY, CLASS III, BMI 40-49.9 (MORBID OBESITY) (HCC): ICD-10-CM

## 2024-01-15 PROBLEM — E66.813 OBESITY, CLASS III, BMI 40-49.9 (MORBID OBESITY) (HCC): Status: ACTIVE | Noted: 2024-01-15

## 2024-01-15 PROCEDURE — 1123F ACP DISCUSS/DSCN MKR DOCD: CPT | Performed by: OTOLARYNGOLOGY

## 2024-01-15 PROCEDURE — 99214 OFFICE O/P EST MOD 30 MIN: CPT | Performed by: OTOLARYNGOLOGY

## 2024-01-15 PROCEDURE — 31237 NSL/SINS NDSC SURG BX POLYPC: CPT | Performed by: OTOLARYNGOLOGY

## 2024-01-15 NOTE — PROGRESS NOTES
LMP 04/15/1996   SpO2 95%   BMI 41.15 kg/m²     Physical Exam       General physical exam the patient is a pleasant alert cooperative well-appearing older adult female in no acute distress.  Her voice and speech pattern are within normal limits for age and gender except for mild to moderate hyponasality.    Procedure: Nasal endoscopy with debridement; bilateral  Indication: The patient is status post extensive paranasal sinus surgery and warrants debridement to promote healthy healing that will reduce the risk of synechia formation and granuloma that may obstruct the nasal airway.  Findings: The patient's nasal airways were first sprayed with topical decongestant and then topical 4% lidocaine.  I performed her endoscopy with a 30 degree optical telescope and used a Lopez tip suction catheter as well as Blakesley forceps to remove soft tissue debris as described below.  The patient had extensive coagulum of both middle meatus areas that required a combination of Lopez tip suctioning and for Blakesley forceps to remove the large amount of debris necessary.  At the end of that the patient was breathing much better and very pleased with the outcome of the procedure.  Bleeding was very minimal and self-limited.    Nasal debridement images:                             Vitals reviewed.    No results found.   Lab Results   Component Value Date/Time     11/03/2023 09:31 AM     06/27/2023 10:33 AM     06/03/2022 10:18 AM    K 4.2 11/03/2023 09:31 AM    K 4.3 06/27/2023 10:33 AM    K 4.4 06/03/2022 10:18 AM     11/03/2023 09:31 AM     06/27/2023 10:33 AM     06/03/2022 10:18 AM    CO2 26 11/03/2023 09:31 AM    CO2 26 06/27/2023 10:33 AM    CO2 25 06/03/2022 10:18 AM    BUN 12 11/03/2023 09:31 AM    BUN 12 06/27/2023 10:33 AM    BUN 14 06/03/2022 10:18 AM    CREATININE 0.6 11/03/2023 09:31 AM    CREATININE 0.7 06/27/2023 10:33 AM    CREATININE 0.7 02/27/2023 11:40 AM    CALCIUM 9.3

## 2024-01-30 ENCOUNTER — OFFICE VISIT (OUTPATIENT)
Dept: ENT CLINIC | Age: 79
End: 2024-01-30
Payer: MEDICARE

## 2024-01-30 VITALS
HEIGHT: 67 IN | RESPIRATION RATE: 16 BRPM | BODY MASS INDEX: 40.84 KG/M2 | HEART RATE: 86 BPM | SYSTOLIC BLOOD PRESSURE: 130 MMHG | TEMPERATURE: 97.4 F | OXYGEN SATURATION: 98 % | WEIGHT: 260.2 LBS | DIASTOLIC BLOOD PRESSURE: 78 MMHG

## 2024-01-30 DIAGNOSIS — J32.9 CHRONIC RHINOSINUSITIS: ICD-10-CM

## 2024-01-30 DIAGNOSIS — Z98.890 STATUS POST FUNCTIONAL ENDOSCOPIC SINUS SURGERY: Primary | ICD-10-CM

## 2024-01-30 DIAGNOSIS — J34.89 REFRACTORY OBSTRUCTION OF NASAL AIRWAY: ICD-10-CM

## 2024-01-30 PROCEDURE — 1123F ACP DISCUSS/DSCN MKR DOCD: CPT | Performed by: OTOLARYNGOLOGY

## 2024-01-30 PROCEDURE — 99214 OFFICE O/P EST MOD 30 MIN: CPT | Performed by: OTOLARYNGOLOGY

## 2024-01-30 PROCEDURE — 31237 NSL/SINS NDSC SURG BX POLYPC: CPT | Performed by: OTOLARYNGOLOGY

## 2024-01-30 NOTE — PROGRESS NOTES
[facility] [place] [facility]  [facility] EAR, NOSE AND THROAT  [address]   [address]  [facility] [address]  Dept: [phone]  Dept Fax: [phone]  Loc: [phone]    Eva Orellana is a 78 y.o. female who was referred by No ref. provider found for:  Chief Complaint   Patient presents with    Post-Op Check     Patient here for post op exam.        HPI:     Eva Orellana is a 78 y.o. female 11 days status post extensive paranasal sinus surgery for chronic rhinosinusitis.  The patient reports having done well since the procedure with very little pain and good ease in self-care related to nasal irrigations.  The patient's  recently had surgery and for about 3 days she was unable to irrigate her nose at all.  She has since gotten back to it and is very concerned that she is not seeming to be able to clear out any of the debris within either side.  That said, she denies any pain of her face or eyes and therefore she is not so worried that she has returned to infections.  Nonetheless she  is eager to have her nose cleaned out of the debris that is likely still in there.  She continues to report feeling \"good\".     History:     No Known Allergies  Current Outpatient Medications   Medication Sig Dispense Refill    Multiple Vitamin (MULTIVITAMIN PO) Take 1 each by mouth daily Ultralife 121 one tablet daily.      Multiple Vitamins-Minerals (PRESERVISION AREDS PO) Take 2 tablets by mouth daily      OLIVE LEAF PO Take 8 tablets by mouth as needed (for injection)      levothyroxine (SYNTHROID) 150 MCG tablet TAKE 1 TABLET DAILY WITH WATER ON AN EMPTY STOMACH - WAIT 30 MINUTES BEFORE EATING OR TAKING OTHER MEDICATIONS 90 tablet 3    Lactobacillus (ACIDOPHILUS) 100 MG CAPS Take 1 capsule by mouth daily      triamcinolone (NASACORT) 55 MCG/ACT nasal inhaler 2 sprays by Nasal route daily      vitamin B-12 (CYANOCOBALAMIN) 500 MCG tablet Take 1 tablet by mouth daily Indications: 2500mg a day

## 2024-02-14 ENCOUNTER — OFFICE VISIT (OUTPATIENT)
Dept: FAMILY MEDICINE CLINIC | Age: 79
End: 2024-02-14

## 2024-02-14 VITALS
WEIGHT: 262.8 LBS | HEART RATE: 106 BPM | SYSTOLIC BLOOD PRESSURE: 132 MMHG | TEMPERATURE: 98 F | DIASTOLIC BLOOD PRESSURE: 82 MMHG | OXYGEN SATURATION: 98 % | BODY MASS INDEX: 41.15 KG/M2

## 2024-02-14 DIAGNOSIS — N64.4 CYCLICAL BREAST PAIN: ICD-10-CM

## 2024-02-14 DIAGNOSIS — J32.9 CHRONIC SINUSITIS, UNSPECIFIED LOCATION: ICD-10-CM

## 2024-02-14 DIAGNOSIS — R92.321 MAMMOGRAPHIC FIBROGLANDULAR DENSITY, RIGHT BREAST: ICD-10-CM

## 2024-02-14 DIAGNOSIS — Z86.39 HISTORY OF ADRENAL INSUFFICIENCY: ICD-10-CM

## 2024-02-14 DIAGNOSIS — R59.1 LYMPHADENOPATHY: ICD-10-CM

## 2024-02-14 DIAGNOSIS — R92.322 MAMMOGRAPHIC FIBROGLANDULAR DENSITY, LEFT BREAST: ICD-10-CM

## 2024-02-14 DIAGNOSIS — E03.9 HYPOTHYROIDISM, UNSPECIFIED TYPE: Primary | ICD-10-CM

## 2024-02-14 NOTE — PROGRESS NOTES
[address] [place] [id number]  Dept: [phone]          Eva Orellana (:  1945) is a 78 y.o. female,Established patient, here for evaluation of the following chief complaint(s):  Chills (No fever. Good in the morning but as the day progresses it gets worse, taking away from her energy.) and Breast Pain (Right one more then the left.)      ASSESSMENT/PLAN:  I have reviewed the patient's medical history in detail and updated the computerized patient record.  HPI/ROS per the patient and caregiver.   Overall non toxic in appearance. Answers questions appropriately.   Conditions discussed and addressed this visit include:   Here for follow up  No real improvement in fatigue and general malaise  VSS  Weight stable  Seeing endocrinology coming up  Will check breast MRI with hx   Update mammogram  Check cortisol and adrenal function, protein stores  Continue to work with diet and activity  May benefit from seeing Dr Krishnamurthy  No change to current supplements    1. Hypothyroidism, unspecified type  -     TSH; Future  -     T4; Future  -     ACTH; Future  -     Salivary Cortisol; Future  -     Aldosterone; Future  -     Albumin; Future  2. Lymphadenopathy  -     ASHLEY NUNU DIGITAL DIAGNOSTIC BILATERAL; Future  3. Chronic sinusitis, unspecified location  4. Mammographic fibroglandular density, left breast  -     MRI BREAST BILATERAL WO CONTRAST; Future  -     ASHLEY NUNU DIGITAL DIAGNOSTIC BILATERAL; Future  5. Mammographic fibroglandular density, right breast  -     MRI BREAST BILATERAL WO CONTRAST; Future  -     ASHLEY NUNU DIGITAL DIAGNOSTIC BILATERAL; Future  6. Cyclical breast pain  -     MRI BREAST BILATERAL WO CONTRAST; Future  7. History of adrenal insufficiency  -     TSH; Future  -     T4; Future  -     ACTH; Future  -     Salivary Cortisol; Future  -     Aldosterone; Future  -     Albumin; Future      Return in about 4 weeks (around 3/13/2024).    SUBJECTIVE/OBJECTIVE:  Breast

## 2024-02-15 ENCOUNTER — TELEPHONE (OUTPATIENT)
Dept: FAMILY MEDICINE CLINIC | Age: 79
End: 2024-02-15

## 2024-02-15 ENCOUNTER — NURSE ONLY (OUTPATIENT)
Dept: LAB | Age: 79
End: 2024-02-15

## 2024-02-15 DIAGNOSIS — R92.322 MAMMOGRAPHIC FIBROGLANDULAR DENSITY, LEFT BREAST: ICD-10-CM

## 2024-02-15 DIAGNOSIS — N64.4 CYCLICAL BREAST PAIN: ICD-10-CM

## 2024-02-15 DIAGNOSIS — Z86.39 HISTORY OF ADRENAL INSUFFICIENCY: ICD-10-CM

## 2024-02-15 DIAGNOSIS — R59.1 LYMPHADENOPATHY: Primary | ICD-10-CM

## 2024-02-15 DIAGNOSIS — R92.321 MAMMOGRAPHIC FIBROGLANDULAR DENSITY, RIGHT BREAST: ICD-10-CM

## 2024-02-15 DIAGNOSIS — E03.9 HYPOTHYROIDISM, UNSPECIFIED TYPE: ICD-10-CM

## 2024-02-15 LAB
ALBUMIN SERPL BCG-MCNC: 4 G/DL (ref 3.5–5.1)
TSH SERPL DL<=0.005 MIU/L-ACNC: 1.33 UIU/ML (ref 0.4–4.2)

## 2024-02-15 NOTE — TELEPHONE ENCOUNTER
Ella from [facility]'s Central Scheduling called stating the MRI Breast Bilateral wo Contrast needs to be rewritten to say MRI Breast Bilateral with and without contrast as this is the only way they can schedule.

## 2024-02-16 ENCOUNTER — TELEPHONE (OUTPATIENT)
Dept: FAMILY MEDICINE CLINIC | Age: 79
End: 2024-02-16

## 2024-02-16 DIAGNOSIS — Z86.39 HISTORY OF ADRENAL INSUFFICIENCY: ICD-10-CM

## 2024-02-16 DIAGNOSIS — E03.9 HYPOTHYROIDISM, UNSPECIFIED TYPE: ICD-10-CM

## 2024-02-16 LAB
ACTH PLAS-MCNC: 39.8 PG/ML (ref 7.2–63.3)
T4 SERPL-MCNC: 8.9 UG/DL (ref 4.5–11.7)

## 2024-02-16 NOTE — TELEPHONE ENCOUNTER
----- Message from ADE Daley - CNP sent at 2/16/2024 12:15 PM EST -----  Thyroid is stable. Other labs still  pending.

## 2024-02-17 LAB — ALDOST SERPL-MCNC: 10.5 NG/DL

## 2024-02-19 ENCOUNTER — TELEPHONE (OUTPATIENT)
Dept: FAMILY MEDICINE CLINIC | Age: 79
End: 2024-02-19

## 2024-02-19 NOTE — TELEPHONE ENCOUNTER
----- Message from ADE Daley - CNP sent at 2/19/2024  9:47 AM EST -----  Adrenal labs are wnl at this time.

## 2024-02-22 LAB — CORTIS SAL-MCNC: < 0.025 UG/DL

## 2024-02-23 ENCOUNTER — TELEPHONE (OUTPATIENT)
Dept: FAMILY MEDICINE CLINIC | Age: 79
End: 2024-02-23

## 2024-02-23 NOTE — TELEPHONE ENCOUNTER
I copy and pasted Bertha's response regarding the patient's Salivary Cortisol results and sent them to the patient via My Chart.

## 2024-02-23 NOTE — TELEPHONE ENCOUNTER
----- Message from ADE Daley - CNP sent at 2/23/2024  3:11 PM EST -----  Normal salivary cortisol for mid day readings.

## 2024-03-08 ENCOUNTER — HOSPITAL ENCOUNTER (OUTPATIENT)
Dept: MRI IMAGING | Age: 79
Discharge: HOME | End: 2024-03-08

## 2024-03-08 ENCOUNTER — PATIENT MESSAGE (OUTPATIENT)
Dept: FAMILY MEDICINE CLINIC | Age: 79
End: 2024-03-08

## 2024-03-08 DIAGNOSIS — N64.4 CYCLICAL BREAST PAIN: ICD-10-CM

## 2024-03-08 DIAGNOSIS — R59.1 LYMPHADENOPATHY: Primary | ICD-10-CM

## 2024-03-08 DIAGNOSIS — R92.322 MAMMOGRAPHIC FIBROGLANDULAR DENSITY, LEFT BREAST: ICD-10-CM

## 2024-03-08 DIAGNOSIS — R59.1 LYMPHADENOPATHY: ICD-10-CM

## 2024-03-08 DIAGNOSIS — R92.321 MAMMOGRAPHIC FIBROGLANDULAR DENSITY, RIGHT BREAST: ICD-10-CM

## 2024-03-08 NOTE — TELEPHONE ENCOUNTER
From: Eva Orellana  To: Bertha Viera  Sent: 3/8/2024 2:15 PM EST  Subject: MRI of BREAST    MK------  Coiuld not complete the MRI-----have never had one and I thought I was prepared-----but-----once I was in there-----I just could not do it. Complete anxiouty attack------! So now what? If you feel I still need it, I believe there is one in [place] where they do the breast one in an open chair type situation. So sorry!    Alicia Pereyra  [phone]  [email or web address]

## 2024-03-25 ENCOUNTER — PATIENT MESSAGE (OUTPATIENT)
Dept: FAMILY MEDICINE CLINIC | Age: 79
End: 2024-03-25

## 2024-03-25 DIAGNOSIS — E03.9 HYPOTHYROIDISM, UNSPECIFIED TYPE: ICD-10-CM

## 2024-03-26 RX ORDER — LEVOTHYROXINE SODIUM 150 MCG
150 TABLET ORAL DAILY
Qty: 90 TABLET | Refills: 1 | Status: SHIPPED | OUTPATIENT
Start: 2024-03-26

## 2024-03-26 NOTE — TELEPHONE ENCOUNTER
From: Eva Orellana  To: Bertha Viera  Sent: 3/25/2024 5:42 PM EDT  Subject: New prescription for Synthyroid    MK-----    My prescription for Synthyroid needs refilled. I am currently on .150 mcg. We talked about bumping it up . My hair is thinning, nails are ridged, brain is foggy and my skin is flakie! ...................please send it to [facility] on [address] for now. Please write for SYNTHYROID and not the genaric-----they work much better for me.   Thank you for all you do!  Alicia Orellana  [phone]

## 2024-04-15 ENCOUNTER — OFFICE VISIT (OUTPATIENT)
Dept: ENT CLINIC | Age: 79
End: 2024-04-15
Payer: MEDICARE

## 2024-04-15 VITALS
HEART RATE: 90 BPM | RESPIRATION RATE: 18 BRPM | SYSTOLIC BLOOD PRESSURE: 118 MMHG | WEIGHT: 255.1 LBS | HEIGHT: 67 IN | TEMPERATURE: 97.5 F | DIASTOLIC BLOOD PRESSURE: 66 MMHG | OXYGEN SATURATION: 93 % | BODY MASS INDEX: 40.04 KG/M2

## 2024-04-15 DIAGNOSIS — J34.89 REFRACTORY OBSTRUCTION OF NASAL AIRWAY: ICD-10-CM

## 2024-04-15 DIAGNOSIS — Z98.890 STATUS POST FUNCTIONAL ENDOSCOPIC SINUS SURGERY: ICD-10-CM

## 2024-04-15 DIAGNOSIS — J32.9 CHRONIC RHINOSINUSITIS: Primary | ICD-10-CM

## 2024-04-15 PROCEDURE — 99214 OFFICE O/P EST MOD 30 MIN: CPT | Performed by: OTOLARYNGOLOGY

## 2024-04-15 PROCEDURE — 1123F ACP DISCUSS/DSCN MKR DOCD: CPT | Performed by: OTOLARYNGOLOGY

## 2024-04-15 NOTE — PROGRESS NOTES
[facility] [place] [facility]  [facility] EAR, NOSE AND THROAT  [address]  [address]  [facility] [address]  Dept: [phone]  Dept Fax: [phone]  Loc: [phone]    Eva Orellana is a 78 y.o. female who was referred by No ref. provider found for:  Chief Complaint   Patient presents with    Follow-up     Patient is here for a 10 wk f/u 2nd post op, igs ethmoid, sphenoid, maxillary. Patient states she feels like she is about 80% there. She is doing the rinses daily. She takes 2 sudafed daily. Pt states she has been doing well besides the seasonal allergies but states overall she is doing fairly well.        HPI:     Eva Orellana is a 78 y.o. female status post extensive endonasal pan paranasal sinus surgery for nasal airway obstruction and chronic rhinosinusitis.  The patient reports doing very well and describes herself as being \"80% back\".  She denies mucopurulent drainage and states that she is now irrigating once every 2 or 3 days where she had been irrigating multiple times per day.  She has no new medical problems.     History:     No Known Allergies  Current Outpatient Medications   Medication Sig Dispense Refill    SYNTHROID 150 MCG tablet Take 1 tablet by mouth daily Take with water on an empty stomach- wait 30 minutes before eating or taking other meds. 90 tablet 1    Multiple Vitamin (MULTIVITAMIN PO) Take 1 each by mouth daily Ultralife 121 one tablet daily.      Multiple Vitamins-Minerals (PRESERVISION AREDS PO) Take 2 tablets by mouth daily      OLIVE LEAF PO Take 8 tablets by mouth as needed (for injection)      Lactobacillus (ACIDOPHILUS) 100 MG CAPS Take 1 capsule by mouth daily      triamcinolone (NASACORT) 55 MCG/ACT nasal inhaler 2 sprays by Nasal route daily      vitamin B-12 (CYANOCOBALAMIN) 500 MCG tablet Take 1 tablet by mouth daily Indications: 2500mg a day      Niacin (VITAMIN B-3 PO) Take 1 tablet by mouth daily      Magnesium 500 MG CAPS Take 1 caplet

## 2024-05-06 NOTE — PATIENT INSTRUCTIONS
Peripheral IV    Performed by: Arron Salgado MD  Authorized by: Arron Salgado MD    Size:  24 G  Laterality:  Left  Location:  Hand  Site Prep:  Chlorhexidine gluconate (CHG)  Attempts:  1  Securement: Tape and Transparent dressing       You may receive a survey regarding the care you received during your visit. Your input is valuable to us. We encourage you to complete and return your survey. We hope you will choose us in the future for your healthcare needs.

## 2024-05-07 ENCOUNTER — OFFICE VISIT (OUTPATIENT)
Age: 79
End: 2024-05-07
Payer: MEDICARE

## 2024-05-07 VITALS
BODY MASS INDEX: 39.13 KG/M2 | DIASTOLIC BLOOD PRESSURE: 78 MMHG | HEIGHT: 68 IN | WEIGHT: 258.2 LBS | HEART RATE: 72 BPM | SYSTOLIC BLOOD PRESSURE: 124 MMHG

## 2024-05-07 DIAGNOSIS — E03.9 ACQUIRED HYPOTHYROIDISM: Primary | ICD-10-CM

## 2024-05-07 PROCEDURE — 1123F ACP DISCUSS/DSCN MKR DOCD: CPT | Performed by: INTERNAL MEDICINE

## 2024-05-07 PROCEDURE — 99204 OFFICE O/P NEW MOD 45 MIN: CPT | Performed by: INTERNAL MEDICINE

## 2024-05-07 RX ORDER — PSEUDOEPHEDRINE HCL 30 MG
30 TABLET ORAL EVERY 4 HOURS PRN
COMMUNITY

## 2024-05-07 NOTE — PROGRESS NOTES
Maxillary Sinus performed by Jose A Spann MD at [facility] OR       Family History   Problem Relation Age of Onset    Dementia Mother     Arthritis Mother     Diabetes Father      Social History     Tobacco Use    Smoking status: Former     Current packs/day: 0.00     Average packs/day: 0.8 packs/day for 5.0 years (3.8 ttl pk-yrs)     Types: Cigarettes     Start date: 1991     Quit date: 1996     Years since quittin.0     Passive exposure: Past    Smokeless tobacco: Never    Tobacco comments:     Quit smoking    Substance Use Topics    Alcohol use: Yes     Comment: rarely      Current Outpatient Medications   Medication Sig Dispense Refill    Evening Primrose Oil 1000 MG CAPS Take 3 capsules by mouth daily      pseudoephedrine (SUDAFED) 30 MG tablet Take 1 tablet by mouth every 4 hours as needed      SYNTHROID 150 MCG tablet Take 1 tablet by mouth daily Take with water on an empty stomach- wait 30 minutes before eating or taking other meds. 90 tablet 1    Multiple Vitamin (MULTIVITAMIN PO) Take 1 each by mouth daily Ultralife 121 one tablet daily.      Multiple Vitamins-Minerals (PRESERVISION AREDS PO) Take 2 tablets by mouth daily      OLIVE LEAF PO Take 8 tablets by mouth as needed (for injection)      triamcinolone (NASACORT) 55 MCG/ACT nasal inhaler 2 sprays by Nasal route daily      vitamin B-12 (CYANOCOBALAMIN) 500 MCG tablet Take 1 tablet by mouth daily Indications: 2500mg a day      Niacin (VITAMIN B-3 PO) Take 1 tablet by mouth daily      Magnesium 500 MG CAPS Take 1 caplet by mouth daily 4 kind of magnesium in one pill.      vitamin E 400 UNIT capsule Take 2 capsules by mouth daily      Cholecalciferol (VITAMIN D3) 79967 UNITS CAPS Take 5,000 Units by mouth daily      Lactobacillus (ACIDOPHILUS) 100 MG CAPS Take 1 capsule by mouth daily (Patient not taking: Reported on 2024)       No current facility-administered medications for this visit.     Allergies   Allergen Reactions

## 2024-05-29 ENCOUNTER — NURSE ONLY (OUTPATIENT)
Dept: LAB | Age: 79
End: 2024-05-29

## 2024-05-29 DIAGNOSIS — E03.9 ACQUIRED HYPOTHYROIDISM: ICD-10-CM

## 2024-05-29 LAB
T4 FREE SERPL-MCNC: 1.2 NG/DL (ref 0.93–1.68)
TSH SERPL DL<=0.005 MIU/L-ACNC: 0.62 UIU/ML (ref 0.4–4.2)

## 2024-05-31 LAB
THYROGLOBULIN ANTIBODY: < 12 IU/ML (ref 0–40)
THYROPEROXIDASE AB SERPL IA-ACNC: 10 IU/ML (ref 0–25)

## 2024-06-17 SDOH — HEALTH STABILITY: PHYSICAL HEALTH: ON AVERAGE, HOW MANY MINUTES DO YOU ENGAGE IN EXERCISE AT THIS LEVEL?: 150+ MIN

## 2024-06-17 SDOH — HEALTH STABILITY: PHYSICAL HEALTH: ON AVERAGE, HOW MANY DAYS PER WEEK DO YOU ENGAGE IN MODERATE TO STRENUOUS EXERCISE (LIKE A BRISK WALK)?: 4 DAYS

## 2024-06-18 ENCOUNTER — OFFICE VISIT (OUTPATIENT)
Dept: FAMILY MEDICINE CLINIC | Age: 79
End: 2024-06-18
Payer: MEDICARE

## 2024-06-18 VITALS
BODY MASS INDEX: 39.94 KG/M2 | WEIGHT: 258.8 LBS | HEART RATE: 82 BPM | SYSTOLIC BLOOD PRESSURE: 120 MMHG | DIASTOLIC BLOOD PRESSURE: 78 MMHG | TEMPERATURE: 98.2 F | RESPIRATION RATE: 16 BRPM

## 2024-06-18 DIAGNOSIS — J32.9 CHRONIC SINUSITIS, UNSPECIFIED LOCATION: ICD-10-CM

## 2024-06-18 DIAGNOSIS — E66.01 MORBID OBESITY WITH BMI OF 40.0-44.9, ADULT (HCC): ICD-10-CM

## 2024-06-18 DIAGNOSIS — R73.01 IFG (IMPAIRED FASTING GLUCOSE): ICD-10-CM

## 2024-06-18 DIAGNOSIS — E78.5 HYPERLIPIDEMIA WITH TARGET LDL LESS THAN 100: ICD-10-CM

## 2024-06-18 DIAGNOSIS — E03.9 HYPOTHYROIDISM, UNSPECIFIED TYPE: Primary | ICD-10-CM

## 2024-06-18 DIAGNOSIS — T14.8XXA WOUND OF SKIN: ICD-10-CM

## 2024-06-18 PROCEDURE — 1123F ACP DISCUSS/DSCN MKR DOCD: CPT | Performed by: NURSE PRACTITIONER

## 2024-06-18 PROCEDURE — 99214 OFFICE O/P EST MOD 30 MIN: CPT | Performed by: NURSE PRACTITIONER

## 2024-06-18 RX ORDER — LEVOTHYROXINE SODIUM 150 MCG
150 TABLET ORAL DAILY
Qty: 90 TABLET | Refills: 3 | Status: SHIPPED | OUTPATIENT
Start: 2024-06-18

## 2024-06-18 NOTE — PROGRESS NOTES
Chief Complaint   Patient presents with    New Patient     Pt here for NP appointment, right ankle has an open wound       SUBJECTIVE     Eva Orellana is a 78 y.o.female      Pt presents to establish PCP- previous physician was Dr English and then Bertha Viera.  Date last seen by physician- 2/14/24.      Patient states she prefers more natural therapies versus medications if possible.   Struggles with sinus infections. Has had 2 sinus surgeries in the last year.  She is a patient of Dr Spann. Has struggled with yeast infections due to frequent antibiotics. She is now taking probiotics, apple cider vinegar tablets and this helps quite a bit.    Has seen Dr Almanza once so far. Will see him again in August.     Goes to the [facility] twice weekly. Will go to the pool for an hour and then an hour with the instructor.     Bertha sent her to the [facility] due to lymphadenopathy and mammogram findings. They did not find anything malignant states that they feel issues were related to stress. She gets massages about once weekly. They did not feel she needed to get the MRI. She will go to the [facility] next year for her annual mammogram due to better quality.     Small healing sore on right ankle. Mild redness.    Current medical problems include:  Patient Active Problem List   Diagnosis    Hypothyroidism    Environmental allergies    Hyperlipidemia with target LDL less than 100    Morbid obesity with BMI of 40.0-44.9, adult (HCC)    ARTIS on CPAP    Herminia bullosa    Refractory obstruction of nasal airway    Chronic rhinosinusitis    Seasonal allergic rhinitis due to pollen    Status post functional endoscopic sinus surgery [Z98.890 (ICD-10-CM)]    Scar of nose    IFG (impaired fasting glucose)    Lymphadenopathy    Candida infection, esophageal (HCC)    Hypovitaminosis D    Abnormal CBC measurement    Chronic ethmoidal sinusitis    Chronic sphenoidal sinusitis    Chronic maxillary sinusitis    Obesity, Class III, BMI

## 2024-06-19 ENCOUNTER — OFFICE VISIT (OUTPATIENT)
Dept: CARDIOLOGY CLINIC | Age: 79
End: 2024-06-19
Payer: MEDICARE

## 2024-06-19 VITALS
HEIGHT: 68 IN | BODY MASS INDEX: 39.62 KG/M2 | HEART RATE: 93 BPM | SYSTOLIC BLOOD PRESSURE: 132 MMHG | WEIGHT: 261.4 LBS | DIASTOLIC BLOOD PRESSURE: 66 MMHG

## 2024-06-19 DIAGNOSIS — R06.02 SOB (SHORTNESS OF BREATH): Primary | ICD-10-CM

## 2024-06-19 PROCEDURE — 99213 OFFICE O/P EST LOW 20 MIN: CPT | Performed by: NUCLEAR MEDICINE

## 2024-06-19 PROCEDURE — 1123F ACP DISCUSS/DSCN MKR DOCD: CPT | Performed by: NUCLEAR MEDICINE

## 2024-06-19 PROCEDURE — 93000 ELECTROCARDIOGRAM COMPLETE: CPT | Performed by: NUCLEAR MEDICINE

## 2024-06-19 NOTE — PROGRESS NOTES
34.2 years (18.3 ttl pk-yrs)     Types: Cigarettes     Start date: 4/5/1990     Passive exposure: Past    Smokeless tobacco: Never    Tobacco comments:     Mostly a social smoker   Substance Use Topics    Alcohol use: Yes     Alcohol/week: 1.0 standard drink of alcohol     Types: 1 Glasses of wine per week     Comment: rarely      Current Outpatient Medications   Medication Sig Dispense Refill    SYNTHROID 150 MCG tablet Take 1 tablet by mouth daily Take with water on an empty stomach- wait 30 minutes before eating or taking other meds. 90 tablet 3    mupirocin (BACTROBAN) 2 % ointment Apply topically 3 times daily. 22 g 0    Evening Primrose Oil 1000 MG CAPS Take 3 capsules by mouth daily      pseudoephedrine (SUDAFED) 30 MG tablet Take 1 tablet by mouth every 4 hours as needed      Multiple Vitamin (MULTIVITAMIN PO) Take 1 each by mouth daily Ultralife 121 one tablet daily.      Multiple Vitamins-Minerals (PRESERVISION AREDS PO) Take 2 tablets by mouth daily      OLIVE LEAF PO Take 8 tablets by mouth as needed (for injection)      Lactobacillus (ACIDOPHILUS) 100 MG CAPS Take 1 capsule by mouth daily      triamcinolone (NASACORT) 55 MCG/ACT nasal inhaler 2 sprays by Nasal route daily      vitamin B-12 (CYANOCOBALAMIN) 500 MCG tablet Take 1 tablet by mouth daily Indications: 2500mg a day      Niacin (VITAMIN B-3 PO) Take 1 tablet by mouth daily      Magnesium 500 MG CAPS Take 1 caplet by mouth daily 4 kind of magnesium in one pill.      vitamin E 400 UNIT capsule Take 2 capsules by mouth daily      Cholecalciferol (VITAMIN D3) 19306 UNITS CAPS Take 5,000 Units by mouth daily       No current facility-administered medications for this visit.     Allergies   Allergen Reactions    Seasonal      Health Maintenance   Topic Date Due    Hepatitis C screen  Never done    Respiratory Syncytial Virus (RSV) Pregnant or age 60 yrs+ (1 - 1-dose 60+ series) Never done    COVID-19 Vaccine (4 - 2023-24 season) 09/01/2023    Annual

## 2024-06-21 ENCOUNTER — PATIENT MESSAGE (OUTPATIENT)
Dept: FAMILY MEDICINE CLINIC | Age: 79
End: 2024-06-21

## 2024-06-21 DIAGNOSIS — E03.9 HYPOTHYROIDISM, UNSPECIFIED TYPE: Primary | ICD-10-CM

## 2024-06-21 RX ORDER — LEVOTHYROXINE SODIUM 150 MCG
150 TABLET ORAL DAILY
Qty: 90 TABLET | Refills: 3 | Status: CANCELLED | OUTPATIENT
Start: 2024-06-21

## 2024-06-21 NOTE — TELEPHONE ENCOUNTER
Med pended.  Confirm with her that she wants the medication changed to [facility] and not a local [facility]. CG

## 2024-06-21 NOTE — TELEPHONE ENCOUNTER
From: Eva Orellana  To: Josiejenn Marcum  Sent: 6/21/2024 11:09 AM EDT  Subject: Pharmacy change    Ms Marcum----    Thank you for taking me on as a new patient. I appreciate you approach to health care for the elderly.  I asked for a new prescription for SYNTHYROID .150 MCG. Since then we learned that [facility] is closing and [facility] will become our   pharmacy provider. I also learned that I can save over $ 20 dollars a month if you write the preseciption this way:   SYNTHYROID .150 MCG ------DISPENCE AS WRITTEN----CODE #5.    There is no hurry to do this!    Alicia Pereyra  [phone]

## 2024-06-25 ASSESSMENT — ENCOUNTER SYMPTOMS
DIARRHEA: 0
SHORTNESS OF BREATH: 0
VOMITING: 0
CONSTIPATION: 0
BLOOD IN STOOL: 0
NAUSEA: 0

## 2024-07-18 ENCOUNTER — OFFICE VISIT (OUTPATIENT)
Dept: FAMILY MEDICINE CLINIC | Age: 79
End: 2024-07-18

## 2024-07-18 VITALS
SYSTOLIC BLOOD PRESSURE: 126 MMHG | TEMPERATURE: 97.8 F | WEIGHT: 259.8 LBS | HEART RATE: 72 BPM | BODY MASS INDEX: 40.09 KG/M2 | DIASTOLIC BLOOD PRESSURE: 70 MMHG | RESPIRATION RATE: 16 BRPM

## 2024-07-18 DIAGNOSIS — L02.212 ABSCESS OF BACK: Primary | ICD-10-CM

## 2024-07-18 RX ORDER — SULFAMETHOXAZOLE AND TRIMETHOPRIM 800; 160 MG/1; MG/1
1 TABLET ORAL 2 TIMES DAILY
Qty: 20 TABLET | Refills: 0 | Status: SHIPPED | OUTPATIENT
Start: 2024-07-18 | End: 2024-07-21 | Stop reason: ALTCHOICE

## 2024-07-18 RX ORDER — FLUCONAZOLE 150 MG/1
150 TABLET ORAL
Qty: 2 TABLET | Refills: 0 | Status: SHIPPED | OUTPATIENT
Start: 2024-07-18 | End: 2024-07-21 | Stop reason: ALTCHOICE

## 2024-07-18 NOTE — PROGRESS NOTES
Chief Complaint   Patient presents with    Skin Problem     Pt c/o painful cyst on the middle of her upper back. Pt has had it for quite sometime. Pt has a band-aid on the area, but it is red and inflamed plus causes a lot of pain.          SUBJECTIVE     Eva Orellana is a 79 y.o.female      Pt complains of wound to the mid upper back. She has had an abscess in that area in the past and was going to get antibiotics. 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 She does see dermatology and has an appt coming up.     Review of Systems   Constitutional:  Negative for fever.   Skin:  Positive for wound (upper back).         OBJECTIVE     /70   Pulse 72   Temp 97.8 °F (36.6 °C) (Oral)   Resp 16   Wt 117.8 kg (259 lb 12.8 oz)   LMP 02/15/1995   BMI 40.09 kg/m²     Physical Exam  Vitals and nursing note reviewed.   Constitutional:       Appearance: She is well-developed.   HENT:      Head: Normocephalic and atraumatic.      Right Ear: External ear normal.      Left Ear: External ear normal.      Nose: Nose normal.   Eyes:      Conjunctiva/sclera: Conjunctivae normal.      Pupils: Pupils are equal, round, and reactive to light.   Cardiovascular:      Rate and Rhythm: Normal rate and regular rhythm.      Heart sounds: Normal heart sounds.   Pulmonary:      Effort: Pulmonary effort is normal.      Breath sounds: Normal breath sounds.   Musculoskeletal:         General: Normal range of motion.      Cervical back: Normal range of motion and neck supple.   Skin:     General: Skin is warm and dry.      Findings: Abscess (approx 2in*1.5 in abcess with purulent drainage. Moderate erythema and warmth) present.   Neurological:      Mental Status: She is alert and oriented to person, place, and time.      Deep Tendon Reflexes: Reflexes are normal and symmetric.   Psychiatric:         Behavior: Behavior normal.         Thought Content: Thought content normal.

## 2024-07-20 LAB
BACTERIA SPEC AEROBE CULT: NORMAL
GRAM STN SPEC: NORMAL

## 2024-07-21 ENCOUNTER — HOSPITAL ENCOUNTER (EMERGENCY)
Age: 79
Discharge: HOME | End: 2024-07-21
Payer: MEDICARE

## 2024-07-21 VITALS
TEMPERATURE: 97.8 F | RESPIRATION RATE: 27 BRPM | DIASTOLIC BLOOD PRESSURE: 63 MMHG | HEART RATE: 88 BPM | OXYGEN SATURATION: 94 % | SYSTOLIC BLOOD PRESSURE: 131 MMHG

## 2024-07-21 DIAGNOSIS — L02.91 ABSCESS: ICD-10-CM

## 2024-07-21 DIAGNOSIS — L72.3 SEBACEOUS CYST: Primary | ICD-10-CM

## 2024-07-21 LAB
ALBUMIN SERPL BCG-MCNC: 3.9 G/DL (ref 3.5–5.1)
ALP SERPL-CCNC: 59 U/L (ref 38–126)
ALT SERPL W/O P-5'-P-CCNC: 10 U/L (ref 11–66)
ANION GAP SERPL CALC-SCNC: 11 MEQ/L (ref 8–16)
AST SERPL-CCNC: 18 U/L (ref 5–40)
BASOPHILS ABSOLUTE: 0.1 THOU/MM3 (ref 0–0.1)
BASOPHILS NFR BLD AUTO: 0.8 %
BILIRUB SERPL-MCNC: 0.3 MG/DL (ref 0.3–1.2)
BUN SERPL-MCNC: 11 MG/DL (ref 7–22)
CALCIUM SERPL-MCNC: 8.9 MG/DL (ref 8.5–10.5)
CHLORIDE SERPL-SCNC: 103 MEQ/L (ref 98–111)
CO2 SERPL-SCNC: 25 MEQ/L (ref 23–33)
CREAT SERPL-MCNC: 0.8 MG/DL (ref 0.4–1.2)
DEPRECATED RDW RBC AUTO: 46.8 FL (ref 35–45)
EOSINOPHIL NFR BLD AUTO: 2.1 %
EOSINOPHILS ABSOLUTE: 0.2 THOU/MM3 (ref 0–0.4)
ERYTHROCYTE [DISTWIDTH] IN BLOOD BY AUTOMATED COUNT: 13.4 % (ref 11.5–14.5)
GFR SERPL CREATININE-BSD FRML MDRD: 75 ML/MIN/1.73M2
GLUCOSE SERPL-MCNC: 150 MG/DL (ref 70–108)
HCT VFR BLD AUTO: 42.2 % (ref 37–47)
HGB BLD-MCNC: 14.1 GM/DL (ref 12–16)
IMM GRANULOCYTES # BLD AUTO: 0.03 THOU/MM3 (ref 0–0.07)
IMM GRANULOCYTES NFR BLD AUTO: 0.3 %
LACTIC ACID, SEPSIS: 1.7 MMOL/L (ref 0.5–1.9)
LACTIC ACID, SEPSIS: 1.9 MMOL/L (ref 0.5–1.9)
LYMPHOCYTES ABSOLUTE: 2.2 THOU/MM3 (ref 1–4.8)
LYMPHOCYTES NFR BLD AUTO: 23.2 %
MCH RBC QN AUTO: 31.7 PG (ref 26–33)
MCHC RBC AUTO-ENTMCNC: 33.4 GM/DL (ref 32.2–35.5)
MCV RBC AUTO: 94.8 FL (ref 81–99)
MONOCYTES ABSOLUTE: 0.5 THOU/MM3 (ref 0.4–1.3)
MONOCYTES NFR BLD AUTO: 5.7 %
NEUTROPHILS ABSOLUTE: 6.5 THOU/MM3 (ref 1.8–7.7)
NEUTROPHILS NFR BLD AUTO: 67.9 %
NRBC BLD AUTO-RTO: 0 /100 WBC
OSMOLALITY SERPL CALC.SUM OF ELEC: 279.8 MOSMOL/KG (ref 275–300)
PLATELET # BLD AUTO: 252 THOU/MM3 (ref 130–400)
PMV BLD AUTO: 10.7 FL (ref 9.4–12.4)
POTASSIUM SERPL-SCNC: 4.1 MEQ/L (ref 3.5–5.2)
PROT SERPL-MCNC: 6.8 G/DL (ref 6.1–8)
RBC # BLD AUTO: 4.45 MILL/MM3 (ref 4.2–5.4)
SODIUM SERPL-SCNC: 139 MEQ/L (ref 135–145)
WBC # BLD AUTO: 9.5 THOU/MM3 (ref 4.8–10.8)

## 2024-07-21 PROCEDURE — 80053 COMPREHEN METABOLIC PANEL: CPT

## 2024-07-21 PROCEDURE — 83605 ASSAY OF LACTIC ACID: CPT

## 2024-07-21 PROCEDURE — 2500000003 HC RX 250 WO HCPCS: Performed by: PHYSICIAN ASSISTANT

## 2024-07-21 PROCEDURE — 36415 COLL VENOUS BLD VENIPUNCTURE: CPT

## 2024-07-21 PROCEDURE — 85025 COMPLETE CBC W/AUTO DIFF WBC: CPT

## 2024-07-21 PROCEDURE — 10060 I&D ABSCESS SIMPLE/SINGLE: CPT

## 2024-07-21 PROCEDURE — 99283 EMERGENCY DEPT VISIT LOW MDM: CPT

## 2024-07-21 RX ORDER — FLUCONAZOLE 150 MG/1
150 TABLET ORAL ONCE
Qty: 1 TABLET | Refills: 0 | Status: SHIPPED | OUTPATIENT
Start: 2024-07-21 | End: 2024-07-21

## 2024-07-21 RX ORDER — SULFAMETHOXAZOLE AND TRIMETHOPRIM 800; 160 MG/1; MG/1
1 TABLET ORAL 2 TIMES DAILY
Qty: 20 TABLET | Refills: 0 | Status: SHIPPED | OUTPATIENT
Start: 2024-07-21 | End: 2024-07-31

## 2024-07-21 RX ORDER — LIDOCAINE HYDROCHLORIDE 10 MG/ML
10 INJECTION, SOLUTION EPIDURAL; INFILTRATION; INTRACAUDAL; PERINEURAL ONCE
Status: COMPLETED | OUTPATIENT
Start: 2024-07-21 | End: 2024-07-21

## 2024-07-21 RX ADMIN — LIDOCAINE HYDROCHLORIDE 10 ML: 10 INJECTION, SOLUTION EPIDURAL; INFILTRATION; INTRACAUDAL; PERINEURAL at 12:58

## 2024-07-21 NOTE — ED TRIAGE NOTES
Pt to the ED via personal  transport. Pt presents with complaints of infected cyst on back . Patient states that symptoms began last week but was prescribed bactrim but thinks its ineffective. IV access obtained. Telemetry applied. Patient is alert and oriented x 4. Respirations are regular and unlabored. Patient provided blanket.  Call light within reach.

## 2024-07-21 NOTE — ED PROVIDER NOTES
[facility] EMERGENCY DEPT      EMERGENCY MEDICINE     Pt Name: Eva Orellana  MRN: [id number]  Birthdate 1945  Date of evaluation: 7/21/2024  Provider: Edgar Mejia PA-C,   CHIEF COMPLAINT       Chief Complaint   Patient presents with    Wound Infection     HISTORY OF PRESENT ILLNESS   Eva Orellana is a pleasant 79 y.o. female who presents to the emergency department for evaluation of wound infection. Patient complains of cyst between shoulder blades. She previously saw her PCP for this, who prescribed Bactrim and cultured the wound, however it was not opened.  She feels that her wound is worsening. She also notes a rash her chest, but nowhere else on her body.  She states it is itchy she is unsure what is that is from.  She does not have any other signs for allergic reaction.  She has an appointment on August 20 with dermatology to excise her cyst. Her  notes that she has a chronic cyst, but that it is usually superior to the one she has now. She denies fevers.      PASTMEDICAL HISTORY/Co-Morbid Conditions:     Past Medical History:   Diagnosis Date    Allergic rhinitis     Arthritis     Chronic kidney disease     pt doens't know why this is in there    Claustrophobia 02/24/2016    Headache     Hyperlipidemia     Obesity     ARTIS on CPAP     uses cpap    ARTIS on CPAP 10/18/2017    Thyroid disease        Patient Active Problem List   Diagnosis Code    Hypothyroidism E03.9    Environmental allergies Z91.09    Hyperlipidemia with target LDL less than 100 E78.5    Morbid obesity with BMI of 40.0-44.9, adult (HCC) E66.01, Z68.41    ARTIS on CPAP G47.33    Herminia bullosa J34.89    Refractory obstruction of nasal airway J34.89    Chronic rhinosinusitis J32.9    Seasonal allergic rhinitis due to pollen J30.1    Status post functional endoscopic sinus surgery [Z98.890 (ICD-10-CM)] Z98.890    Scar of nose L90.5    IFG (impaired fasting glucose) R73.01    Lymphadenopathy R59.1    Candida infection,

## 2024-07-29 ENCOUNTER — OFFICE VISIT (OUTPATIENT)
Dept: FAMILY MEDICINE CLINIC | Age: 79
End: 2024-07-29
Payer: MEDICARE

## 2024-07-29 VITALS
WEIGHT: 258 LBS | HEART RATE: 70 BPM | RESPIRATION RATE: 16 BRPM | BODY MASS INDEX: 39.81 KG/M2 | DIASTOLIC BLOOD PRESSURE: 64 MMHG | SYSTOLIC BLOOD PRESSURE: 110 MMHG | TEMPERATURE: 98 F

## 2024-07-29 DIAGNOSIS — L02.212 ABSCESS OF BACK: Primary | ICD-10-CM

## 2024-07-29 PROCEDURE — 99213 OFFICE O/P EST LOW 20 MIN: CPT | Performed by: NURSE PRACTITIONER

## 2024-07-29 PROCEDURE — 1123F ACP DISCUSS/DSCN MKR DOCD: CPT | Performed by: NURSE PRACTITIONER

## 2024-07-29 ASSESSMENT — ENCOUNTER SYMPTOMS
DIARRHEA: 0
CONSTIPATION: 0
BLOOD IN STOOL: 0
SHORTNESS OF BREATH: 0
VOMITING: 0
NAUSEA: 0

## 2024-07-29 NOTE — PROGRESS NOTES
Chief Complaint   Patient presents with    Follow-up     Pt here for ED f/u, had cyst drained between shoulder blade     SUBJECTIVE     Eva Orellana is a 79 y.o.female      Pt presents for ED follow up. Had her back abscess lanced and drained. She did finish antibiotic from this provider but did not take the additional 10 days of bactrim prescribed by ED. She has been having lots of issues with vaginal yeast infections. Seeing GYN for candida vaginally and vaginal cysts as well. Will see dermatology soon.    Review of Systems   Constitutional:  Negative for chills, diaphoresis and fever.   Respiratory:  Negative for shortness of breath.    Cardiovascular:  Negative for chest pain, palpitations and leg swelling.   Gastrointestinal:  Negative for blood in stool, constipation, diarrhea, nausea and vomiting.   Genitourinary:  Negative for dysuria and hematuria.   Musculoskeletal:  Negative for myalgias.   Skin:  Positive for wound (between shoulder blades).   Neurological:  Negative for dizziness and headaches.   All other systems reviewed and are negative.        OBJECTIVE     /64 (Site: Left Upper Arm, Position: Sitting)   Pulse 70   Temp 98 °F (36.7 °C)   Resp 16   Wt 117 kg (258 lb)   LMP 02/15/1995   BMI 39.81 kg/m²     Physical Exam  Vitals and nursing note reviewed.   Constitutional:       Appearance: She is well-developed.   HENT:      Head: Normocephalic and atraumatic.   Musculoskeletal:         General: Normal range of motion.      Cervical back: Normal range of motion and neck supple.   Skin:     General: Skin is warm and dry.      Findings: Wound present.          Neurological:      Mental Status: She is alert and oriented to person, place, and time.      Deep Tendon Reflexes: Reflexes are normal and symmetric.   Psychiatric:         Behavior: Behavior normal.         Thought Content: Thought content normal.         Judgment: Judgment normal.         No results found for this visit on

## 2024-08-07 ENCOUNTER — OFFICE VISIT (OUTPATIENT)
Age: 79
End: 2024-08-07
Payer: MEDICARE

## 2024-08-07 VITALS
HEIGHT: 68 IN | HEART RATE: 81 BPM | WEIGHT: 260 LBS | DIASTOLIC BLOOD PRESSURE: 72 MMHG | SYSTOLIC BLOOD PRESSURE: 128 MMHG | BODY MASS INDEX: 39.4 KG/M2

## 2024-08-07 DIAGNOSIS — E03.9 ACQUIRED HYPOTHYROIDISM: Primary | ICD-10-CM

## 2024-08-07 PROCEDURE — 1123F ACP DISCUSS/DSCN MKR DOCD: CPT | Performed by: INTERNAL MEDICINE

## 2024-08-07 PROCEDURE — 99213 OFFICE O/P EST LOW 20 MIN: CPT | Performed by: INTERNAL MEDICINE

## 2024-08-07 NOTE — PROGRESS NOTES
bruit, no JVD and supple, symmetrical, trachea midline  Thyroid: There is no goiter or thyroid tenderness.  Lung:clear to auscultation bilaterally  Heart: regular rate and rhythm, S1, S2 normal, no murmur, click, rub or gallop and normal apical impulse  Abdomen:soft, non-tender; bowel sounds normal; no masses,  no organomegaly  Extremities:extremities normal, atraumatic, no cyanosis or edema and Homans sign is negative, no sign of DVT  Pulses:2+ and symmetric  Skin:warm and dry, no hyperpigmentation, vitiligo, or suspicious lesions  Neuro:normal without focal findings, mental status, speech normal, alert and oriented x3, EDUARDO, cranial nerves 2-12 intact, muscle tone and strength normal and symmetric.  Lymph nodes: There is no cervical, supraclavicular or submental adenopathy.  Musculoskeletal:  No joint swelling or deformity.  Psychiatry: Alert and oriented ×3.  Making good eye contact.  Affect is normal.        Assessment/Plan:   Diagnosis Orders   1. Acquired hypothyroidism  CONTROLLED Clinically doing well on current regiment. Will continue to monitor levels..                Orders Placed This Encounter   Procedures    TSH     Standing Status:   Future     Standing Expiration Date:   2/7/2025    T4, Free     Standing Status:   Future     Standing Expiration Date:   2/7/2025           The risks and benefits of my recommendations, as well as other treatment options were discussed with the patient today. Questions were answered.  Follow up: 6 months and as needed.         Electronically signed by Manny Almanza MD 8/7/2024 3:30 PM     **This report has been created using voice recognition software. It may   contain minor errors which are inherent in voice recognition technology.**

## 2024-10-01 ENCOUNTER — PATIENT MESSAGE (OUTPATIENT)
Dept: FAMILY MEDICINE CLINIC | Age: 79
End: 2024-10-01

## 2024-10-02 RX ORDER — MUPIROCIN 20 MG/G
OINTMENT TOPICAL
Qty: 22 G | Refills: 0 | Status: SHIPPED | OUTPATIENT
Start: 2024-10-02 | End: 2024-10-09

## 2024-10-09 ENCOUNTER — TELEPHONE (OUTPATIENT)
Dept: ENT CLINIC | Age: 79
End: 2024-10-09

## 2024-10-09 DIAGNOSIS — J30.2 SEASONAL ALLERGIES: Primary | ICD-10-CM

## 2024-10-09 DIAGNOSIS — J32.9 CHRONIC RHINOSINUSITIS: ICD-10-CM

## 2024-10-09 NOTE — TELEPHONE ENCOUNTER
I called to reschedule patient. She asked if she could get a referral for [facility] allergy. She thought that maybe some of her sinus issues where allergy related. Especially this time of year.

## 2024-10-15 ENCOUNTER — HOSPITAL ENCOUNTER (OUTPATIENT)
Dept: CT IMAGING | Age: 79
Discharge: HOME | End: 2024-10-15
Attending: OTOLARYNGOLOGY
Payer: MEDICARE

## 2024-10-15 DIAGNOSIS — Z98.890 STATUS POST FUNCTIONAL ENDOSCOPIC SINUS SURGERY: ICD-10-CM

## 2024-10-15 DIAGNOSIS — J32.9 CHRONIC RHINOSINUSITIS: ICD-10-CM

## 2024-10-15 PROCEDURE — 70486 CT MAXILLOFACIAL W/O DYE: CPT

## 2024-11-11 ENCOUNTER — OFFICE VISIT (OUTPATIENT)
Dept: ALLERGY | Age: 79
End: 2024-11-11
Payer: MEDICARE

## 2024-11-11 VITALS
OXYGEN SATURATION: 94 % | HEART RATE: 89 BPM | SYSTOLIC BLOOD PRESSURE: 129 MMHG | RESPIRATION RATE: 18 BRPM | DIASTOLIC BLOOD PRESSURE: 62 MMHG | WEIGHT: 160.4 LBS | HEIGHT: 68 IN | BODY MASS INDEX: 24.31 KG/M2

## 2024-11-11 DIAGNOSIS — M35.00 SICCA, UNSPECIFIED TYPE (HCC): ICD-10-CM

## 2024-11-11 DIAGNOSIS — R21 SKIN RASH IN PELVIC REGION: ICD-10-CM

## 2024-11-11 DIAGNOSIS — R73.09 ELEVATED GLUCOSE: ICD-10-CM

## 2024-11-11 DIAGNOSIS — K44.9 HIATAL HERNIA: ICD-10-CM

## 2024-11-11 DIAGNOSIS — J30.89 NON-SEASONAL ALLERGIC RHINITIS, UNSPECIFIED TRIGGER: Primary | ICD-10-CM

## 2024-11-11 DIAGNOSIS — R53.82 CHRONIC FATIGUE: ICD-10-CM

## 2024-11-11 DIAGNOSIS — L29.2 PRURITUS VULVAE: ICD-10-CM

## 2024-11-11 DIAGNOSIS — R59.0 AXILLARY ADENOPATHY: ICD-10-CM

## 2024-11-11 DIAGNOSIS — R21 BUTTERFLY RASH: ICD-10-CM

## 2024-11-11 DIAGNOSIS — K76.89 LIVER CYST: ICD-10-CM

## 2024-11-11 DIAGNOSIS — E55.9 VITAMIN D DEFICIENCY: ICD-10-CM

## 2024-11-11 DIAGNOSIS — I70.0 ABDOMINAL AORTIC ATHEROSCLEROSIS (HCC): ICD-10-CM

## 2024-11-11 DIAGNOSIS — M51.35 DDD (DEGENERATIVE DISC DISEASE), THORACOLUMBAR: ICD-10-CM

## 2024-11-11 DIAGNOSIS — K57.90 DIVERTICULOSIS: ICD-10-CM

## 2024-11-11 PROCEDURE — 1159F MED LIST DOCD IN RCRD: CPT | Performed by: NURSE PRACTITIONER

## 2024-11-11 PROCEDURE — 99215 OFFICE O/P EST HI 40 MIN: CPT | Performed by: NURSE PRACTITIONER

## 2024-11-11 PROCEDURE — 1123F ACP DISCUSS/DSCN MKR DOCD: CPT | Performed by: NURSE PRACTITIONER

## 2024-11-11 RX ORDER — MINOCYCLINE HYDROCHLORIDE 100 MG/1
1 CAPSULE ORAL DAILY
COMMUNITY
Start: 2024-10-23

## 2024-11-11 RX ORDER — LORATADINE 10 MG/1
10 CAPSULE, LIQUID FILLED ORAL DAILY
COMMUNITY
End: 2024-11-11

## 2024-11-11 RX ORDER — FLUCONAZOLE 200 MG/1
1 TABLET ORAL WEEKLY
COMMUNITY
Start: 2024-10-23

## 2024-11-11 RX ORDER — MONTELUKAST SODIUM 10 MG/1
10 TABLET ORAL NIGHTLY
Qty: 90 TABLET | Refills: 0 | Status: SHIPPED | OUTPATIENT
Start: 2024-11-11

## 2024-11-11 RX ORDER — BENZOYL PEROXIDE 100 MG/ML
LIQUID TOPICAL PRN
COMMUNITY
Start: 2024-08-28

## 2024-11-11 RX ORDER — CLOBETASOL PROPIONATE 0.5 MG/ML
SOLUTION TOPICAL PRN
COMMUNITY
Start: 2024-10-01

## 2024-11-11 RX ORDER — SPIRONOLACTONE 50 MG/1
1 TABLET, FILM COATED ORAL DAILY
COMMUNITY
Start: 2024-10-23

## 2024-11-11 RX ORDER — CETIRIZINE HYDROCHLORIDE 10 MG/1
10 TABLET ORAL NIGHTLY
COMMUNITY
Start: 2024-11-11

## 2024-11-11 ASSESSMENT — ENCOUNTER SYMPTOMS
SINUS PRESSURE: 1
SINUS PAIN: 1
RHINORRHEA: 1

## 2024-11-11 NOTE — PROGRESS NOTES
MCG tablet  --  --     Associated Diagnoses:  --     vitamin E 400 UNIT capsule  --  --     Associated Diagnoses:  --               All diagnostic imaging  have been personally reviewed     Plan:  Follow Up: 4 weeks    Patient to return for allergy testing    Patient to get labs.  Since I am combining some her primary care labs with mild labs we will have recommended for her to get these labs fasting as they overlap quite a bit.  Patient does complain of a lot of fatigue.  In addition she had an elevated glucose or how her CMP is 150.  Will make sure diabetes is not also what is contributing to her        GET LABS COMPLETED ASAP PLEASE AND AT LEAST 2 WEEKS PRIOR TO ALLERGY TESTING. LABS ARE NON-FASTING.    PATIENT TO RETURN FOR ALLERGY TESTING    STOP THE FOLLOWING MEDICATIONS (IF TAKING) ONE WEEK PRIOR TO ALLERGY TESTIN. ANTIHISTAMINES - ZYRTEC (CETIRIZINE), CLARITIN (LORATADINE), XYZAL (LEVOCETIRIZINE), BENADRYL (DIPHENHYDRAMINE), HYDROXYZINE, VISTARIL, ALLEGRA (FEXOFENADINE), DOXYALAMINE  2. STOP NASAL SPRAYS/RINSES - NASOCORT, FLONASE, NASONEX, ASTELIN, ANTIVERT (MECLIZINE),BUDESONIDE, XHANCE  3. STOP STOMACH MEDICATIONS - PEPCID, FAMOTIDINE, ZANTAC, RANITIDINE  4. STOP SINGULAIR (MONTELUKAST)  5. IF POSSIBLE HOLD MAINTENANCE INHALERS THE DAY OF TESTING BUT BRING WITH YOU TO USE AFTER THE ALLERGY TESTING. YOU CAN USE YOUR RESCUE INHALER - ALBUTEROL AS DIRECTED. IF YOU HAVE A HARD TIME BREATHING PLEASE USE ALL INHALERS AS DIRECTED.  WE DO NOT WANT YOU TO HAVE DIFFICULTY BREATHING!  6. ELAVIL (AMITRIPTYLINE) - MUST DISCUSS WITH HAYDEE BALL OR PCP PRIOR TO HOLDING    FOLLOWING ALLERGY TESTING YOU MAY RESUME YOUR ALLERGY AND/OR MAINTENANCE ASTHMA MEDICATIONS    IF YOU ARE PLACED ON ANY NEW MEDICATIONS BETWEEN NOW AND THE TIME OF YOUR ALLERGY TESTING BY ANY OTHER PROVIDER CALL THE OFFICE TO VERIFY IF THIS WILL INTERFERE WITH ALLERGY TESTING    PLEASE SHOWER THE MORNING OF ALLERGY TESTING.  THIS IS VERY

## 2024-11-11 NOTE — PATIENT INSTRUCTIONS
GET LABS COMPLETED ASAP PLEASE AND AT LEAST 2 WEEKS PRIOR TO ALLERGY TESTING. LABS ARE NON-FASTING.    PATIENT TO RETURN FOR ALLERGY TESTING    STOP THE FOLLOWING MEDICATIONS (IF TAKING) ONE WEEK PRIOR TO ALLERGY TESTIN. ANTIHISTAMINES - ZYRTEC (CETIRIZINE), CLARITIN (LORATADINE), XYZAL (LEVOCETIRIZINE), BENADRYL (DIPHENHYDRAMINE), HYDROXYZINE, VISTARIL, ALLEGRA (FEXOFENADINE), DOXYALAMINE  2. STOP NASAL SPRAYS/RINSES - NASOCORT, FLONASE, NASONEX, ASTELIN, ANTIVERT (MECLIZINE),BUDESONIDE, XHANCE  3. STOP STOMACH MEDICATIONS - PEPCID, FAMOTIDINE, ZANTAC, RANITIDINE  4. STOP SINGULAIR (MONTELUKAST)  5. IF POSSIBLE HOLD MAINTENANCE INHALERS THE DAY OF TESTING BUT BRING WITH YOU TO USE AFTER THE ALLERGY TESTING. YOU CAN USE YOUR RESCUE INHALER - ALBUTEROL AS DIRECTED. IF YOU HAVE A HARD TIME BREATHING PLEASE USE ALL INHALERS AS DIRECTED.  WE DO NOT WANT YOU TO HAVE DIFFICULTY BREATHING!  6. ELAVIL (AMITRIPTYLINE) - MUST DISCUSS WITH HAYDEE BALL OR PCP PRIOR TO HOLDING    FOLLOWING ALLERGY TESTING YOU MAY RESUME YOUR ALLERGY AND/OR MAINTENANCE ASTHMA MEDICATIONS    IF YOU ARE PLACED ON ANY NEW MEDICATIONS BETWEEN NOW AND THE TIME OF YOUR ALLERGY TESTING BY ANY OTHER PROVIDER CALL THE OFFICE TO VERIFY IF THIS WILL INTERFERE WITH ALLERGY TESTING    PLEASE SHOWER THE MORNING OF ALLERGY TESTING.  THIS IS VERY IMPORTANT FOR TESTING AND INFECTION CONTROL PRIOR TO SKIN ALLERGY TESTING    IF ANY LABS, RADIOLOGY EXMAS, OR PULMONARY TESTS ARE ORDERED, YOU WILL BE NOTIFIED OF ANY ABNORMAL RESULTS IF WE ARE CONCERED.  OTHERWISE LABS WILL BE REVIEWED AT YOUR NEXT VISIT.    How To Prepare For Your Allergy Test    As you embark on your allergy testing, there are several important things to remember.  Your testing process may take 60-90 minutes, but will result in information on which allergens you react to and how severely you react to each one.  Be sure and inform your provider and staff if you are not feeling well or have

## 2024-11-12 ENCOUNTER — LAB (OUTPATIENT)
Dept: LAB | Age: 79
End: 2024-11-12

## 2024-11-12 DIAGNOSIS — R73.09 ELEVATED GLUCOSE: ICD-10-CM

## 2024-11-12 DIAGNOSIS — L29.2 PRURITUS VULVAE: ICD-10-CM

## 2024-11-12 DIAGNOSIS — I70.0 ABDOMINAL AORTIC ATHEROSCLEROSIS (HCC): ICD-10-CM

## 2024-11-12 DIAGNOSIS — J30.89 NON-SEASONAL ALLERGIC RHINITIS, UNSPECIFIED TRIGGER: ICD-10-CM

## 2024-11-12 DIAGNOSIS — R21 SKIN RASH IN PELVIC REGION: ICD-10-CM

## 2024-11-12 DIAGNOSIS — R53.82 CHRONIC FATIGUE: ICD-10-CM

## 2024-11-12 DIAGNOSIS — M35.00 SICCA, UNSPECIFIED TYPE (HCC): ICD-10-CM

## 2024-11-12 DIAGNOSIS — E55.9 VITAMIN D DEFICIENCY: ICD-10-CM

## 2024-11-12 LAB
25(OH)D3 SERPL-MCNC: 50 NG/ML (ref 30–100)
ALBUMIN SERPL BCG-MCNC: 3.9 G/DL (ref 3.5–5.1)
ALP SERPL-CCNC: 56 U/L (ref 38–126)
ALT SERPL W/O P-5'-P-CCNC: 15 U/L (ref 11–66)
ANION GAP SERPL CALC-SCNC: 10 MEQ/L (ref 8–16)
AST SERPL-CCNC: 20 U/L (ref 5–40)
BASOPHILS ABSOLUTE: 0.1 THOU/MM3 (ref 0–0.1)
BASOPHILS NFR BLD AUTO: 1.2 %
BILIRUB SERPL-MCNC: 0.4 MG/DL (ref 0.3–1.2)
BUN SERPL-MCNC: 13 MG/DL (ref 7–22)
CALCIUM SERPL-MCNC: 9.5 MG/DL (ref 8.5–10.5)
CHLORIDE SERPL-SCNC: 105 MEQ/L (ref 98–111)
CHOLEST SERPL-MCNC: 186 MG/DL (ref 100–199)
CO2 SERPL-SCNC: 25 MEQ/L (ref 23–33)
CREAT SERPL-MCNC: 0.6 MG/DL (ref 0.4–1.2)
DEPRECATED RDW RBC AUTO: 46.8 FL (ref 35–45)
EOSINOPHIL NFR BLD AUTO: 2.5 %
EOSINOPHILS ABSOLUTE: 0.2 THOU/MM3 (ref 0–0.4)
ERYTHROCYTE [DISTWIDTH] IN BLOOD BY AUTOMATED COUNT: 13.2 % (ref 11.5–14.5)
FERRITIN SERPL IA-MCNC: 79 NG/ML (ref 10–291)
FOLATE SERPL-MCNC: 8.7 NG/ML (ref 4.8–24.2)
GFR SERPL CREATININE-BSD FRML MDRD: > 90 ML/MIN/1.73M2
GLUCOSE SERPL-MCNC: 103 MG/DL (ref 70–108)
HCT VFR BLD AUTO: 44.1 % (ref 37–47)
HDLC SERPL-MCNC: 50 MG/DL
HGB BLD-MCNC: 14.5 GM/DL (ref 12–16)
IGA SERPL-MCNC: 111 MG/DL (ref 70–400)
IGG SERPL-MCNC: 1190 MG/DL (ref 700–1600)
IGM SERPL-MCNC: 74 MG/DL (ref 40–230)
IMM GRANULOCYTES # BLD AUTO: 0.01 THOU/MM3 (ref 0–0.07)
IMM GRANULOCYTES NFR BLD AUTO: 0.2 %
IRON SERPL-MCNC: 98 UG/DL (ref 50–170)
LDLC SERPL CALC-MCNC: 111 MG/DL
LYMPHOCYTES ABSOLUTE: 1.8 THOU/MM3 (ref 1–4.8)
LYMPHOCYTES NFR BLD AUTO: 30.3 %
MCH RBC QN AUTO: 31.5 PG (ref 26–33)
MCHC RBC AUTO-ENTMCNC: 32.9 GM/DL (ref 32.2–35.5)
MCV RBC AUTO: 95.9 FL (ref 81–99)
MONOCYTES ABSOLUTE: 0.5 THOU/MM3 (ref 0.4–1.3)
MONOCYTES NFR BLD AUTO: 9.1 %
NEUTROPHILS ABSOLUTE: 3.4 THOU/MM3 (ref 1.8–7.7)
NEUTROPHILS NFR BLD AUTO: 56.7 %
NRBC BLD AUTO-RTO: 0 /100 WBC
PLATELET # BLD AUTO: 224 THOU/MM3 (ref 130–400)
PMV BLD AUTO: 11.3 FL (ref 9.4–12.4)
POTASSIUM SERPL-SCNC: 4.3 MEQ/L (ref 3.5–5.2)
PROT SERPL-MCNC: 7.1 G/DL (ref 6.1–8)
RBC # BLD AUTO: 4.6 MILL/MM3 (ref 4.2–5.4)
SODIUM SERPL-SCNC: 140 MEQ/L (ref 135–145)
TRIGL SERPL-MCNC: 123 MG/DL (ref 0–199)
VIT B12 SERPL-MCNC: 632 PG/ML (ref 211–911)
WBC # BLD AUTO: 6 THOU/MM3 (ref 4.8–10.8)

## 2024-11-13 LAB
ALLERGEN BARLEY IGE: < 0.1 KU/L (ref 0–0.34)
ALLERGEN BEEF: < 0.1 KU/L (ref 0–0.34)
ALLERGEN CABBAGE IGE: < 0.1 KU/L (ref 0–0.34)
ALLERGEN CARROT IGE: < 0.1 KU/L (ref 0–0.34)
ALLERGEN CHICKEN IGE: < 0.1 KU/L (ref 0–0.34)
ALLERGEN CODFISH IGE: < 0.1 KU/L (ref 0–0.34)
ALLERGEN CORN IGE: < 0.1 KU/L (ref 0–0.34)
ALLERGEN COW MILK IGE: < 0.1 KU/L (ref 0–0.34)
ALLERGEN CRAB IGE: < 0.1 KU/L (ref 0–0.34)
ALLERGEN EGG WHITE IGE: < 0.1 KU/L (ref 0–0.34)
ALLERGEN GLUTEN IGE: < 0.1 KU/L (ref 0–0.34)
ALLERGEN GRAPE IGE: < 0.1 KU/L (ref 0–0.34)
ALLERGEN LETTUCE IGE: < 0.1 KU/L (ref 0–0.34)
ALLERGEN NAVY BEAN: < 0.1 KU/L (ref 0–0.34)
ALLERGEN OAT: < 0.1 KU/L (ref 0–0.34)
ALLERGEN ORANGE IGE: < 0.1 KU/L (ref 0–0.34)
ALLERGEN PEANUT (F13) IGE: < 0.1 KU/L (ref 0–0.34)
ALLERGEN PEPPER C. ANNUUM IGE: < 0.1 KU/L (ref 0–0.34)
ALLERGEN PORK: < 0.1 KU/L (ref 0–0.34)
ALLERGEN RICE IGE: < 0.1 KU/L (ref 0–0.34)
ALLERGEN RYE IGE: < 0.1 KU/L (ref 0–0.34)
ALLERGEN SOYBEAN IGE: < 0.1 KU/L (ref 0–0.34)
ALLERGEN TOMATO IGE: < 0.1 KU/L (ref 0–0.34)
ALLERGEN TUNA IGE: < 0.1 KU/L (ref 0–0.34)
ALLERGEN WHEAT IGE: < 0.1 KU/L (ref 0–0.34)
IGE SERPL-ACNC: 28 IU/ML (ref 0–100)
POTATO, IGE: < 0.1 KU/L (ref 0–0.34)
SHRIMP: < 0.1 KU/L (ref 0–0.34)

## 2024-11-14 LAB
ANCA AB PATTERN SER IF-IMP: NORMAL
ANCA IGG TITR SER IF: NORMAL {TITER}
ENA SS-A 60KD AB SER-ACNC: NORMAL
ENA SS-A IGG SER QL: NORMAL
ENA SS-B IGG SER IA-ACNC: 0 AU/ML (ref 0–40)
MYELOPEROXIDASE AB SER-ACNC: 0 AU/ML (ref 0–19)
NUCLEAR IGG SER QL IA: NORMAL
PROTEINASE3 AB SER-ACNC: 0 AU/ML (ref 0–19)
STRONGYLOIDES IGG SER IA-ACNC: NORMAL

## 2024-11-18 SDOH — HEALTH STABILITY: PHYSICAL HEALTH: ON AVERAGE, HOW MANY DAYS PER WEEK DO YOU ENGAGE IN MODERATE TO STRENUOUS EXERCISE (LIKE A BRISK WALK)?: 2 DAYS

## 2024-11-18 SDOH — HEALTH STABILITY: PHYSICAL HEALTH: ON AVERAGE, HOW MANY MINUTES DO YOU ENGAGE IN EXERCISE AT THIS LEVEL?: 120 MIN

## 2024-11-18 ASSESSMENT — PATIENT HEALTH QUESTIONNAIRE - PHQ9
SUM OF ALL RESPONSES TO PHQ QUESTIONS 1-9: 0
SUM OF ALL RESPONSES TO PHQ QUESTIONS 1-9: 0
1. LITTLE INTEREST OR PLEASURE IN DOING THINGS: NOT AT ALL
SUM OF ALL RESPONSES TO PHQ9 QUESTIONS 1 & 2: 0
SUM OF ALL RESPONSES TO PHQ QUESTIONS 1-9: 0
SUM OF ALL RESPONSES TO PHQ QUESTIONS 1-9: 0
2. FEELING DOWN, DEPRESSED OR HOPELESS: NOT AT ALL

## 2024-11-18 ASSESSMENT — LIFESTYLE VARIABLES
HOW OFTEN DO YOU HAVE A DRINK CONTAINING ALCOHOL: 2
HOW MANY STANDARD DRINKS CONTAINING ALCOHOL DO YOU HAVE ON A TYPICAL DAY: 0
HOW OFTEN DO YOU HAVE SIX OR MORE DRINKS ON ONE OCCASION: 1
HOW OFTEN DO YOU HAVE A DRINK CONTAINING ALCOHOL: MONTHLY OR LESS
HOW MANY STANDARD DRINKS CONTAINING ALCOHOL DO YOU HAVE ON A TYPICAL DAY: PATIENT DOES NOT DRINK

## 2024-11-19 ENCOUNTER — TELEPHONE (OUTPATIENT)
Dept: FAMILY MEDICINE CLINIC | Age: 79
End: 2024-11-19

## 2024-11-19 ENCOUNTER — OFFICE VISIT (OUTPATIENT)
Dept: FAMILY MEDICINE CLINIC | Age: 79
End: 2024-11-19

## 2024-11-19 DIAGNOSIS — Z00.00 MEDICARE ANNUAL WELLNESS VISIT, SUBSEQUENT: Primary | ICD-10-CM

## 2024-11-19 DIAGNOSIS — E78.5 HYPERLIPIDEMIA WITH TARGET LDL LESS THAN 100: ICD-10-CM

## 2024-11-19 DIAGNOSIS — R73.01 IFG (IMPAIRED FASTING GLUCOSE): ICD-10-CM

## 2024-11-19 DIAGNOSIS — E66.01 MORBID OBESITY WITH BMI OF 40.0-44.9, ADULT: ICD-10-CM

## 2024-11-19 DIAGNOSIS — R73.01 IFG (IMPAIRED FASTING GLUCOSE): Primary | ICD-10-CM

## 2024-11-19 DIAGNOSIS — E03.9 HYPOTHYROIDISM, UNSPECIFIED TYPE: ICD-10-CM

## 2024-11-19 LAB
HBA1C MFR BLD: 5.9 %
HBA1C MFR BLD: 5.9 % (ref 4.3–5.7)

## 2024-11-19 NOTE — TELEPHONE ENCOUNTER
Patient was in office today for a follow up appointment. We did a POCT A1c in office and it came back to be 5.9%. After talking with the patient she stated that she would like to see a dietitian. She said Monday and Tuesday afternoons would be better for her. IF they call her to schedule that is okay too.     Patient would like a call, Okay to leave .

## 2024-11-19 NOTE — PATIENT INSTRUCTIONS
problems such as diabetes, high blood pressure, and high cholesterol. If you think you may have a problem with alcohol or drug use, talk to your doctor.   Medicines    Take your medicines exactly as prescribed. Call your doctor if you think you are having a problem with your medicine.     If your doctor recommends aspirin, take the amount directed each day. Make sure you take aspirin and not another kind of pain reliever, such as acetaminophen (Tylenol).   When should you call for help?   Call 911 if you have symptoms of a heart attack. These may include:    Chest pain or pressure, or a strange feeling in the chest.     Sweating.     Shortness of breath.     Pain, pressure, or a strange feeling in the back, neck, jaw, or upper belly or in one or both shoulders or arms.     Lightheadedness or sudden weakness.     A fast or irregular heartbeat.   After you call 911, the  may tell you to chew 1 adult-strength or 2 to 4 low-dose aspirin. Wait for an ambulance. Do not try to drive yourself.  Watch closely for changes in your health, and be sure to contact your doctor if you have any problems.  Where can you learn more?  Go to https://www.[employer].net/patientEd and enter F075 to learn more about \"A Healthy Heart: Care Instructions.\"  Current as of: June 24, 2023  Content Version: 14.2  © 2024 [employer].   Care instructions adapted under license by [email or web address]. If you have questions about a medical condition or this instruction, always ask your healthcare professional. [employer] disclaims any warranty or liability for your use of this information.      Personalized Preventive Plan for Eva Orellana - 11/19/2024  Medicare offers a range of preventive health benefits. Some of the tests and screenings are paid in full while other may be subject to a deductible, co-insurance, and/or copay.    Some of these benefits include a comprehensive review of your medical history including

## 2024-11-19 NOTE — PROGRESS NOTES
Chief Complaint   Patient presents with    Medicare AWV     No concerns - seeing someone for her sinus issues, and yeast infection, on diflucan        SUBJECTIVE     Eva Orellana is a 79 y.o.female    Here for Medicare Annual Wellness exam.    Patient Active Problem List   Diagnosis    Hypothyroidism    Environmental allergies    Hyperlipidemia with target LDL less than 100    Morbid obesity with BMI of 40.0-44.9, adult    ARTIS on CPAP    Herminia bullosa    Refractory obstruction of nasal airway    Chronic rhinosinusitis    Seasonal allergic rhinitis due to pollen    Status post functional endoscopic sinus surgery [Z98.890 (ICD-10-CM)]    Scar of nose    IFG (impaired fasting glucose)    Lymphadenopathy    Candida infection, esophageal (HCC)    Hypovitaminosis D    Abnormal CBC measurement    Chronic ethmoidal sinusitis    Chronic sphenoidal sinusitis    Chronic maxillary sinusitis    Obesity, Class III, BMI 40-49.9 (morbid obesity)    Abdominal aortic atherosclerosis (HCC)    Hiatal hernia    Diverticulosis    Liver cyst    DDD (degenerative disc disease), thoracolumbar    Axillary adenopathy     Following with ENT - Dr Spann  Still having some issues - was referred to Bijal Melton  Is battling yeast infection - following with Dr Summers as these are causing cysts. She wanted her to see Dr Grady and he has her on diflucan to manage. Also taking acidophilus.     Right arm goes number at night when sleeping. Thinks this is due to the way she is laying.   Neck will pop high up when she turns her head. This is happening when she is stressed. She gets a massage every week.     Review of Systems   Constitutional:  Negative for chills, diaphoresis and fever.   Respiratory:  Negative for shortness of breath.    Cardiovascular:  Negative for chest pain, palpitations and leg swelling.   Gastrointestinal:  Negative for blood in stool, constipation, diarrhea, nausea and vomiting.   Genitourinary:  Negative for dysuria

## 2024-11-24 VITALS
RESPIRATION RATE: 18 BRPM | HEART RATE: 78 BPM | HEIGHT: 68 IN | TEMPERATURE: 98.6 F | BODY MASS INDEX: 39.1 KG/M2 | WEIGHT: 258 LBS | SYSTOLIC BLOOD PRESSURE: 126 MMHG | DIASTOLIC BLOOD PRESSURE: 80 MMHG

## 2024-12-09 ENCOUNTER — OFFICE VISIT (OUTPATIENT)
Dept: INTERNAL MEDICINE CLINIC | Age: 79
End: 2024-12-09
Payer: MEDICARE

## 2024-12-09 VITALS — WEIGHT: 256 LBS | BODY MASS INDEX: 39.5 KG/M2

## 2024-12-09 DIAGNOSIS — R73.01 IFG (IMPAIRED FASTING GLUCOSE): Primary | ICD-10-CM

## 2024-12-09 DIAGNOSIS — Z71.3 DIETARY COUNSELING AND SURVEILLANCE: ICD-10-CM

## 2024-12-09 PROCEDURE — 97802 MEDICAL NUTRITION INDIV IN: CPT | Performed by: DIETITIAN, REGISTERED

## 2024-12-09 NOTE — PATIENT INSTRUCTIONS
Youtube   [employer] [facility]     Recipes     [employer]    American heart association     [email or web address]

## 2024-12-09 NOTE — PROGRESS NOTES
[facility]  [facility]  [address] [place] [address]  [phone] (phone)  [phone] (fax)    Patient Name: Eva Orellana. Date of Birth: [id number]. MRN: [id number]      Assessment: Patient is a 79 y.o. female seen for Initial MNT visit for IFG.     -Nutritionally relevant labs:   Lab Results   Component Value Date/Time    LABA1C 5.9 11/19/2024 11:36 AM    LABA1C 5.9 (H) 11/19/2024 09:27 AM    LABA1C 5.7 02/13/2023 11:47 AM    LABA1C 5.7 06/03/2022 10:19 AM    GLUCOSE 103 11/12/2024 09:05 AM    GLUCOSE 150 (H) 07/21/2024 10:45 AM    CHOL 186 11/12/2024 09:05 AM    HDL 50 11/12/2024 09:05 AM    TRIG 123 11/12/2024 09:05 AM     -Blood sugar trends: Has not been ordered to check blood sugars.   - Lifestyle: 2 days [employer] pool therapy x 1 hour and 1 hour chair exercise and stays busy    -Food recall:   Breakfast: sough dough toast, avocado, bit salt/ Oatmeal, raisins, bran, honey  Lunch: sandwich with left overs (1 pc, bread)  Snack: unsalted nuts, fruit, apple and cheese  Dinner: grits, shrimp, sausage/ big salad   Snacks: apple cheese or orange or berries w/ yogurt     -Main Beverages: 1 cup coffee, water and electrolytes, green tea  -  Current Outpatient Medications on File Prior to Visit   Medication Sig Dispense Refill    BENZOYL PEROXIDE 10 % external wash as needed      clobetasol (TEMOVATE) 0.05 % external solution as needed      fluconazole (DIFLUCAN) 200 MG tablet 1 tablet once a week      minocycline (MINOCIN;DYNACIN) 100 MG capsule Take 1 capsule by mouth daily      spironolactone (ALDACTONE) 50 MG tablet Take 1 tablet by mouth daily      montelukast (SINGULAIR) 10 MG tablet Take 1 tablet by mouth nightly 90 tablet 0    cetirizine (ZYRTEC) 10 MG tablet Take 1 tablet by mouth nightly      SYNTHROID 150 MCG tablet Take 1 tablet by mouth daily Take with water on an empty stomach- wait 30 minutes before eating or taking other meds. 90 tablet 3

## 2024-12-19 ENCOUNTER — PATIENT MESSAGE (OUTPATIENT)
Dept: FAMILY MEDICINE CLINIC | Age: 79
End: 2024-12-19

## 2024-12-19 DIAGNOSIS — M54.10 RADICULOPATHY OF ARM: Primary | ICD-10-CM

## 2024-12-26 ENCOUNTER — HOSPITAL ENCOUNTER (OUTPATIENT)
Age: 79
Discharge: HOME | End: 2024-12-26
Payer: MEDICARE

## 2024-12-26 ENCOUNTER — HOSPITAL ENCOUNTER (OUTPATIENT)
Dept: GENERAL RADIOLOGY | Age: 79
Discharge: HOME | End: 2024-12-26
Payer: MEDICARE

## 2024-12-26 DIAGNOSIS — M54.10 RADICULOPATHY OF ARM: ICD-10-CM

## 2024-12-26 PROCEDURE — 72040 X-RAY EXAM NECK SPINE 2-3 VW: CPT

## 2025-01-14 ENCOUNTER — PROCEDURE VISIT (OUTPATIENT)
Dept: ALLERGY | Age: 80
End: 2025-01-14
Payer: MEDICARE

## 2025-01-14 VITALS
SYSTOLIC BLOOD PRESSURE: 126 MMHG | WEIGHT: 251 LBS | RESPIRATION RATE: 18 BRPM | HEIGHT: 68 IN | HEART RATE: 86 BPM | BODY MASS INDEX: 38.04 KG/M2 | OXYGEN SATURATION: 98 % | DIASTOLIC BLOOD PRESSURE: 79 MMHG

## 2025-01-14 DIAGNOSIS — J30.81 CAT ALLERGIES: ICD-10-CM

## 2025-01-14 DIAGNOSIS — J30.9 ALLERGIC RHINOCONJUNCTIVITIS: ICD-10-CM

## 2025-01-14 DIAGNOSIS — Z91.038 ALLERGY TO COCKROACHES: ICD-10-CM

## 2025-01-14 DIAGNOSIS — Z91.048 ALLERGY TO MOLD: Primary | ICD-10-CM

## 2025-01-14 DIAGNOSIS — Z91.09 MITE ALLERGY: ICD-10-CM

## 2025-01-14 DIAGNOSIS — H10.10 ALLERGIC RHINOCONJUNCTIVITIS: ICD-10-CM

## 2025-01-14 DIAGNOSIS — Z91.09 POLLEN ALLERGIES: ICD-10-CM

## 2025-01-14 DIAGNOSIS — J30.81 ALLERGY TO DOG DANDER: ICD-10-CM

## 2025-01-14 DIAGNOSIS — J32.4 CHRONIC PANSINUSITIS: ICD-10-CM

## 2025-01-14 DIAGNOSIS — J30.9 ALLERGIC SINUSITIS: ICD-10-CM

## 2025-01-14 PROCEDURE — 1159F MED LIST DOCD IN RCRD: CPT | Performed by: NURSE PRACTITIONER

## 2025-01-14 PROCEDURE — 1123F ACP DISCUSS/DSCN MKR DOCD: CPT | Performed by: NURSE PRACTITIONER

## 2025-01-14 PROCEDURE — 95004 PERQ TESTS W/ALRGNC XTRCS: CPT | Performed by: NURSE PRACTITIONER

## 2025-01-14 PROCEDURE — 99215 OFFICE O/P EST HI 40 MIN: CPT | Performed by: NURSE PRACTITIONER

## 2025-01-14 ASSESSMENT — ENCOUNTER SYMPTOMS: RHINORRHEA: 1

## 2025-01-14 NOTE — PATIENT INSTRUCTIONS
Allergy Avoidance Measures  1. Shower and wash your hair before going to bed during pollen season.  2. Don't dry clothes and bedding outside where pollen and molds will stick to them.  3. Wash your hands after petting an animal.  4. Run a night light continuously in dark closets and basements to help reduce molds.  5. Place stuffed animal in the freezer for a day to kill dust mites.  6. Be aware that during the mid morning and early evening, pollen levels are the highest of the day.  7. Undress outside your bedroom, leaving allergens from other places away from where you sleep.  8. Remove and wash your clothing immediately after visiting friends with pets.  9. Seal your pillows and mattresses in allergy-proof coverings so dust mites can't get to your nose and eyes.  10. Use a dehumidifier to reduce molds, especially in damp, humid places like  basements, maintaining a humidity level between 30%-50%.  11. Remove carpeting in your home if allergic to pets. Hardwood, tile, and linoleum are easier to keep dander free.   12. If you are allergic to bees, wasps, or yellow jackets, avoid bright colored clothing, scented hairspray, deodorant or perfume, and avoid picnics and BBQ'S.  13. Dust mites and dander will accumulate in upholstered furniture.  14. To thoroughly clean, dust with a damp rag or mop rather than dry dusting/sweeping.  15. Use a vacuum  with a HEPA filter and clean/change the filter when needed.  16. Wear a dust mask while vacuuming to avoid inhaling stirred-up-dust.  17. Leave a room that was just dusted or vacuumed for at least 20 minutes to allow airborne dust to settle.   18. If you live with a pet, cover the air ducts to your bedroom if you have forced-air heating/cooling.  19. Thoroughly clean moldy areas with a 1 to 10 part diluted mixture of chlorine bleach   and water.  20. Do not allow smoking in your home.  21. Have a non-allergic person clean the cages of small animals like mice and

## 2025-01-14 NOTE — PROGRESS NOTES
@[facility]@    Allergy & Asthma   [address]  [place] [address]  Ph:   [phone]  Fax:[phone]     Provider:  Dr. Bijal Melton    Follow Up          Diagnosis Orders   1. Allergy to mold        2. Pollen allergies        3. Allergy to cockroaches        4. Allergy to dog dander        5. Mite allergy        6. Cat allergies        7. Allergic rhinoconjunctivitis        8. Allergic sinusitis        9. Chronic pansinusitis            CHIEF COMPLAINT:   Chief Complaint   Patient presents with    Allergy Testing    Results     Lab review       HISTORY OF PRESENT ILLNESS: ESTABLISHED PATIENT HERE FOR EVALUATION   Eva Orellana is a 79 y.o.  female  here today as an established patient for  allergies.  Patient reports symptoms present for years. Symptoms are worse Year-round. Has tried several different medications including loratadine. Also takes 2 -30 mg sudafed's and sometimes on in the late afternoon.  Prefers herbs over medication.  She has been taking cetirizine and montelukast which has helped.  In addition to try to control patient's sinus pressure and rhinorrhea they have tried nasal corticosteroids including Nasacort.   The medications have helped a little in controlling symptoms. Allergies are negatively impacted patient's quality of life.  Patient has frequent throat clearing related to postnasal drainage and rhinorrhea  Patient first began having allergy symptoms as an Adolescent/Adult. Denies fever, night sweats, or changes in weight..  Severity of problem to patient is moderate to severe. Patient thinks problem has worsened over time. Would like evaluated at this clinic to see if they can improve symptom control.  During the last year patient has had several sinus infections but is unsure exactly how many sinus infections she gets. Patient reports that they have a reduced sense of smell: Yes.  Patient reports they have reduced taste: Yes.  Patient has tried sinus rinses: Reported Yes.

## 2025-01-15 ENCOUNTER — TELEPHONE (OUTPATIENT)
Dept: ALLERGY | Age: 80
End: 2025-01-15

## 2025-01-15 DIAGNOSIS — M35.00 SICCA, UNSPECIFIED TYPE (HCC): ICD-10-CM

## 2025-01-15 DIAGNOSIS — R59.0 AXILLARY ADENOPATHY: ICD-10-CM

## 2025-01-15 DIAGNOSIS — K57.90 DIVERTICULOSIS: ICD-10-CM

## 2025-01-15 DIAGNOSIS — K44.9 HIATAL HERNIA: ICD-10-CM

## 2025-01-15 DIAGNOSIS — R21 BUTTERFLY RASH: ICD-10-CM

## 2025-01-15 DIAGNOSIS — I70.0 ABDOMINAL AORTIC ATHEROSCLEROSIS (HCC): ICD-10-CM

## 2025-01-15 DIAGNOSIS — R21 SKIN RASH IN PELVIC REGION: ICD-10-CM

## 2025-01-15 DIAGNOSIS — M51.35 DDD (DEGENERATIVE DISC DISEASE), THORACOLUMBAR: ICD-10-CM

## 2025-01-15 DIAGNOSIS — J30.89 NON-SEASONAL ALLERGIC RHINITIS, UNSPECIFIED TRIGGER: ICD-10-CM

## 2025-01-15 DIAGNOSIS — K76.89 LIVER CYST: ICD-10-CM

## 2025-01-15 DIAGNOSIS — R73.09 ELEVATED GLUCOSE: ICD-10-CM

## 2025-01-15 DIAGNOSIS — R53.82 CHRONIC FATIGUE: ICD-10-CM

## 2025-01-15 RX ORDER — CETIRIZINE HYDROCHLORIDE 10 MG/1
10 TABLET ORAL NIGHTLY
Qty: 90 TABLET | Refills: 3 | Status: SHIPPED | OUTPATIENT
Start: 2025-01-15

## 2025-01-15 RX ORDER — EPINEPHRINE 0.3 MG/.3ML
0.3 INJECTION SUBCUTANEOUS PRN
Qty: 1 EACH | Refills: 0 | Status: SHIPPED | OUTPATIENT
Start: 2025-01-15

## 2025-01-15 RX ORDER — MONTELUKAST SODIUM 10 MG/1
10 TABLET ORAL NIGHTLY
Qty: 90 TABLET | Refills: 3 | Status: SHIPPED | OUTPATIENT
Start: 2025-01-15

## 2025-01-15 NOTE — TELEPHONE ENCOUNTER
Called pt and left a detailed message that provider sent in an epi pen for her to need for allergy injections and will need to bring with her every visit as talked about with provider.

## 2025-01-15 NOTE — PROGRESS NOTES
Patient wishes to start allergy injections.  Will send an EpiPen and refill montelukast.    [facility]  Dr. Bijal Melton  [address] [place] [id number] [phone]  Allergy Injection Guide      Dear Patient,    Your doctor has recommended allergy shots (immunotherapy or I.T.) for the treatment of your allergies. Considerable dedication is required of the patient/family who have agreed to immunotherapy. The injections are weekly or bi-weekly injections until a high dose of allergen is reached. Only after a high or “maintenance dose” is reached will the full benefit of the injections be appreciated. It may take up to a year for symptom relief to occur. The full effect of the treatment will need to be adhered to for a minimum of three years. Following the weekly build-up dose, maintenance injections are slowly tapered to a minimum of once a month injections. Missed shots or reactions to shots can delay reaching maintenance dosage. A six-month follow-up visit with the provider is required in order to assess progress; yearly visits are then required. Please inform our office if you feel that the injections are not working. Let us know as soon as possible if you become pregnant. We will not build up your doses until after your baby is born. As with any long-term treatment or therapy, yearly evaluations and assessments by your provider are necessary to optimize and safely monitor your response. Your provider will periodically review with you your progress at scheduled appointments. However, situations may arise in between visits that may be important to the success of your treatment. Therefore, we ask your cooperation in alerting the injection personnel before receiving an allergy injection of any changes in your health (heart disease, diabetes, cancer, pregnancy etc.) or medications (specifically beta-blockers prescribed by other providers and any over-the-counter, homeopathic, herbal or

## 2025-01-16 NOTE — TELEPHONE ENCOUNTER
Called pt to ensure she got my message for the epi pen. She states yes and going to pharmacy to  today because she got a text it was ready.  Pt verbalized understanding and thanked me.

## 2025-01-20 DIAGNOSIS — Z29.89 IMMUNOTHERAPY: ICD-10-CM

## 2025-01-20 DIAGNOSIS — J30.9 CHRONIC ALLERGIC RHINITIS: Primary | ICD-10-CM

## 2025-01-20 PROCEDURE — 95165 ANTIGEN THERAPY SERVICES: CPT | Performed by: NURSE PRACTITIONER

## 2025-01-20 NOTE — PROGRESS NOTES
TYPE OF INSURANCE: MEDICARE  Pt has agreed to start allergy injections. Allergy serums mixed.   ALLERGY EXTRACT MIXING.    DATE OF MIXING= 1/20/25  TOTAL NUMBER OF SET OF VIALS= 3  TOTAL VIALS PREPARED = 3  TOTAL INJECTABLE DOSES =   50 INJECTIONS PER SET  TOTAL ANTICIPATED DOSES = 150 INJECTIONS   TOTAL NUMBER OF INJECTIONS BILLED TODAY = 30       An allergy extract was prepared according to written prescription by provider.  Provider onsite during allergy extract preparation. Patient vial(s) include the following:     RED VIAL - 1:1 CONCENTRATION - EXPIRES 12 MONTHS  YELLOW VIAL-1:10 CONCENTRATION - EXPIRES 12 MONTHS  BLUE VIAL-1:100 CONCENTRATION - EXPIRES 12 MONTHS  GREEN VIAL-1:1,000 CONCENTRATION - EXPIRES 6 MONTHS  SILVER VIAL-1:10,000 CONCENTRATION - EXPIRES 6 MONTHS    Allergy extract was prepared by the following method:   RED TO YELLOW:  Once the  one-to-one concentration was prepared in the red vial, 0.5 ML's was then extracted in place into the yellow vial to achieve a 1:10 concentration.    YELLOW TO BLUE:  Then 0.5 ML's was extracted from the yellow vial and placed into the blue file with dilutant to achieve a 1:100 concentration.    BLUE TO GREEN:  Then 0.5 ML's was extracted from the blue vial and placed into Green vial with dilute to achieve a 1:1,000 concentration.    GREEN TO SILVER:  Then 0.5 ML's was taken from the green vial and placed in the Silver vial with dilutant to achieve a 1:10,000 concentration.      Patient vials were labeled with name, date-of-birth, vial (A,B,or C), concentration, and expiration.    When injecting from a new  vial the first injections is 0.05 ml and are increased by 0.05 increments until 0.5ml from the vial is achieved or the patient reaches maximum tolerated dose.   Injections are given once ot three times weekly and at least 24 hours apart, according to provider orders.   If patient has complications or \"knots\" or maximum tolerance the dose building is reduced,

## 2025-01-22 ENCOUNTER — OFFICE VISIT (OUTPATIENT)
Dept: ENT CLINIC | Age: 80
End: 2025-01-22
Payer: MEDICARE

## 2025-01-22 VITALS
WEIGHT: 251 LBS | DIASTOLIC BLOOD PRESSURE: 62 MMHG | HEART RATE: 94 BPM | RESPIRATION RATE: 18 BRPM | BODY MASS INDEX: 38.04 KG/M2 | TEMPERATURE: 97.7 F | SYSTOLIC BLOOD PRESSURE: 118 MMHG | HEIGHT: 68 IN | OXYGEN SATURATION: 97 %

## 2025-01-22 DIAGNOSIS — Z98.890 STATUS POST FUNCTIONAL ENDOSCOPIC SINUS SURGERY: ICD-10-CM

## 2025-01-22 DIAGNOSIS — J30.2 SEASONAL ALLERGIES: ICD-10-CM

## 2025-01-22 DIAGNOSIS — J32.9 CHRONIC RHINOSINUSITIS: Primary | ICD-10-CM

## 2025-01-22 PROCEDURE — 99213 OFFICE O/P EST LOW 20 MIN: CPT | Performed by: OTOLARYNGOLOGY

## 2025-01-22 PROCEDURE — 1159F MED LIST DOCD IN RCRD: CPT | Performed by: OTOLARYNGOLOGY

## 2025-01-22 PROCEDURE — 1123F ACP DISCUSS/DSCN MKR DOCD: CPT | Performed by: OTOLARYNGOLOGY

## 2025-01-23 NOTE — PROGRESS NOTES
[facility]      Eva Orellana         [id number]  1/24/2025   Chief Complaint   Patient presents with    Follow-up     ARTIS 1 year f/u with [employer] download.         Patient of Dr. Cohen     Assessment/Plan   1. ARTIS on CPAP  - DME Order for CPAP as OP    2. Obesity (BMI 30-39.9)         -Yearly supply order placed? Yes   -Download was personally reviewed and discussed with patient at length.  -The symptoms of ARTIS have improved. The patient is benefiting from therapy and should continue use of PAP device.  -Patient's symptoms and AHI are controlled with current settings of 10 cmH2O. Continue current pressure settings.  -Advised to continue current positive airway pressure therapy with above described pressure.   -Advised to keep good compliance with current recommended pressure to get optimal results and clinical improvement  -Recommend 7-9 hours of sleep with PAP  -Instructed to call DME company regarding supplies if needed.   -Patient to call my office for earlier appointment if needed for worsening of sleep symptoms.   -Patient is not to drive if feeling sleepy  -Counseled patient on weight loss    Educated about my impression and plan. Patient verbalizes understanding.      Return in about 1 year (around 1/24/2026) for artis. with download.      Subjective   Presentation:   Eva presents for sleep medicine follow up for obstructive sleep apnea.  Since the last visit, Eva received a new machine.  It is currently a loaner. She will be eligible for a new machine in 2027.   Had 2 sinus surgeries with Spann in last 1.5 yr      Sleep issues:  Do you feel better? Yes  More rested?Yes   Better concentration? yes  Difficulty falling sleep?  No  Difficulty staying asleep?  No  Snoring?  No    Progress History:   Since last visit any new medical issues? No  Any new or changes in medicines? No  Any new sleep medicines? No    PAP Download:   Original or initial AHI:

## 2025-01-23 NOTE — PROGRESS NOTES
[facility] [place] [facility]  [facility] EAR, NOSE AND THROAT  [address]  [address]  [facility] [address]  Dept: [phone]  Dept Fax: [phone]  Loc: [phone]    Eva Orellana is a 79 y.o. female who was referred by No ref. provider found for:  Chief Complaint   Patient presents with    Follow-up     Patient is here today for a 6 mo f/u with CT scan prior. Patient states she isn't doing too bad she states that she has been taking some allergy meds and states she has been doing pretty well and is also getting ready to begin allergy shots.       HPI:       History of Present Illness  Eva Orellana is a 79 y.o. female who presents for a follow-up evaluation of her chronic sinusitis disease.    She reports an improvement in her nasal condition, with no current symptoms of congestion. She is able to breathe through her nose without difficulty. She has been adhering to her prescribed treatment regimen, which includes Diflucan, and has found it beneficial for her sinus condition. She underwent a scan in 10/2024 and has not had another since. She is scheduled to start allergy treatments on 01/30/2025, with a frequency of two sessions per week for several months. She anticipates significant relief from her symptoms by the spring season. She also experiences allergies to dust and other environmental factors. Her current medication regimen includes evening doses of Zyrtec and Singulair. She has discontinued the use of Sudafed 30 mg and Nasacort as per her physician's advice. She maintains a routine of nasal rinsing every other day.    ALLERGIES  The patient has allergies to DUST.    MEDICATIONS  Current: Diflucan, Zyrtec, Singulair  Discontinued: Sudafed, Nasacort        History:     Allergies   Allergen Reactions    Seasonal      Current Outpatient Medications   Medication Sig Dispense Refill    EPINEPHrine (EPIPEN 2-GONZÁLEZ) 0.3 MG/0.3ML SOAJ injection Inject 0.3 mLs into the muscle as

## 2025-01-24 ENCOUNTER — OFFICE VISIT (OUTPATIENT)
Dept: PULMONOLOGY | Age: 80
End: 2025-01-24
Payer: MEDICARE

## 2025-01-24 VITALS
SYSTOLIC BLOOD PRESSURE: 110 MMHG | TEMPERATURE: 98.3 F | DIASTOLIC BLOOD PRESSURE: 60 MMHG | WEIGHT: 251 LBS | HEART RATE: 81 BPM | HEIGHT: 67 IN | OXYGEN SATURATION: 94 % | BODY MASS INDEX: 39.39 KG/M2

## 2025-01-24 DIAGNOSIS — G47.33 OSA ON CPAP: Primary | ICD-10-CM

## 2025-01-24 DIAGNOSIS — E66.9 OBESITY (BMI 30-39.9): ICD-10-CM

## 2025-01-24 PROCEDURE — 99214 OFFICE O/P EST MOD 30 MIN: CPT

## 2025-01-24 PROCEDURE — 1159F MED LIST DOCD IN RCRD: CPT

## 2025-01-24 PROCEDURE — 1123F ACP DISCUSS/DSCN MKR DOCD: CPT

## 2025-01-24 ASSESSMENT — ENCOUNTER SYMPTOMS
RHINORRHEA: 0
SHORTNESS OF BREATH: 0
WHEEZING: 0
SINUS PAIN: 0
COUGH: 0
SORE THROAT: 0
SINUS PRESSURE: 0

## 2025-02-03 ENCOUNTER — OFFICE VISIT (OUTPATIENT)
Dept: FAMILY MEDICINE CLINIC | Age: 80
End: 2025-02-03

## 2025-02-03 VITALS
RESPIRATION RATE: 16 BRPM | SYSTOLIC BLOOD PRESSURE: 116 MMHG | BODY MASS INDEX: 39.16 KG/M2 | HEART RATE: 80 BPM | DIASTOLIC BLOOD PRESSURE: 74 MMHG | TEMPERATURE: 98.5 F | WEIGHT: 250 LBS

## 2025-02-03 DIAGNOSIS — J06.9 VIRAL URI: Primary | ICD-10-CM

## 2025-02-03 DIAGNOSIS — R50.9 FEVER, UNSPECIFIED FEVER CAUSE: ICD-10-CM

## 2025-02-03 DIAGNOSIS — R52 BODY ACHES: ICD-10-CM

## 2025-02-03 LAB
INFLUENZA A ANTIBODY: NORMAL
INFLUENZA B ANTIBODY: NORMAL
Lab: NORMAL
QC PASS/FAIL: NORMAL
SARS-COV-2 RDRP RESP QL NAA+PROBE: NEGATIVE

## 2025-02-03 RX ORDER — DEXTROMETHORPHAN HYDROBROMIDE AND PROMETHAZINE HYDROCHLORIDE 15; 6.25 MG/5ML; MG/5ML
5 SYRUP ORAL 4 TIMES DAILY PRN
Qty: 118 ML | Refills: 0 | Status: SHIPPED | OUTPATIENT
Start: 2025-02-03 | End: 2025-02-10

## 2025-02-03 SDOH — ECONOMIC STABILITY: FOOD INSECURITY: WITHIN THE PAST 12 MONTHS, THE FOOD YOU BOUGHT JUST DIDN'T LAST AND YOU DIDN'T HAVE MONEY TO GET MORE.: NEVER TRUE

## 2025-02-03 SDOH — ECONOMIC STABILITY: FOOD INSECURITY: WITHIN THE PAST 12 MONTHS, YOU WORRIED THAT YOUR FOOD WOULD RUN OUT BEFORE YOU GOT MONEY TO BUY MORE.: NEVER TRUE

## 2025-02-03 ASSESSMENT — PATIENT HEALTH QUESTIONNAIRE - PHQ9
2. FEELING DOWN, DEPRESSED OR HOPELESS: NOT AT ALL
SUM OF ALL RESPONSES TO PHQ QUESTIONS 1-9: 0
1. LITTLE INTEREST OR PLEASURE IN DOING THINGS: NOT AT ALL
SUM OF ALL RESPONSES TO PHQ QUESTIONS 1-9: 0
SUM OF ALL RESPONSES TO PHQ9 QUESTIONS 1 & 2: 0
SUM OF ALL RESPONSES TO PHQ QUESTIONS 1-9: 0
SUM OF ALL RESPONSES TO PHQ QUESTIONS 1-9: 0

## 2025-02-03 ASSESSMENT — ENCOUNTER SYMPTOMS
CONSTIPATION: 0
VOMITING: 0
BLOOD IN STOOL: 0
NAUSEA: 0
DIARRHEA: 0
SHORTNESS OF BREATH: 0
COUGH: 1

## 2025-02-03 NOTE — PROGRESS NOTES
Chief Complaint   Patient presents with    Cough     Pt here for headache,fever,body ache and cough          SUBJECTIVE     Eva Orellana is a 79 y.o.female      Pt complains of fever, headaches, body aches, cough starting Friday. Tmax 100.0. No GI symptoms. She has tried mucinex, tylenol, Singulair, and zyrtec. Has not taken a home covid test.     Review of Systems   Constitutional:  Positive for activity change, fatigue and fever. Negative for chills and diaphoresis.   Respiratory:  Positive for cough. Negative for shortness of breath.    Cardiovascular:  Negative for chest pain, palpitations and leg swelling.   Gastrointestinal:  Negative for blood in stool, constipation, diarrhea, nausea and vomiting.   Genitourinary:  Negative for dysuria and hematuria.   Musculoskeletal:  Positive for myalgias.   Neurological:  Positive for headaches. Negative for dizziness.   All other systems reviewed and are negative.      OBJECTIVE     /74 (Site: Left Upper Arm, Position: Sitting)   Pulse 80   Temp 98.5 °F (36.9 °C)   Resp 16   Wt 113.4 kg (250 lb)   LMP 02/15/1995   BMI 39.16 kg/m²     Physical Exam  Vitals and nursing note reviewed.   Constitutional:       Appearance: She is well-developed. She is ill-appearing.   HENT:      Head: Normocephalic and atraumatic.      Right Ear: External ear normal.      Left Ear: External ear normal.      Nose: Congestion and rhinorrhea present.   Eyes:      Conjunctiva/sclera: Conjunctivae normal.      Pupils: Pupils are equal, round, and reactive to light.   Cardiovascular:      Rate and Rhythm: Normal rate and regular rhythm.      Heart sounds: Normal heart sounds.   Pulmonary:      Effort: Pulmonary effort is normal.      Breath sounds: Normal breath sounds.      Comments: +Cough during visit  Abdominal:      General: Bowel sounds are normal.      Palpations: Abdomen is soft.   Musculoskeletal:         General: Normal range of motion.      Cervical back: Normal range of

## 2025-02-24 ENCOUNTER — NURSE ONLY (OUTPATIENT)
Dept: ALLERGY | Age: 80
End: 2025-02-24
Payer: MEDICARE

## 2025-02-24 VITALS
DIASTOLIC BLOOD PRESSURE: 81 MMHG | HEART RATE: 86 BPM | OXYGEN SATURATION: 98 % | RESPIRATION RATE: 18 BRPM | SYSTOLIC BLOOD PRESSURE: 115 MMHG

## 2025-02-24 DIAGNOSIS — J30.9 CHRONIC ALLERGIC RHINITIS: ICD-10-CM

## 2025-02-24 DIAGNOSIS — J30.1 SEASONAL ALLERGIC RHINITIS DUE TO POLLEN: Primary | ICD-10-CM

## 2025-02-24 PROCEDURE — 95117 IMMUNOTHERAPY INJECTIONS: CPT | Performed by: NURSE PRACTITIONER

## 2025-02-24 NOTE — PROGRESS NOTES
PATIENT HAS THE FOLLOWING DIAGNOSIS SUPPORTING ADMINISTRATION OF ALLERGY INJECTIONS: J30.1Allergic rhinitis due to pollen  AND 07180 MULTIPLE ALLERGY INJECTIONS FOR ALLERGY INJECTION ADMINISTRATION      Patient here for allergy injection today.  Patient was presented with the opportunity to speak with provider and ask questions regarding allergy injections.  Patient was also instructed they need to wait 30 minutes after receiving allergy injections. All risks associated with potential adverse effects have been explained to patient and patient handout was provided following allergy testing.    After consent obtained/verified, allergy injection subcutaneously given in back of arm(s).  Please see below for specific details of site injections, concentration of serum, and volume injected.    VIAL COLOR OF ALL VIALS TODAY IS silver 1:10,000. Vial allergy w/v concentration today is: 1:10,000     ALLERGY INJECTION FROM VIAL A GIVEN left  UPPER ARM IN THE AMOUNT OF 0.05 ML    ALLERGY INJECTION FROM VIAL B GIVEN right upper ARM IN THE AMOUNT OF 0.05  ML    ALLERGY INJECTION FROM VIAL C GIVEN rightlower ARM IN THE AMOUNT OF 0.05 ML      Documentation of vial injection specific to arm(s) noted on Allergy Immunotherapy Administration Form.       Patient waited 30 minutes for observation.No  Patient has received information and verbalizes understanding of the potential  health risks associated with allergy injections . Patient understands risks including anaphylaxis and chooses not to wait 30 minutes after administration of allergy injection(s).    Patient tolerated well without adverse reaction while the patient was in the office.    SHOT REACTION TREATMENT INSTRUCTIONS    During the 30 minute wait after an allergy injection the following symptoms should be reported:    Itching other than at the injection site  Hives or swelling other than at the injection site  Redness other than at the injection site  Difficulty

## 2025-02-26 ENCOUNTER — NURSE ONLY (OUTPATIENT)
Dept: ALLERGY | Age: 80
End: 2025-02-26
Payer: MEDICARE

## 2025-02-26 VITALS
SYSTOLIC BLOOD PRESSURE: 122 MMHG | OXYGEN SATURATION: 98 % | DIASTOLIC BLOOD PRESSURE: 83 MMHG | RESPIRATION RATE: 18 BRPM | HEART RATE: 77 BPM

## 2025-02-26 DIAGNOSIS — J30.1 SEASONAL ALLERGIC RHINITIS DUE TO POLLEN: Primary | ICD-10-CM

## 2025-02-26 DIAGNOSIS — J30.9 CHRONIC ALLERGIC RHINITIS: ICD-10-CM

## 2025-02-26 PROCEDURE — 95117 IMMUNOTHERAPY INJECTIONS: CPT | Performed by: NURSE PRACTITIONER

## 2025-02-26 NOTE — PROGRESS NOTES
PATIENT HAS THE FOLLOWING DIAGNOSIS SUPPORTING ADMINISTRATION OF ALLERGY INJECTIONS: J30.1Allergic rhinitis due to pollen  AND [id number] MULTIPLE ALLERGY INJECTIONS FOR ALLERGY INJECTION ADMINISTRATION      Patient here for allergy injection today.  Patient was presented with the opportunity to speak with provider and ask questions regarding allergy injections.  Patient was also instructed they need to wait 30 minutes after receiving allergy injections. All risks associated with potential adverse effects have been explained to patient and patient handout was provided following allergy testing.    After consent obtained/verified, allergy injection subcutaneously given in back of arm(s).  Please see below for specific details of site injections, concentration of serum, and volume injected.    VIAL COLOR OF ALL VIALS TODAY IS silver 1:10,000. Vial allergy w/v concentration today is: 1:10,000     ALLERGY INJECTION FROM VIAL A GIVEN right  UPPER ARM IN THE AMOUNT OF 0.10 ML    ALLERGY INJECTION FROM VIAL B GIVEN left upper ARM IN THE AMOUNT OF 0.10  ML    ALLERGY INJECTION FROM VIAL C GIVEN leftlower ARM IN THE AMOUNT OF 0.10 ML      Documentation of vial injection specific to arm(s) noted on Allergy Immunotherapy Administration Form.       Patient waited 30 minutes for observation.No  Patient has received information and verbalizes understanding of the potential  health risks associated with allergy injections . Patient understands risks including anaphylaxis and chooses not to wait 30 minutes after administration of allergy injection(s).    Patient tolerated well without adverse reaction while the patient was in the office.    SHOT REACTION TREATMENT INSTRUCTIONS    During the 30 minute wait after an allergy injection the following symptoms should be reported:    Itching other than at the injection site  Hives or swelling other than at the injection site  Redness other than at the injection site  Difficulty

## 2025-03-03 ENCOUNTER — NURSE ONLY (OUTPATIENT)
Dept: ALLERGY | Age: 80
End: 2025-03-03
Payer: MEDICARE

## 2025-03-03 VITALS
DIASTOLIC BLOOD PRESSURE: 81 MMHG | OXYGEN SATURATION: 96 % | RESPIRATION RATE: 16 BRPM | SYSTOLIC BLOOD PRESSURE: 119 MMHG

## 2025-03-03 DIAGNOSIS — J30.9 CHRONIC ALLERGIC RHINITIS: ICD-10-CM

## 2025-03-03 DIAGNOSIS — J30.1 SEASONAL ALLERGIC RHINITIS DUE TO POLLEN: Primary | ICD-10-CM

## 2025-03-03 PROCEDURE — 95117 IMMUNOTHERAPY INJECTIONS: CPT | Performed by: NURSE PRACTITIONER

## 2025-03-03 NOTE — PROGRESS NOTES
breathing  Chest tightness  Difficulty swallowing  Throat tightness    If these symptoms occur, NOTIFY PROVIDER and the following treatment should be administered:    1.  Epinephrine/Auvi Q 1:1000 IM - 0.3 ml if > 66 lbs or more, 0.15 ml if 33 - 63 lbs, or 0.1 ml if <33 lbs     2.  Diphenhydramine - give all intramuscular:     2 to <6 years (off-label use): 6.25 mg,    6 to <12 years: 12.5 to 25 mg;    >=12 years: 25-50 mg.    3.  Famotidine:  Adults 40 mg oral    Adolescents age 16 years and >88 lbs: 40 mg    Children and Adolescents <=16 years of age: Initial: 0.25 mg/kg/dose  every 12 hours (maximum daily dose: 40 mg/day)    Epi/Auvi Q dose may me repeated in 5-15 minutes if adequate resolution of symptoms does not occur    Patient should be observed for at least one hour after final Epi/Auvi Q dose and must be seen by provider.  Patients cannot drive themselves if they have received diphenhydramine.

## 2025-03-05 ENCOUNTER — NURSE ONLY (OUTPATIENT)
Dept: ALLERGY | Age: 80
End: 2025-03-05
Payer: MEDICARE

## 2025-03-05 VITALS
OXYGEN SATURATION: 98 % | RESPIRATION RATE: 18 BRPM | HEART RATE: 80 BPM | SYSTOLIC BLOOD PRESSURE: 127 MMHG | DIASTOLIC BLOOD PRESSURE: 70 MMHG

## 2025-03-05 DIAGNOSIS — J30.1 SEASONAL ALLERGIC RHINITIS DUE TO POLLEN: Primary | ICD-10-CM

## 2025-03-05 DIAGNOSIS — J30.9 CHRONIC ALLERGIC RHINITIS: ICD-10-CM

## 2025-03-05 PROCEDURE — 95117 IMMUNOTHERAPY INJECTIONS: CPT | Performed by: NURSE PRACTITIONER

## 2025-03-10 ENCOUNTER — NURSE ONLY (OUTPATIENT)
Dept: ALLERGY | Age: 80
End: 2025-03-10
Payer: MEDICARE

## 2025-03-10 VITALS
DIASTOLIC BLOOD PRESSURE: 75 MMHG | OXYGEN SATURATION: 96 % | SYSTOLIC BLOOD PRESSURE: 132 MMHG | HEART RATE: 88 BPM | RESPIRATION RATE: 18 BRPM

## 2025-03-10 DIAGNOSIS — J30.9 CHRONIC ALLERGIC RHINITIS: ICD-10-CM

## 2025-03-10 DIAGNOSIS — J30.1 SEASONAL ALLERGIC RHINITIS DUE TO POLLEN: Primary | ICD-10-CM

## 2025-03-10 PROCEDURE — 95117 IMMUNOTHERAPY INJECTIONS: CPT | Performed by: NURSE PRACTITIONER

## 2025-03-10 NOTE — PROGRESS NOTES
PATIENT HAS THE FOLLOWING DIAGNOSIS SUPPORTING ADMINISTRATION OF ALLERGY INJECTIONS: J30.1Allergic rhinitis due to pollen  AND 32211 MULTIPLE ALLERGY INJECTIONS FOR ALLERGY INJECTION ADMINISTRATION      Patient here for allergy injection today.  Patient was presented with the opportunity to speak with provider and ask questions regarding allergy injections.  Patient was also instructed they need to wait 30 minutes after receiving allergy injections. All risks associated with potential adverse effects have been explained to patient and patient handout was provided following allergy testing.    After consent obtained/verified, allergy injection subcutaneously given in back of arm(s).  Please see below for specific details of site injections, concentration of serum, and volume injected.    VIAL COLOR OF ALL VIALS TODAY IS silver 1:10,000. Vial allergy w/v concentration today is: 1:10,000     ALLERGY INJECTION FROM VIAL A GIVEN left  UPPER ARM IN THE AMOUNT OF 0.25 ML    ALLERGY INJECTION FROM VIAL B GIVEN right upper ARM IN THE AMOUNT OF 0.25  ML    ALLERGY INJECTION FROM VIAL C GIVEN rightlower ARM IN THE AMOUNT OF 0.25 ML      Documentation of vial injection specific to arm(s) noted on Allergy Immunotherapy Administration Form.       Patient waited 30 minutes for observation.yes  Patient has received information and verbalizes understanding of the potential  health risks associated with allergy injections . Patient understands risks including anaphylaxis and chooses not to wait 30 minutes after administration of allergy injection(s).    Patient tolerated well without adverse reaction while the patient was in the office.    SHOT REACTION TREATMENT INSTRUCTIONS    During the 30 minute wait after an allergy injection the following symptoms should be reported:    Itching other than at the injection site  Hives or swelling other than at the injection site  Redness other than at the injection site  Difficulty

## 2025-03-12 ENCOUNTER — NURSE ONLY (OUTPATIENT)
Dept: ALLERGY | Age: 80
End: 2025-03-12

## 2025-03-12 VITALS
HEART RATE: 74 BPM | DIASTOLIC BLOOD PRESSURE: 84 MMHG | OXYGEN SATURATION: 98 % | RESPIRATION RATE: 18 BRPM | SYSTOLIC BLOOD PRESSURE: 140 MMHG

## 2025-03-12 DIAGNOSIS — J30.9 CHRONIC ALLERGIC RHINITIS: ICD-10-CM

## 2025-03-12 DIAGNOSIS — J30.1 SEASONAL ALLERGIC RHINITIS DUE TO POLLEN: Primary | ICD-10-CM

## 2025-03-12 NOTE — PROGRESS NOTES
PATIENT HAS THE FOLLOWING DIAGNOSIS SUPPORTING ADMINISTRATION OF ALLERGY INJECTIONS: J30.1Allergic rhinitis due to pollen  AND 77713 MULTIPLE ALLERGY INJECTIONS FOR ALLERGY INJECTION ADMINISTRATION      Patient here for allergy injection today.  Patient was presented with the opportunity to speak with provider and ask questions regarding allergy injections.  Patient was also instructed they need to wait 30 minutes after receiving allergy injections. All risks associated with potential adverse effects have been explained to patient and patient handout was provided following allergy testing.    After consent obtained/verified, allergy injection subcutaneously given in back of arm(s).  Please see below for specific details of site injections, concentration of serum, and volume injected.    VIAL COLOR OF ALL VIALS TODAY IS silver 1:10,000. Vial allergy w/v concentration today is: 1:10,000     ALLERGY INJECTION FROM VIAL A GIVEN right  UPPER ARM IN THE AMOUNT OF 0.30 ML    ALLERGY INJECTION FROM VIAL B GIVEN left upper ARM IN THE AMOUNT OF 0.30  ML    ALLERGY INJECTION FROM VIAL C GIVEN leftlower ARM IN THE AMOUNT OF 0.30 ML      Documentation of vial injection specific to arm(s) noted on Allergy Immunotherapy Administration Form.       Patient waited 30 minutes for observation.no  Patient has received information and verbalizes understanding of the potential  health risks associated with allergy injections . Patient understands risks including anaphylaxis and chooses not to wait 30 minutes after administration of allergy injection(s).    Patient tolerated well without adverse reaction while the patient was in the office.    SHOT REACTION TREATMENT INSTRUCTIONS    During the 30 minute wait after an allergy injection the following symptoms should be reported:    Itching other than at the injection site  Hives or swelling other than at the injection site  Redness other than at the injection site  Difficulty

## 2025-03-17 ENCOUNTER — NURSE ONLY (OUTPATIENT)
Dept: ALLERGY | Age: 80
End: 2025-03-17
Payer: MEDICARE

## 2025-03-17 VITALS
SYSTOLIC BLOOD PRESSURE: 116 MMHG | RESPIRATION RATE: 18 BRPM | OXYGEN SATURATION: 97 % | HEART RATE: 90 BPM | DIASTOLIC BLOOD PRESSURE: 81 MMHG

## 2025-03-17 DIAGNOSIS — J30.9 CHRONIC ALLERGIC RHINITIS: ICD-10-CM

## 2025-03-17 DIAGNOSIS — J30.1 SEASONAL ALLERGIC RHINITIS DUE TO POLLEN: Primary | ICD-10-CM

## 2025-03-17 PROCEDURE — 95117 IMMUNOTHERAPY INJECTIONS: CPT | Performed by: NURSE PRACTITIONER

## 2025-03-17 NOTE — PROGRESS NOTES
PATIENT HAS THE FOLLOWING DIAGNOSIS SUPPORTING ADMINISTRATION OF ALLERGY INJECTIONS: J30.1Allergic rhinitis due to pollen  AND [id number] MULTIPLE ALLERGY INJECTIONS FOR ALLERGY INJECTION ADMINISTRATION      Patient here for allergy injection today.  Patient was presented with the opportunity to speak with provider and ask questions regarding allergy injections.  Patient was also instructed they need to wait 30 minutes after receiving allergy injections. All risks associated with potential adverse effects have been explained to patient and patient handout was provided following allergy testing.    After consent obtained/verified, allergy injection subcutaneously given in back of arm(s).  Please see below for specific details of site injections, concentration of serum, and volume injected.    VIAL COLOR OF ALL VIALS TODAY IS silver 1:10,000. Vial allergy w/v concentration today is: 1:10,000     ALLERGY INJECTION FROM VIAL A GIVEN left  UPPER ARM IN THE AMOUNT OF 0.35 ML    ALLERGY INJECTION FROM VIAL B GIVEN right lower ARM IN THE AMOUNT OF 0.35  ML    ALLERGY INJECTION FROM VIAL C GIVEN rightupper ARM IN THE AMOUNT OF 0.35 ML      Documentation of vial injection specific to arm(s) noted on Allergy Immunotherapy Administration Form.       Patient waited 30 minutes for observation.Yes  Patient has received information and verbalizes understanding of the potential  health risks associated with allergy injections . Patient understands risks including anaphylaxis and chooses not to wait 30 minutes after administration of allergy injection(s).    Patient tolerated well without adverse reaction while the patient was in the office.    SHOT REACTION TREATMENT INSTRUCTIONS    During the 30 minute wait after an allergy injection the following symptoms should be reported:    Itching other than at the injection site  Hives or swelling other than at the injection site  Redness other than at the injection site  Difficulty

## 2025-03-19 ENCOUNTER — CLINICAL SUPPORT (OUTPATIENT)
Dept: ALLERGY | Age: 80
End: 2025-03-19

## 2025-03-19 VITALS
SYSTOLIC BLOOD PRESSURE: 130 MMHG | OXYGEN SATURATION: 96 % | DIASTOLIC BLOOD PRESSURE: 77 MMHG | HEART RATE: 83 BPM | RESPIRATION RATE: 18 BRPM

## 2025-03-19 DIAGNOSIS — J30.9 CHRONIC ALLERGIC RHINITIS: ICD-10-CM

## 2025-03-19 DIAGNOSIS — J30.1 SEASONAL ALLERGIC RHINITIS DUE TO POLLEN: Primary | ICD-10-CM

## 2025-03-19 NOTE — PROGRESS NOTES
PATIENT HAS THE FOLLOWING DIAGNOSIS SUPPORTING ADMINISTRATION OF ALLERGY INJECTIONS: J30.1Allergic rhinitis due to pollen  AND 37530 MULTIPLE ALLERGY INJECTIONS FOR ALLERGY INJECTION ADMINISTRATION      Patient here for allergy injection today.  Patient was presented with the opportunity to speak with provider and ask questions regarding allergy injections.  Patient was also instructed they need to wait 30 minutes after receiving allergy injections. All risks associated with potential adverse effects have been explained to patient and patient handout was provided following allergy testing.    After consent obtained/verified, allergy injection subcutaneously given in back of arm(s).  Please see below for specific details of site injections, concentration of serum, and volume injected.    VIAL COLOR OF ALL VIALS TODAY IS silver 1:10,000. Vial allergy w/v concentration today is: 1:10,000     ALLERGY INJECTION FROM VIAL A GIVEN right  UPPER ARM IN THE AMOUNT OF 0.40 ML    ALLERGY INJECTION FROM VIAL B GIVEN left lower ARM IN THE AMOUNT OF 0.40  ML    ALLERGY INJECTION FROM VIAL C GIVEN leftupper ARM IN THE AMOUNT OF 0.40 ML      Documentation of vial injection specific to arm(s) noted on Allergy Immunotherapy Administration Form.       Patient waited 30 minutes for observation.No  Patient has received information and verbalizes understanding of the potential  health risks associated with allergy injections . Patient understands risks including anaphylaxis and chooses not to wait 30 minutes after administration of allergy injection(s).    Patient tolerated well without adverse reaction while the patient was in the office.    SHOT REACTION TREATMENT INSTRUCTIONS    During the 30 minute wait after an allergy injection the following symptoms should be reported:    Itching other than at the injection site  Hives or swelling other than at the injection site  Redness other than at the injection site  Difficulty

## 2025-03-24 ENCOUNTER — CLINICAL SUPPORT (OUTPATIENT)
Dept: ALLERGY | Age: 80
End: 2025-03-24
Payer: MEDICARE

## 2025-03-24 VITALS — OXYGEN SATURATION: 93 % | SYSTOLIC BLOOD PRESSURE: 145 MMHG | HEART RATE: 91 BPM | DIASTOLIC BLOOD PRESSURE: 82 MMHG

## 2025-03-24 DIAGNOSIS — J30.9 CHRONIC ALLERGIC RHINITIS: ICD-10-CM

## 2025-03-24 DIAGNOSIS — Z91.048 ALLERGY TO MOLD: ICD-10-CM

## 2025-03-24 DIAGNOSIS — J30.1 SEASONAL ALLERGIC RHINITIS DUE TO POLLEN: Primary | ICD-10-CM

## 2025-03-24 PROCEDURE — 95117 IMMUNOTHERAPY INJECTIONS: CPT | Performed by: NURSE PRACTITIONER

## 2025-03-26 ENCOUNTER — CLINICAL SUPPORT (OUTPATIENT)
Dept: ALLERGY | Age: 80
End: 2025-03-26
Payer: MEDICARE

## 2025-03-26 VITALS
SYSTOLIC BLOOD PRESSURE: 120 MMHG | DIASTOLIC BLOOD PRESSURE: 74 MMHG | OXYGEN SATURATION: 95 % | HEART RATE: 90 BPM | RESPIRATION RATE: 18 BRPM

## 2025-03-26 DIAGNOSIS — J30.1 SEASONAL ALLERGIC RHINITIS DUE TO POLLEN: Primary | ICD-10-CM

## 2025-03-26 DIAGNOSIS — J30.9 CHRONIC ALLERGIC RHINITIS: ICD-10-CM

## 2025-03-26 PROCEDURE — 95117 IMMUNOTHERAPY INJECTIONS: CPT | Performed by: NURSE PRACTITIONER

## 2025-03-26 NOTE — PROGRESS NOTES
PATIENT HAS THE FOLLOWING DIAGNOSIS SUPPORTING ADMINISTRATION OF ALLERGY INJECTIONS: J30.1Allergic rhinitis due to pollen  AND 60025 MULTIPLE ALLERGY INJECTIONS FOR ALLERGY INJECTION ADMINISTRATION      Patient here for allergy injection today.  Patient was presented with the opportunity to speak with provider and ask questions regarding allergy injections.  Patient was also instructed they need to wait 30 minutes after receiving allergy injections. All risks associated with potential adverse effects have been explained to patient and patient handout was provided following allergy testing.    After consent obtained/verified, allergy injection subcutaneously given in back of arm(s).  Please see below for specific details of site injections, concentration of serum, and volume injected.    VIAL COLOR OF ALL VIALS TODAY IS silver 1:10,000. Vial allergy w/v concentration today is: 1:10,000     ALLERGY INJECTION FROM VIAL A GIVEN right  UPPER ARM IN THE AMOUNT OF 0.50 ML    ALLERGY INJECTION FROM VIAL B GIVEN left upper ARM IN THE AMOUNT OF 0.50  ML    ALLERGY INJECTION FROM VIAL C GIVEN leftlower ARM IN THE AMOUNT OF 0.50 ML      Documentation of vial injection specific to arm(s) noted on Allergy Immunotherapy Administration Form.       Patient waited 30 minutes for observation.yes   Patient has received information and verbalizes understanding of the potential  health risks associated with allergy injections . Patient understands risks including anaphylaxis and chooses not to wait 30 minutes after administration of allergy injection(s).    Patient tolerated well without adverse reaction while the patient was in the office.    SHOT REACTION TREATMENT INSTRUCTIONS    During the 30 minute wait after an allergy injection the following symptoms should be reported:    Itching other than at the injection site  Hives or swelling other than at the injection site  Redness other than at the injection site  Difficulty

## 2025-03-31 ENCOUNTER — CLINICAL SUPPORT (OUTPATIENT)
Dept: ALLERGY | Age: 80
End: 2025-03-31
Payer: MEDICARE

## 2025-03-31 VITALS
RESPIRATION RATE: 18 BRPM | SYSTOLIC BLOOD PRESSURE: 144 MMHG | DIASTOLIC BLOOD PRESSURE: 99 MMHG | HEART RATE: 91 BPM | OXYGEN SATURATION: 99 %

## 2025-03-31 DIAGNOSIS — J30.9 CHRONIC ALLERGIC RHINITIS: ICD-10-CM

## 2025-03-31 DIAGNOSIS — J30.1 SEASONAL ALLERGIC RHINITIS DUE TO POLLEN: Primary | ICD-10-CM

## 2025-03-31 PROCEDURE — 95117 IMMUNOTHERAPY INJECTIONS: CPT | Performed by: NURSE PRACTITIONER

## 2025-03-31 NOTE — PROGRESS NOTES
PATIENT HAS THE FOLLOWING DIAGNOSIS SUPPORTING ADMINISTRATION OF ALLERGY INJECTIONS: J30.1Allergic rhinitis due to pollen  AND 87855 MULTIPLE ALLERGY INJECTIONS FOR ALLERGY INJECTION ADMINISTRATION      Patient here for allergy injection today.  Patient was presented with the opportunity to speak with provider and ask questions regarding allergy injections.  Patient was also instructed they need to wait 30 minutes after receiving allergy injections. All risks associated with potential adverse effects have been explained to patient and patient handout was provided following allergy testing.    After consent obtained/verified, allergy injection subcutaneously given in back of arm(s).  Please see below for specific details of site injections, concentration of serum, and volume injected.    VIAL COLOR OF ALL VIALS TODAY IS green 1:1,000. Vial allergy w/v concentration today is: 1:1,000     ALLERGY INJECTION FROM VIAL A GIVEN left  UPPER ARM IN THE AMOUNT OF 0.05 ML    ALLERGY INJECTION FROM VIAL B GIVEN right lower ARM IN THE AMOUNT OF 0.05  ML    ALLERGY INJECTION FROM VIAL C GIVEN rightupper ARM IN THE AMOUNT OF 0.05 ML      Documentation of vial injection specific to arm(s) noted on Allergy Immunotherapy Administration Form.       Patient waited 30 minutes for observation.No  Patient has received information and verbalizes understanding of the potential  health risks associated with allergy injections . Patient understands risks including anaphylaxis and chooses not to wait 30 minutes after administration of allergy injection(s).    Patient tolerated well without adverse reaction while the patient was in the office.    SHOT REACTION TREATMENT INSTRUCTIONS    During the 30 minute wait after an allergy injection the following symptoms should be reported:    Itching other than at the injection site  Hives or swelling other than at the injection site  Redness other than at the injection site  Difficulty breathing  Chest

## 2025-04-02 ENCOUNTER — CLINICAL SUPPORT (OUTPATIENT)
Dept: ALLERGY | Age: 80
End: 2025-04-02
Payer: MEDICARE

## 2025-04-02 VITALS — SYSTOLIC BLOOD PRESSURE: 122 MMHG | DIASTOLIC BLOOD PRESSURE: 80 MMHG | HEART RATE: 82 BPM | OXYGEN SATURATION: 95 %

## 2025-04-02 DIAGNOSIS — J30.1 SEASONAL ALLERGIC RHINITIS DUE TO POLLEN: Primary | ICD-10-CM

## 2025-04-02 DIAGNOSIS — J30.9 CHRONIC ALLERGIC RHINITIS: ICD-10-CM

## 2025-04-02 PROCEDURE — 95117 IMMUNOTHERAPY INJECTIONS: CPT | Performed by: NURSE PRACTITIONER

## 2025-04-02 NOTE — PROGRESS NOTES
PATIENT HAS THE FOLLOWING DIAGNOSIS SUPPORTING ADMINISTRATION OF ALLERGY INJECTIONS: J30.1Allergic rhinitis due to pollen  AND 08031 MULTIPLE ALLERGY INJECTIONS FOR ALLERGY INJECTION ADMINISTRATION      Patient here for allergy injection today.  Patient was presented with the opportunity to speak with provider and ask questions regarding allergy injections.  Patient was also instructed they need to wait 30 minutes after receiving allergy injections. All risks associated with potential adverse effects have been explained to patient and patient handout was provided following allergy testing.    After consent obtained/verified, allergy injection subcutaneously given in back of arm(s).  Please see below for specific details of site injections, concentration of serum, and volume injected.    VIAL COLOR OF ALL VIALS TODAY IS green 1:1,000. Vial allergy w/v concentration today is: 1:1,000     ALLERGY INJECTION FROM VIAL A GIVEN right  UPPER ARM IN THE AMOUNT OF 0.10 ML    ALLERGY INJECTION FROM VIAL B GIVEN left lower ARM IN THE AMOUNT OF 0.10  ML    ALLERGY INJECTION FROM VIAL C GIVEN leftupper ARM IN THE AMOUNT OF 0.10 ML      Documentation of vial injection specific to arm(s) noted on Allergy Immunotherapy Administration Form.       Patient waited 30 minutes for observation.No  Patient has received information and verbalizes understanding of the potential  health risks associated with allergy injections . Patient understands risks including anaphylaxis and chooses not to wait 30 minutes after administration of allergy injection(s).    Patient tolerated well without adverse reaction while the patient was in the office.    SHOT REACTION TREATMENT INSTRUCTIONS    During the 30 minute wait after an allergy injection the following symptoms should be reported:    Itching other than at the injection site  Hives or swelling other than at the injection site  Redness other than at the injection site  Difficulty breathing  Chest

## 2025-04-04 ENCOUNTER — OFFICE VISIT (OUTPATIENT)
Dept: FAMILY MEDICINE CLINIC | Age: 80
End: 2025-04-04

## 2025-04-04 VITALS
TEMPERATURE: 97.6 F | RESPIRATION RATE: 16 BRPM | BODY MASS INDEX: 37.47 KG/M2 | WEIGHT: 247.2 LBS | HEIGHT: 68 IN | SYSTOLIC BLOOD PRESSURE: 128 MMHG | HEART RATE: 76 BPM | DIASTOLIC BLOOD PRESSURE: 68 MMHG

## 2025-04-04 DIAGNOSIS — M54.10 RADICULOPATHY OF ARM: Primary | ICD-10-CM

## 2025-04-04 DIAGNOSIS — M54.2 NECK PAIN: ICD-10-CM

## 2025-04-04 DIAGNOSIS — M25.511 RIGHT SHOULDER PAIN, UNSPECIFIED CHRONICITY: ICD-10-CM

## 2025-04-04 DIAGNOSIS — B96.89 ACUTE BACTERIAL SINUSITIS: ICD-10-CM

## 2025-04-04 DIAGNOSIS — M47.812 SPONDYLOSIS OF CERVICAL SPINE: ICD-10-CM

## 2025-04-04 DIAGNOSIS — J01.90 ACUTE BACTERIAL SINUSITIS: ICD-10-CM

## 2025-04-04 RX ORDER — CEFDINIR 300 MG/1
300 CAPSULE ORAL 2 TIMES DAILY
Qty: 20 CAPSULE | Refills: 0 | Status: SHIPPED | OUTPATIENT
Start: 2025-04-04 | End: 2025-04-14

## 2025-04-04 ASSESSMENT — ENCOUNTER SYMPTOMS
SHORTNESS OF BREATH: 0
DIARRHEA: 0
BLOOD IN STOOL: 0
VOMITING: 0
NAUSEA: 0
SINUS PAIN: 1
CONSTIPATION: 0

## 2025-04-04 NOTE — PROGRESS NOTES
Genitourinary:  Negative for dysuria and hematuria.   Musculoskeletal:  Positive for arthralgias (right shoulder). Negative for myalgias.   Neurological:  Positive for weakness (right arm) and numbness (right arm). Negative for dizziness and headaches.   All other systems reviewed and are negative.    OBJECTIVE     /68 (BP Site: Left Upper Arm)   Pulse 76   Temp 97.6 °F (36.4 °C) (Oral)   Resp 16   Ht 1.715 m (5' 7.5\")   Wt 112.1 kg (247 lb 3.2 oz)   LMP 02/15/1995   BMI 38.15 kg/m²     Physical Exam  Vitals and nursing note reviewed.   Constitutional:       Appearance: She is well-developed. She is obese.   HENT:      Head: Normocephalic and atraumatic.      Right Ear: External ear normal.      Left Ear: External ear normal.      Nose: Congestion present.      Left Sinus: Maxillary sinus tenderness and frontal sinus tenderness present.   Eyes:      Conjunctiva/sclera: Conjunctivae normal.      Pupils: Pupils are equal, round, and reactive to light.   Cardiovascular:      Rate and Rhythm: Normal rate and regular rhythm.      Heart sounds: Normal heart sounds.   Pulmonary:      Effort: Pulmonary effort is normal.      Breath sounds: Normal breath sounds.   Abdominal:      General: Bowel sounds are normal.      Palpations: Abdomen is soft.   Musculoskeletal:      Right shoulder: Tenderness present. Decreased range of motion.      Right hand: Decreased strength.      Cervical back: Normal range of motion and neck supple.   Skin:     General: Skin is warm and dry.   Neurological:      Mental Status: She is alert and oriented to person, place, and time.      Deep Tendon Reflexes: Reflexes are normal and symmetric.   Psychiatric:         Behavior: Behavior normal.         Thought Content: Thought content normal.         Judgment: Judgment normal.           No results found for this visit on 04/04/25.      ASSESSMENT       Diagnosis Orders   1. Radiculopathy of arm  Amb External Referral To Physical Therapy

## 2025-04-07 ENCOUNTER — CLINICAL SUPPORT (OUTPATIENT)
Dept: ALLERGY | Age: 80
End: 2025-04-07
Payer: MEDICARE

## 2025-04-07 VITALS
DIASTOLIC BLOOD PRESSURE: 57 MMHG | HEART RATE: 95 BPM | RESPIRATION RATE: 18 BRPM | SYSTOLIC BLOOD PRESSURE: 125 MMHG | OXYGEN SATURATION: 98 %

## 2025-04-07 DIAGNOSIS — J30.1 SEASONAL ALLERGIC RHINITIS DUE TO POLLEN: Primary | ICD-10-CM

## 2025-04-07 DIAGNOSIS — J30.9 CHRONIC ALLERGIC RHINITIS: ICD-10-CM

## 2025-04-07 PROCEDURE — 95117 IMMUNOTHERAPY INJECTIONS: CPT | Performed by: NURSE PRACTITIONER

## 2025-04-09 ENCOUNTER — CLINICAL SUPPORT (OUTPATIENT)
Dept: ALLERGY | Age: 80
End: 2025-04-09
Payer: MEDICARE

## 2025-04-09 VITALS
OXYGEN SATURATION: 93 % | SYSTOLIC BLOOD PRESSURE: 100 MMHG | HEART RATE: 74 BPM | RESPIRATION RATE: 16 BRPM | DIASTOLIC BLOOD PRESSURE: 68 MMHG

## 2025-04-09 DIAGNOSIS — J30.1 SEASONAL ALLERGIC RHINITIS DUE TO POLLEN: Primary | ICD-10-CM

## 2025-04-09 DIAGNOSIS — J30.9 CHRONIC ALLERGIC RHINITIS: ICD-10-CM

## 2025-04-09 PROCEDURE — 95117 IMMUNOTHERAPY INJECTIONS: CPT | Performed by: NURSE PRACTITIONER

## 2025-04-09 NOTE — PROGRESS NOTES
PATIENT HAS THE FOLLOWING DIAGNOSIS SUPPORTING ADMINISTRATION OF ALLERGY INJECTIONS: J30.1Allergic rhinitis due to pollen  AND [id number] MULTIPLE ALLERGY INJECTIONS FOR ALLERGY INJECTION ADMINISTRATION      Patient here for allergy injection today.  Patient was presented with the opportunity to speak with provider and ask questions regarding allergy injections.  Patient was also instructed they need to wait 30 minutes after receiving allergy injections. All risks associated with potential adverse effects have been explained to patient and patient handout was provided following allergy testing.    After consent obtained/verified, allergy injection subcutaneously given in back of arm(s).  Please see below for specific details of site injections, concentration of serum, and volume injected.    VIAL COLOR OF ALL VIALS TODAY IS green 1:1,000. Vial allergy w/v concentration today is: 1:1,000     ALLERGY INJECTION FROM VIAL A GIVEN right  UPPER ARM IN THE AMOUNT OF 0.20 ML    ALLERGY INJECTION FROM VIAL B GIVEN left upper ARM IN THE AMOUNT OF 0.20  ML    ALLERGY INJECTION FROM VIAL C GIVEN leftlower ARM IN THE AMOUNT OF 0.20 ML      Documentation of vial injection specific to arm(s) noted on Allergy Immunotherapy Administration Form.       Patient waited 30 minutes for observation.No  Patient has received information and verbalizes understanding of the potential  health risks associated with allergy injections . Patient understands risks including anaphylaxis and chooses not to wait 30 minutes after administration of allergy injection(s).    Patient tolerated well without adverse reaction while the patient was in the office.    SHOT REACTION TREATMENT INSTRUCTIONS    During the 30 minute wait after an allergy injection the following symptoms should be reported:    Itching other than at the injection site  Hives or swelling other than at the injection site  Redness other than at the injection site  Difficulty breathing  Chest

## 2025-04-14 ENCOUNTER — CLINICAL SUPPORT (OUTPATIENT)
Dept: ALLERGY | Age: 80
End: 2025-04-14
Payer: MEDICARE

## 2025-04-14 VITALS
RESPIRATION RATE: 18 BRPM | OXYGEN SATURATION: 94 % | HEART RATE: 89 BPM | DIASTOLIC BLOOD PRESSURE: 70 MMHG | SYSTOLIC BLOOD PRESSURE: 119 MMHG

## 2025-04-14 DIAGNOSIS — J30.1 SEASONAL ALLERGIC RHINITIS DUE TO POLLEN: Primary | ICD-10-CM

## 2025-04-14 DIAGNOSIS — J30.9 CHRONIC ALLERGIC RHINITIS: ICD-10-CM

## 2025-04-14 PROCEDURE — 95117 IMMUNOTHERAPY INJECTIONS: CPT | Performed by: NURSE PRACTITIONER

## 2025-04-14 NOTE — PROGRESS NOTES
PATIENT HAS THE FOLLOWING DIAGNOSIS SUPPORTING ADMINISTRATION OF ALLERGY INJECTIONS: J30.1Allergic rhinitis due to pollen  AND 21559 MULTIPLE ALLERGY INJECTIONS FOR ALLERGY INJECTION ADMINISTRATION      Patient here for allergy injection today.  Patient was presented with the opportunity to speak with provider and ask questions regarding allergy injections.  Patient was also instructed they need to wait 30 minutes after receiving allergy injections. All risks associated with potential adverse effects have been explained to patient and patient handout was provided following allergy testing.    After consent obtained/verified, allergy injection subcutaneously given in back of arm(s).  Please see below for specific details of site injections, concentration of serum, and volume injected.    VIAL COLOR OF ALL VIALS TODAY IS green 1:1,000. Vial allergy w/v concentration today is: 1:1,000     ALLERGY INJECTION FROM VIAL A GIVEN left  UPPER ARM IN THE AMOUNT OF 0.25 ML    ALLERGY INJECTION FROM VIAL B GIVEN right lower ARM IN THE AMOUNT OF 0.25  ML    ALLERGY INJECTION FROM VIAL C GIVEN rightupper ARM IN THE AMOUNT OF 0.25 ML      Documentation of vial injection specific to arm(s) noted on Allergy Immunotherapy Administration Form.       Patient waited 30 minutes for observation.No  Patient has received information and verbalizes understanding of the potential  health risks associated with allergy injections . Patient understands risks including anaphylaxis and chooses not to wait 30 minutes after administration of allergy injection(s).    Patient tolerated well without adverse reaction while the patient was in the office.    SHOT REACTION TREATMENT INSTRUCTIONS    During the 30 minute wait after an allergy injection the following symptoms should be reported:    Itching other than at the injection site  Hives or swelling other than at the injection site  Redness other than at the injection site  Difficulty breathing  Chest

## 2025-04-16 ENCOUNTER — CLINICAL SUPPORT (OUTPATIENT)
Dept: ALLERGY | Age: 80
End: 2025-04-16

## 2025-04-16 VITALS
RESPIRATION RATE: 18 BRPM | HEART RATE: 79 BPM | SYSTOLIC BLOOD PRESSURE: 131 MMHG | OXYGEN SATURATION: 99 % | DIASTOLIC BLOOD PRESSURE: 79 MMHG

## 2025-04-16 DIAGNOSIS — J30.1 SEASONAL ALLERGIC RHINITIS DUE TO POLLEN: Primary | ICD-10-CM

## 2025-04-16 DIAGNOSIS — J30.9 CHRONIC ALLERGIC RHINITIS: ICD-10-CM

## 2025-04-16 NOTE — PROGRESS NOTES
PATIENT HAS THE FOLLOWING DIAGNOSIS SUPPORTING ADMINISTRATION OF ALLERGY INJECTIONS: J30.1Allergic rhinitis due to pollen  AND [id number] MULTIPLE ALLERGY INJECTIONS FOR ALLERGY INJECTION ADMINISTRATION      Patient here for allergy injection today.  Patient was presented with the opportunity to speak with provider and ask questions regarding allergy injections.  Patient was also instructed they need to wait 30 minutes after receiving allergy injections. All risks associated with potential adverse effects have been explained to patient and patient handout was provided following allergy testing.    After consent obtained/verified, allergy injection subcutaneously given in back of arm(s).  Please see below for specific details of site injections, concentration of serum, and volume injected.    VIAL COLOR OF ALL VIALS TODAY IS green 1:1,000. Vial allergy w/v concentration today is: 1:1,000     ALLERGY INJECTION FROM VIAL A GIVEN right  UPPER ARM IN THE AMOUNT OF 0.30 ML    ALLERGY INJECTION FROM VIAL B GIVEN left lower ARM IN THE AMOUNT OF 0.30  ML    ALLERGY INJECTION FROM VIAL C GIVEN leftupper ARM IN THE AMOUNT OF 0.30 ML      Documentation of vial injection specific to arm(s) noted on Allergy Immunotherapy Administration Form.       Patient waited 30 minutes for observation.No  Patient has received information and verbalizes understanding of the potential  health risks associated with allergy injections . Patient understands risks including anaphylaxis and chooses not to wait 30 minutes after administration of allergy injection(s).    Patient tolerated well without adverse reaction while the patient was in the office.    SHOT REACTION TREATMENT INSTRUCTIONS    During the 30 minute wait after an allergy injection the following symptoms should be reported:    Itching other than at the injection site  Hives or swelling other than at the injection site  Redness other than at the injection site  Difficulty breathing  Chest

## 2025-04-18 ENCOUNTER — PATIENT MESSAGE (OUTPATIENT)
Dept: FAMILY MEDICINE CLINIC | Age: 80
End: 2025-04-18

## 2025-04-18 RX ORDER — TRAZODONE HYDROCHLORIDE 50 MG/1
25-50 TABLET ORAL NIGHTLY PRN
Qty: 30 TABLET | Refills: 0 | Status: SHIPPED | OUTPATIENT
Start: 2025-04-18

## 2025-04-18 NOTE — TELEPHONE ENCOUNTER
Alicia,     I am sorry there was an issue with [facility] receiving the physical therapy order.  I will make sure that that gets resent.     I went ahead and sent a low-dose of trazodone to the pharmacy for you.  Please update me next week on how it is doing.     Thanks,  Josie    This [employer] message has not been read.     Viola Daly, [facility]  Eva Orellana \"Alicia\"1 hour ago (8:18 AM)     TD  I refaxed that referral to [facility]. Ill forward this message to Josie    This [employer] message has not been read.     Eva Orellana \"Alicia\"  [facility] [employer] [facility] [employer] [facility] Clinical Staff (supporting You)2 hours ago (7:18 AM)       On my last appointment we dIscussed my shoulder pain and arm numbness.  We decided to do therapy at [facility].  [facility] has not received the order for therapy and in the meantime the issues haVE gotten worse.  I have an appointment on the 28th with Dr Alvarado at [facility].  The discomfort is interupting my sleep and Tylenol is not covering it.  Could I have a light sleeping pill until I get this settled?  ([facility])     HAPPY SAADIA ORELLANA

## 2025-04-21 ENCOUNTER — CLINICAL SUPPORT (OUTPATIENT)
Dept: ALLERGY | Age: 80
End: 2025-04-21
Payer: MEDICARE

## 2025-04-21 VITALS
SYSTOLIC BLOOD PRESSURE: 129 MMHG | OXYGEN SATURATION: 98 % | HEART RATE: 84 BPM | DIASTOLIC BLOOD PRESSURE: 84 MMHG | RESPIRATION RATE: 18 BRPM

## 2025-04-21 DIAGNOSIS — J30.9 CHRONIC ALLERGIC RHINITIS: ICD-10-CM

## 2025-04-21 DIAGNOSIS — J30.1 SEASONAL ALLERGIC RHINITIS DUE TO POLLEN: Primary | ICD-10-CM

## 2025-04-21 PROCEDURE — 95117 IMMUNOTHERAPY INJECTIONS: CPT | Performed by: NURSE PRACTITIONER

## 2025-04-21 NOTE — PROGRESS NOTES

## 2025-04-22 ENCOUNTER — PATIENT MESSAGE (OUTPATIENT)
Dept: FAMILY MEDICINE CLINIC | Age: 80
End: 2025-04-22

## 2025-04-23 ENCOUNTER — CLINICAL SUPPORT (OUTPATIENT)
Dept: ALLERGY | Age: 80
End: 2025-04-23

## 2025-04-23 VITALS
RESPIRATION RATE: 18 BRPM | OXYGEN SATURATION: 96 % | SYSTOLIC BLOOD PRESSURE: 127 MMHG | HEART RATE: 80 BPM | DIASTOLIC BLOOD PRESSURE: 81 MMHG

## 2025-04-23 DIAGNOSIS — J30.9 CHRONIC ALLERGIC RHINITIS: ICD-10-CM

## 2025-04-23 DIAGNOSIS — J30.1 SEASONAL ALLERGIC RHINITIS DUE TO POLLEN: Primary | ICD-10-CM

## 2025-04-23 RX ORDER — NYSTATIN 100000 U/G
CREAM TOPICAL
Qty: 30 G | Refills: 2 | Status: SHIPPED | OUTPATIENT
Start: 2025-04-23

## 2025-04-23 NOTE — PROGRESS NOTES
PATIENT HAS THE FOLLOWING DIAGNOSIS SUPPORTING ADMINISTRATION OF ALLERGY INJECTIONS: J30.1Allergic rhinitis due to pollen  AND [id number] MULTIPLE ALLERGY INJECTIONS FOR ALLERGY INJECTION ADMINISTRATION      Patient here for allergy injection today.  Patient was presented with the opportunity to speak with provider and ask questions regarding allergy injections.  Patient was also instructed they need to wait 30 minutes after receiving allergy injections. All risks associated with potential adverse effects have been explained to patient and patient handout was provided following allergy testing.    After consent obtained/verified, allergy injection subcutaneously given in back of arm(s).  Please see below for specific details of site injections, concentration of serum, and volume injected.    VIAL COLOR OF ALL VIALS TODAY IS green 1:1,000. Vial allergy w/v concentration today is: 1:1,000     ALLERGY INJECTION FROM VIAL A GIVEN right  UPPER ARM IN THE AMOUNT OF 0.40 ML    ALLERGY INJECTION FROM VIAL B GIVEN left upper ARM IN THE AMOUNT OF 0.40  ML    ALLERGY INJECTION FROM VIAL C GIVEN leftlower ARM IN THE AMOUNT OF 0.40 ML      Documentation of vial injection specific to arm(s) noted on Allergy Immunotherapy Administration Form.       Patient waited 30 minutes for observation.No  Patient has received information and verbalizes understanding of the potential  health risks associated with allergy injections . Patient understands risks including anaphylaxis and chooses not to wait 30 minutes after administration of allergy injection(s).    Patient tolerated well without adverse reaction while the patient was in the office.    SHOT REACTION TREATMENT INSTRUCTIONS    During the 30 minute wait after an allergy injection the following symptoms should be reported:    Itching other than at the injection site  Hives or swelling other than at the injection site  Redness other than at the injection site  Difficulty breathing  Chest

## 2025-04-28 ENCOUNTER — CLINICAL SUPPORT (OUTPATIENT)
Dept: ALLERGY | Age: 80
End: 2025-04-28
Payer: MEDICARE

## 2025-04-28 VITALS
OXYGEN SATURATION: 98 % | RESPIRATION RATE: 18 BRPM | HEART RATE: 84 BPM | DIASTOLIC BLOOD PRESSURE: 85 MMHG | SYSTOLIC BLOOD PRESSURE: 133 MMHG

## 2025-04-28 DIAGNOSIS — J30.9 CHRONIC ALLERGIC RHINITIS: ICD-10-CM

## 2025-04-28 DIAGNOSIS — J30.1 SEASONAL ALLERGIC RHINITIS DUE TO POLLEN: Primary | ICD-10-CM

## 2025-04-28 PROCEDURE — 95117 IMMUNOTHERAPY INJECTIONS: CPT | Performed by: NURSE PRACTITIONER

## 2025-04-28 NOTE — PROGRESS NOTES
PATIENT HAS THE FOLLOWING DIAGNOSIS SUPPORTING ADMINISTRATION OF ALLERGY INJECTIONS: J30.1Allergic rhinitis due to pollen  AND 35331 MULTIPLE ALLERGY INJECTIONS FOR ALLERGY INJECTION ADMINISTRATION      Patient here for allergy injection today.  Patient was presented with the opportunity to speak with provider and ask questions regarding allergy injections.  Patient was also instructed they need to wait 30 minutes after receiving allergy injections. All risks associated with potential adverse effects have been explained to patient and patient handout was provided following allergy testing.    After consent obtained/verified, allergy injection subcutaneously given in back of arm(s).  Please see below for specific details of site injections, concentration of serum, and volume injected.    VIAL COLOR OF ALL VIALS TODAY IS green 1:1,000. Vial allergy w/v concentration today is: 1:1,000     ALLERGY INJECTION FROM VIAL A GIVEN left  UPPER ARM IN THE AMOUNT OF 0.45 ML    ALLERGY INJECTION FROM VIAL B GIVEN right lower ARM IN THE AMOUNT OF 0.45  ML    ALLERGY INJECTION FROM VIAL C GIVEN rightupper ARM IN THE AMOUNT OF 0.45 ML      Documentation of vial injection specific to arm(s) noted on Allergy Immunotherapy Administration Form.       Patient waited 30 minutes for observation.No  Patient has received information and verbalizes understanding of the potential  health risks associated with allergy injections . Patient understands risks including anaphylaxis and chooses not to wait 30 minutes after administration of allergy injection(s).    Patient tolerated well without adverse reaction while the patient was in the office.    SHOT REACTION TREATMENT INSTRUCTIONS    During the 30 minute wait after an allergy injection the following symptoms should be reported:    Itching other than at the injection site  Hives or swelling other than at the injection site  Redness other than at the injection site  Difficulty breathing  Chest

## 2025-04-30 ENCOUNTER — CLINICAL SUPPORT (OUTPATIENT)
Dept: ALLERGY | Age: 80
End: 2025-04-30

## 2025-04-30 VITALS
SYSTOLIC BLOOD PRESSURE: 118 MMHG | RESPIRATION RATE: 16 BRPM | OXYGEN SATURATION: 97 % | HEART RATE: 68 BPM | DIASTOLIC BLOOD PRESSURE: 78 MMHG

## 2025-04-30 DIAGNOSIS — J30.1 SEASONAL ALLERGIC RHINITIS DUE TO POLLEN: Primary | ICD-10-CM

## 2025-04-30 DIAGNOSIS — J30.9 CHRONIC ALLERGIC RHINITIS: ICD-10-CM

## 2025-04-30 RX ORDER — MELOXICAM 15 MG/1
TABLET ORAL
COMMUNITY
Start: 2025-04-28

## 2025-04-30 NOTE — PROGRESS NOTES
PATIENT HAS THE FOLLOWING DIAGNOSIS SUPPORTING ADMINISTRATION OF ALLERGY INJECTIONS: J30.1Allergic rhinitis due to pollen  AND 86367 MULTIPLE ALLERGY INJECTIONS FOR ALLERGY INJECTION ADMINISTRATION      Patient here for allergy injection today.  Patient was presented with the opportunity to speak with provider and ask questions regarding allergy injections.  Patient was also instructed they need to wait 30 minutes after receiving allergy injections. All risks associated with potential adverse effects have been explained to patient and patient handout was provided following allergy testing.    After consent obtained/verified, allergy injection subcutaneously given in back of arm(s).  Please see below for specific details of site injections, concentration of serum, and volume injected.    VIAL COLOR OF ALL VIALS TODAY IS green 1:1,000. Vial allergy w/v concentration today is: 1:1,000     ALLERGY INJECTION FROM VIAL A GIVEN right  UPPER ARM IN THE AMOUNT OF 0.50 ML    ALLERGY INJECTION FROM VIAL B GIVEN left lower ARM IN THE AMOUNT OF 0.50  ML    ALLERGY INJECTION FROM VIAL C GIVEN leftupper ARM IN THE AMOUNT OF 0.50 ML      Documentation of vial injection specific to arm(s) noted on Allergy Immunotherapy Administration Form.       Patient waited 30 minutes for observation.No  Patient has received information and verbalizes understanding of the potential  health risks associated with allergy injections . Patient understands risks including anaphylaxis and chooses not to wait 30 minutes after administration of allergy injection(s).    Patient tolerated well without adverse reaction while the patient was in the office.    SHOT REACTION TREATMENT INSTRUCTIONS    During the 30 minute wait after an allergy injection the following symptoms should be reported:    Itching other than at the injection site  Hives or swelling other than at the injection site  Redness other than at the injection site  Difficulty breathing  Chest

## 2025-05-05 ENCOUNTER — CLINICAL SUPPORT (OUTPATIENT)
Dept: ALLERGY | Age: 80
End: 2025-05-05
Payer: MEDICARE

## 2025-05-05 VITALS
HEART RATE: 83 BPM | SYSTOLIC BLOOD PRESSURE: 130 MMHG | RESPIRATION RATE: 18 BRPM | OXYGEN SATURATION: 96 % | DIASTOLIC BLOOD PRESSURE: 71 MMHG

## 2025-05-05 DIAGNOSIS — J30.1 SEASONAL ALLERGIC RHINITIS DUE TO POLLEN: Primary | ICD-10-CM

## 2025-05-05 DIAGNOSIS — J30.9 CHRONIC ALLERGIC RHINITIS: ICD-10-CM

## 2025-05-05 PROCEDURE — 95117 IMMUNOTHERAPY INJECTIONS: CPT | Performed by: NURSE PRACTITIONER

## 2025-05-05 NOTE — PROGRESS NOTES
PATIENT HAS THE FOLLOWING DIAGNOSIS SUPPORTING ADMINISTRATION OF ALLERGY INJECTIONS: J30.1Allergic rhinitis due to pollen  AND 39382 MULTIPLE ALLERGY INJECTIONS FOR ALLERGY INJECTION ADMINISTRATION      Patient here for allergy injection today.  Patient was presented with the opportunity to speak with provider and ask questions regarding allergy injections.  Patient was also instructed they need to wait 30 minutes after receiving allergy injections. All risks associated with potential adverse effects have been explained to patient and patient handout was provided following allergy testing.    After consent obtained/verified, allergy injection subcutaneously given in back of arm(s).  Please see below for specific details of site injections, concentration of serum, and volume injected.    VIAL COLOR OF ALL VIALS TODAY IS blue 1:100. Vial allergy w/v concentration today is: 1:100     ALLERGY INJECTION FROM VIAL A GIVEN left  UPPER ARM IN THE AMOUNT OF 0.05 ML    ALLERGY INJECTION FROM VIAL B GIVEN right upper ARM IN THE AMOUNT OF 0.05  ML    ALLERGY INJECTION FROM VIAL C GIVEN rightlower ARM IN THE AMOUNT OF 0.05 ML      Documentation of vial injection specific to arm(s) noted on Allergy Immunotherapy Administration Form.       Patient waited 30 minutes for observation.No  Patient has received information and verbalizes understanding of the potential  health risks associated with allergy injections . Patient understands risks including anaphylaxis and chooses not to wait 30 minutes after administration of allergy injection(s).    Patient tolerated well without adverse reaction while the patient was in the office.    SHOT REACTION TREATMENT INSTRUCTIONS    During the 30 minute wait after an allergy injection the following symptoms should be reported:    Itching other than at the injection site  Hives or swelling other than at the injection site  Redness other than at the injection site  Difficulty breathing  Chest

## 2025-05-07 ENCOUNTER — CLINICAL SUPPORT (OUTPATIENT)
Dept: ALLERGY | Age: 80
End: 2025-05-07
Payer: MEDICARE

## 2025-05-07 VITALS
HEART RATE: 66 BPM | OXYGEN SATURATION: 98 % | SYSTOLIC BLOOD PRESSURE: 131 MMHG | RESPIRATION RATE: 18 BRPM | DIASTOLIC BLOOD PRESSURE: 85 MMHG

## 2025-05-07 DIAGNOSIS — J30.1 SEASONAL ALLERGIC RHINITIS DUE TO POLLEN: Primary | ICD-10-CM

## 2025-05-07 DIAGNOSIS — J30.9 CHRONIC ALLERGIC RHINITIS: ICD-10-CM

## 2025-05-07 PROCEDURE — 95117 IMMUNOTHERAPY INJECTIONS: CPT | Performed by: NURSE PRACTITIONER

## 2025-05-07 NOTE — PROGRESS NOTES
PATIENT HAS THE FOLLOWING DIAGNOSIS SUPPORTING ADMINISTRATION OF ALLERGY INJECTIONS: J30.1Allergic rhinitis due to pollen  AND 51391 MULTIPLE ALLERGY INJECTIONS FOR ALLERGY INJECTION ADMINISTRATION      Patient here for allergy injection today.  Patient was presented with the opportunity to speak with provider and ask questions regarding allergy injections.  Patient was also instructed they need to wait 30 minutes after receiving allergy injections. All risks associated with potential adverse effects have been explained to patient and patient handout was provided following allergy testing.    After consent obtained/verified, allergy injection subcutaneously given in back of arm(s).  Please see below for specific details of site injections, concentration of serum, and volume injected.    VIAL COLOR OF ALL VIALS TODAY IS blue 1:100. Vial allergy w/v concentration today is: 1:100     ALLERGY INJECTION FROM VIAL A GIVEN right  UPPER ARM IN THE AMOUNT OF 0.10 ML    ALLERGY INJECTION FROM VIAL B GIVEN left upper ARM IN THE AMOUNT OF 0.10  ML    ALLERGY INJECTION FROM VIAL C GIVEN leftlower ARM IN THE AMOUNT OF 0.10 ML      Documentation of vial injection specific to arm(s) noted on Allergy Immunotherapy Administration Form.       Patient waited 30 minutes for observation.No  Patient has received information and verbalizes understanding of the potential  health risks associated with allergy injections . Patient understands risks including anaphylaxis and chooses not to wait 30 minutes after administration of allergy injection(s).    Patient tolerated well without adverse reaction while the patient was in the office.    SHOT REACTION TREATMENT INSTRUCTIONS    During the 30 minute wait after an allergy injection the following symptoms should be reported:    Itching other than at the injection site  Hives or swelling other than at the injection site  Redness other than at the injection site  Difficulty breathing  Chest

## 2025-05-12 RX ORDER — TRAZODONE HYDROCHLORIDE 50 MG/1
TABLET ORAL
Qty: 90 TABLET | Refills: 1 | Status: SHIPPED | OUTPATIENT
Start: 2025-05-12

## 2025-05-12 NOTE — TELEPHONE ENCOUNTER
Eva Orellana called requesting a refill on the following medications:  Requested Prescriptions     Pending Prescriptions Disp Refills    traZODone (DESYREL) 50 MG tablet [Pharmacy Med Name: TRAZODONE 50 MG TABLET] 90 tablet 1     Sig: TAKE 1/2 TO 1 TABLET BY MOUTH NIGHTLY AS NEEDED FOR SLEEP       Date of last visit: 4/4/2025  Date of next visit (if applicable):5/19/2025  Date of last refill:  04/18/2025   Pharmacy Name: [facility] #[id number] - [place], [place] - [address]       Thanks,  Teresa Dickson MA

## 2025-05-13 ENCOUNTER — CLINICAL SUPPORT (OUTPATIENT)
Dept: ALLERGY | Age: 80
End: 2025-05-13
Payer: MEDICARE

## 2025-05-13 VITALS
OXYGEN SATURATION: 98 % | DIASTOLIC BLOOD PRESSURE: 69 MMHG | SYSTOLIC BLOOD PRESSURE: 115 MMHG | HEART RATE: 76 BPM | RESPIRATION RATE: 16 BRPM

## 2025-05-13 DIAGNOSIS — J30.9 CHRONIC ALLERGIC RHINITIS: ICD-10-CM

## 2025-05-13 DIAGNOSIS — J30.1 SEASONAL ALLERGIC RHINITIS DUE TO POLLEN: Primary | ICD-10-CM

## 2025-05-13 PROCEDURE — 95117 IMMUNOTHERAPY INJECTIONS: CPT | Performed by: NURSE PRACTITIONER

## 2025-05-13 NOTE — PROGRESS NOTES
PATIENT HAS THE FOLLOWING DIAGNOSIS SUPPORTING ADMINISTRATION OF ALLERGY INJECTIONS: J30.1Allergic rhinitis due to pollen  AND 81294 MULTIPLE ALLERGY INJECTIONS FOR ALLERGY INJECTION ADMINISTRATION      Patient here for allergy injection today.  Patient was presented with the opportunity to speak with provider and ask questions regarding allergy injections.  Patient was also instructed they need to wait 30 minutes after receiving allergy injections. All risks associated with potential adverse effects have been explained to patient and patient handout was provided following allergy testing.    After consent obtained/verified, allergy injection subcutaneously given in back of arm(s).  Please see below for specific details of site injections, concentration of serum, and volume injected.    VIAL COLOR OF ALL VIALS TODAY IS blue 1:100. Vial allergy w/v concentration today is: 1:100     ALLERGY INJECTION FROM VIAL A GIVEN left  UPPER ARM IN THE AMOUNT OF 0.15 ML    ALLERGY INJECTION FROM VIAL B GIVEN right lower ARM IN THE AMOUNT OF 0.15  ML    ALLERGY INJECTION FROM VIAL C GIVEN rightupper ARM IN THE AMOUNT OF 0.15 ML      Documentation of vial injection specific to arm(s) noted on Allergy Immunotherapy Administration Form.       Patient waited 30 minutes for observation.No  Patient has received information and verbalizes understanding of the potential  health risks associated with allergy injections . Patient understands risks including anaphylaxis and chooses not to wait 30 minutes after administration of allergy injection(s).    Patient tolerated well without adverse reaction while the patient was in the office.    SHOT REACTION TREATMENT INSTRUCTIONS    During the 30 minute wait after an allergy injection the following symptoms should be reported:    Itching other than at the injection site  Hives or swelling other than at the injection site  Redness other than at the injection site  Difficulty breathing  Chest

## 2025-05-15 ENCOUNTER — LAB (OUTPATIENT)
Dept: LAB | Age: 80
End: 2025-05-15

## 2025-05-15 DIAGNOSIS — E03.9 HYPOTHYROIDISM, UNSPECIFIED TYPE: ICD-10-CM

## 2025-05-15 LAB
T4 FREE SERPL-MCNC: 1.4 NG/DL (ref 0.92–1.68)
TSH SERPL DL<=0.05 MIU/L-ACNC: 0.23 UIU/ML (ref 0.27–4.2)

## 2025-05-16 ENCOUNTER — RESULTS FOLLOW-UP (OUTPATIENT)
Dept: FAMILY MEDICINE CLINIC | Age: 80
End: 2025-05-16

## 2025-05-19 ENCOUNTER — OFFICE VISIT (OUTPATIENT)
Dept: FAMILY MEDICINE CLINIC | Age: 80
End: 2025-05-19
Payer: MEDICARE

## 2025-05-19 VITALS
HEART RATE: 72 BPM | WEIGHT: 247.8 LBS | TEMPERATURE: 97.9 F | RESPIRATION RATE: 16 BRPM | SYSTOLIC BLOOD PRESSURE: 120 MMHG | BODY MASS INDEX: 38.24 KG/M2 | DIASTOLIC BLOOD PRESSURE: 68 MMHG

## 2025-05-19 DIAGNOSIS — R73.01 IFG (IMPAIRED FASTING GLUCOSE): Primary | ICD-10-CM

## 2025-05-19 DIAGNOSIS — E78.5 HYPERLIPIDEMIA WITH TARGET LDL LESS THAN 100: ICD-10-CM

## 2025-05-19 DIAGNOSIS — M19.90 OSTEOARTHRITIS, UNSPECIFIED OSTEOARTHRITIS TYPE, UNSPECIFIED SITE: ICD-10-CM

## 2025-05-19 DIAGNOSIS — E03.9 HYPOTHYROIDISM, UNSPECIFIED TYPE: ICD-10-CM

## 2025-05-19 DIAGNOSIS — T14.8XXA WOUND OF SKIN: ICD-10-CM

## 2025-05-19 DIAGNOSIS — J30.9 CHRONIC ALLERGIC RHINITIS: ICD-10-CM

## 2025-05-19 LAB — HBA1C MFR BLD: 5.4 % (ref 4.3–5.7)

## 2025-05-19 PROCEDURE — 1123F ACP DISCUSS/DSCN MKR DOCD: CPT | Performed by: NURSE PRACTITIONER

## 2025-05-19 PROCEDURE — 1160F RVW MEDS BY RX/DR IN RCRD: CPT | Performed by: NURSE PRACTITIONER

## 2025-05-19 PROCEDURE — 99214 OFFICE O/P EST MOD 30 MIN: CPT | Performed by: NURSE PRACTITIONER

## 2025-05-19 PROCEDURE — 83036 HEMOGLOBIN GLYCOSYLATED A1C: CPT | Performed by: NURSE PRACTITIONER

## 2025-05-19 PROCEDURE — 1159F MED LIST DOCD IN RCRD: CPT | Performed by: NURSE PRACTITIONER

## 2025-05-19 RX ORDER — LIDOCAINE 50 MG/G
OINTMENT TOPICAL
Qty: 50 G | Refills: 0 | Status: SHIPPED | OUTPATIENT
Start: 2025-05-19

## 2025-05-19 ASSESSMENT — ENCOUNTER SYMPTOMS
NAUSEA: 0
SHORTNESS OF BREATH: 0
CONSTIPATION: 0
VOMITING: 0
BLOOD IN STOOL: 0
DIARRHEA: 0

## 2025-05-19 NOTE — PROGRESS NOTES
Health Maintenance Due   Topic Date Due    Hepatitis C screen  Never done    Respiratory Syncytial Virus (RSV) Pregnant or age 60 yrs+ (1 - 1-dose 75+ series) Never done    COVID-19 Vaccine (4 - 2024-25 season) 09/01/2024    Annual Wellness Visit (Medicare Advantage)  01/01/2025      
    Expected Date:   11/19/2025     Expiration Date:   5/19/2026    Hemoglobin A1C     Standing Status:   Future     Expected Date:   11/19/2025     Expiration Date:   5/19/2026    T4, Free     Standing Status:   Future     Expected Date:   11/19/2025     Expiration Date:   5/19/2026    TSH     Standing Status:   Future     Expected Date:   11/19/2025     Expiration Date:   5/19/2026    Lipid Panel     Standing Status:   Future     Expected Date:   11/19/2025     Expiration Date:   5/19/2026     Is Patient Fasting?/# of Hours:   yes/12    POCT glycosylated hemoglobin (Hb A1C)       Colonoscopy - Date of last Colonoscopy: 1/8/2018   No cologuard on file  No FIT/FOBT on file   No flexible sigmoidoscopy on file  Mammo - Date of last Mammogram: 2/21/2024. Completes at [facility].   Dexa - declines  Pap/pelvic management per Dr Melina Grady for chronic yeast  OIO - Dr Alvarado for carpal tunnel  Labs - as above in 6 months  Follow up in 6 months    Assessment & Plan  1. Hypothyroidism.  - TSH levels are marginally suppressed, indicating a slight decrease, while free T4 levels remain within the normal range.  - Given her current state, it is advisable to maintain the existing treatment regimen without any alterations.  - A prescription refill for her thyroid medication will be provided and sent to the pharmacy. Her thyroid function will be reassessed in 6 months.    2. Diabetes Mellitus.  - A1c levels are commendable at 5.4.  - She has experienced a weight loss of approximately 20 pounds over the past few years.  - Current regimen and dietary habits are effective.  - Advised to continue her current regimen and dietary habits.    3. Yeast Infections.  - Reports improvement with Diflucan but has experienced significant hair loss  - Will discuss this with Dr. Grady during her appointment in June 2025.  - A prescription for lidocaine 5% ointment will be provided for application three times daily as needed, specifically for wounds

## 2025-05-20 ENCOUNTER — CLINICAL SUPPORT (OUTPATIENT)
Dept: ALLERGY | Age: 80
End: 2025-05-20
Payer: MEDICARE

## 2025-05-20 VITALS
HEART RATE: 74 BPM | OXYGEN SATURATION: 97 % | RESPIRATION RATE: 18 BRPM | DIASTOLIC BLOOD PRESSURE: 76 MMHG | SYSTOLIC BLOOD PRESSURE: 118 MMHG

## 2025-05-20 DIAGNOSIS — J30.1 SEASONAL ALLERGIC RHINITIS DUE TO POLLEN: Primary | ICD-10-CM

## 2025-05-20 DIAGNOSIS — J30.9 CHRONIC ALLERGIC RHINITIS: ICD-10-CM

## 2025-05-20 PROCEDURE — 95117 IMMUNOTHERAPY INJECTIONS: CPT | Performed by: NURSE PRACTITIONER

## 2025-05-20 RX ORDER — LEVOTHYROXINE SODIUM 150 MCG
150 TABLET ORAL DAILY
Qty: 90 TABLET | Refills: 3 | Status: SHIPPED | OUTPATIENT
Start: 2025-05-20

## 2025-05-20 NOTE — PROGRESS NOTES
PATIENT HAS THE FOLLOWING DIAGNOSIS SUPPORTING ADMINISTRATION OF ALLERGY INJECTIONS: J30.1Allergic rhinitis due to pollen  AND [id number] MULTIPLE ALLERGY INJECTIONS FOR ALLERGY INJECTION ADMINISTRATION      Patient here for allergy injection today.  Patient was presented with the opportunity to speak with provider and ask questions regarding allergy injections.  Patient was also instructed they need to wait 30 minutes after receiving allergy injections. All risks associated with potential adverse effects have been explained to patient and patient handout was provided following allergy testing.    After consent obtained/verified, allergy injection subcutaneously given in back of arm(s).  Please see below for specific details of site injections, concentration of serum, and volume injected.    VIAL COLOR OF ALL VIALS TODAY IS blue 1:100. Vial allergy w/v concentration today is: 1:100     ALLERGY INJECTION FROM VIAL A GIVEN right  UPPER ARM IN THE AMOUNT OF 0.20 ML    ALLERGY INJECTION FROM VIAL B GIVEN left lower ARM IN THE AMOUNT OF 0.20  ML    ALLERGY INJECTION FROM VIAL C GIVEN leftupper ARM IN THE AMOUNT OF 0.20 ML      Documentation of vial injection specific to arm(s) noted on Allergy Immunotherapy Administration Form.       Patient waited 30 minutes for observation.No  Patient has received information and verbalizes understanding of the potential  health risks associated with allergy injections . Patient understands risks including anaphylaxis and chooses not to wait 30 minutes after administration of allergy injection(s).    Patient tolerated well without adverse reaction while the patient was in the office.    SHOT REACTION TREATMENT INSTRUCTIONS    During the 30 minute wait after an allergy injection the following symptoms should be reported:    Itching other than at the injection site  Hives or swelling other than at the injection site  Redness other than at the injection site  Difficulty breathing  Chest

## 2025-05-20 NOTE — TELEPHONE ENCOUNTER
Patient seen in office yesterday and states that medication was not refilled.  Please review and sign if appropriate.

## 2025-05-27 ENCOUNTER — CLINICAL SUPPORT (OUTPATIENT)
Dept: ALLERGY | Age: 80
End: 2025-05-27
Payer: MEDICARE

## 2025-05-27 VITALS
OXYGEN SATURATION: 98 % | RESPIRATION RATE: 18 BRPM | SYSTOLIC BLOOD PRESSURE: 125 MMHG | HEART RATE: 69 BPM | DIASTOLIC BLOOD PRESSURE: 83 MMHG

## 2025-05-27 DIAGNOSIS — J30.1 SEASONAL ALLERGIC RHINITIS DUE TO POLLEN: Primary | ICD-10-CM

## 2025-05-27 DIAGNOSIS — J30.9 CHRONIC ALLERGIC RHINITIS: ICD-10-CM

## 2025-05-27 PROCEDURE — 95117 IMMUNOTHERAPY INJECTIONS: CPT | Performed by: NURSE PRACTITIONER

## 2025-05-27 NOTE — PROGRESS NOTES
PATIENT HAS THE FOLLOWING DIAGNOSIS SUPPORTING ADMINISTRATION OF ALLERGY INJECTIONS: J30.1Allergic rhinitis due to pollen  AND [id number] MULTIPLE ALLERGY INJECTIONS FOR ALLERGY INJECTION ADMINISTRATION      Patient here for allergy injection today.  Patient was presented with the opportunity to speak with provider and ask questions regarding allergy injections.  Patient was also instructed they need to wait 30 minutes after receiving allergy injections. All risks associated with potential adverse effects have been explained to patient and patient handout was provided following allergy testing.    After consent obtained/verified, allergy injection subcutaneously given in back of arm(s).  Please see below for specific details of site injections, concentration of serum, and volume injected.    VIAL COLOR OF ALL VIALS TODAY IS blue 1:100. Vial allergy w/v concentration today is: 1:100     ALLERGY INJECTION FROM VIAL A GIVEN left  UPPER ARM IN THE AMOUNT OF 0.25 ML    ALLERGY INJECTION FROM VIAL B GIVEN right upper ARM IN THE AMOUNT OF 0.25  ML    ALLERGY INJECTION FROM VIAL C GIVEN rightlower ARM IN THE AMOUNT OF 0.25 ML      Documentation of vial injection specific to arm(s) noted on Allergy Immunotherapy Administration Form.       Patient waited 30 minutes for observation.No  Patient has received information and verbalizes understanding of the potential  health risks associated with allergy injections . Patient understands risks including anaphylaxis and chooses not to wait 30 minutes after administration of allergy injection(s).    Patient tolerated well without adverse reaction while the patient was in the office.    SHOT REACTION TREATMENT INSTRUCTIONS    During the 30 minute wait after an allergy injection the following symptoms should be reported:    Itching other than at the injection site  Hives or swelling other than at the injection site  Redness other than at the injection site  Difficulty breathing  Chest

## 2025-05-29 ENCOUNTER — CLINICAL SUPPORT (OUTPATIENT)
Dept: ALLERGY | Age: 80
End: 2025-05-29
Payer: MEDICARE

## 2025-05-29 VITALS
RESPIRATION RATE: 18 BRPM | DIASTOLIC BLOOD PRESSURE: 81 MMHG | SYSTOLIC BLOOD PRESSURE: 120 MMHG | HEART RATE: 74 BPM | OXYGEN SATURATION: 94 %

## 2025-05-29 DIAGNOSIS — J30.1 SEASONAL ALLERGIC RHINITIS DUE TO POLLEN: Primary | ICD-10-CM

## 2025-05-29 DIAGNOSIS — J30.9 CHRONIC ALLERGIC RHINITIS: ICD-10-CM

## 2025-05-29 PROCEDURE — 95117 IMMUNOTHERAPY INJECTIONS: CPT | Performed by: NURSE PRACTITIONER

## 2025-05-29 NOTE — PROGRESS NOTES
PATIENT HAS THE FOLLOWING DIAGNOSIS SUPPORTING ADMINISTRATION OF ALLERGY INJECTIONS: J30.1Allergic rhinitis due to pollen  AND [id number] MULTIPLE ALLERGY INJECTIONS FOR ALLERGY INJECTION ADMINISTRATION      Patient here for allergy injection today.  Patient was presented with the opportunity to speak with provider and ask questions regarding allergy injections.  Patient was also instructed they need to wait 30 minutes after receiving allergy injections. All risks associated with potential adverse effects have been explained to patient and patient handout was provided following allergy testing.    After consent obtained/verified, allergy injection subcutaneously given in back of arm(s).  Please see below for specific details of site injections, concentration of serum, and volume injected.    VIAL COLOR OF ALL VIALS TODAY IS blue 1:100. Vial allergy w/v concentration today is: 1:100     ALLERGY INJECTION FROM VIAL A GIVEN right  UPPER ARM IN THE AMOUNT OF 0.30 ML    ALLERGY INJECTION FROM VIAL B GIVEN left upper ARM IN THE AMOUNT OF 0.30  ML    ALLERGY INJECTION FROM VIAL C GIVEN leftlower ARM IN THE AMOUNT OF 0.30 ML      Documentation of vial injection specific to arm(s) noted on Allergy Immunotherapy Administration Form.       Patient waited 30 minutes for observation.No  Patient has received information and verbalizes understanding of the potential  health risks associated with allergy injections . Patient understands risks including anaphylaxis and chooses not to wait 30 minutes after administration of allergy injection(s).    Patient tolerated well without adverse reaction while the patient was in the office.    SHOT REACTION TREATMENT INSTRUCTIONS    During the 30 minute wait after an allergy injection the following symptoms should be reported:    Itching other than at the injection site  Hives or swelling other than at the injection site  Redness other than at the injection site  Difficulty breathing  Chest

## 2025-06-03 ENCOUNTER — CLINICAL SUPPORT (OUTPATIENT)
Dept: ALLERGY | Age: 80
End: 2025-06-03
Payer: MEDICARE

## 2025-06-03 VITALS
OXYGEN SATURATION: 98 % | HEART RATE: 71 BPM | RESPIRATION RATE: 18 BRPM | DIASTOLIC BLOOD PRESSURE: 77 MMHG | SYSTOLIC BLOOD PRESSURE: 140 MMHG

## 2025-06-03 DIAGNOSIS — J30.9 CHRONIC ALLERGIC RHINITIS: ICD-10-CM

## 2025-06-03 DIAGNOSIS — J30.1 SEASONAL ALLERGIC RHINITIS DUE TO POLLEN: Primary | ICD-10-CM

## 2025-06-03 PROCEDURE — 95117 IMMUNOTHERAPY INJECTIONS: CPT | Performed by: NURSE PRACTITIONER

## 2025-06-05 ENCOUNTER — CLINICAL SUPPORT (OUTPATIENT)
Dept: ALLERGY | Age: 80
End: 2025-06-05
Payer: MEDICARE

## 2025-06-05 VITALS
RESPIRATION RATE: 18 BRPM | SYSTOLIC BLOOD PRESSURE: 118 MMHG | DIASTOLIC BLOOD PRESSURE: 90 MMHG | HEART RATE: 79 BPM | OXYGEN SATURATION: 98 %

## 2025-06-05 DIAGNOSIS — J30.1 SEASONAL ALLERGIC RHINITIS DUE TO POLLEN: Primary | ICD-10-CM

## 2025-06-05 DIAGNOSIS — J30.9 CHRONIC ALLERGIC RHINITIS: ICD-10-CM

## 2025-06-05 PROCEDURE — 95117 IMMUNOTHERAPY INJECTIONS: CPT | Performed by: NURSE PRACTITIONER

## 2025-06-05 NOTE — PROGRESS NOTES
PATIENT HAS THE FOLLOWING DIAGNOSIS SUPPORTING ADMINISTRATION OF ALLERGY INJECTIONS: J30.1Allergic rhinitis due to pollen  AND [id number] MULTIPLE ALLERGY INJECTIONS FOR ALLERGY INJECTION ADMINISTRATION      Patient here for allergy injection today.  Patient was presented with the opportunity to speak with provider and ask questions regarding allergy injections.  Patient was also instructed they need to wait 30 minutes after receiving allergy injections. All risks associated with potential adverse effects have been explained to patient and patient handout was provided following allergy testing.    After consent obtained/verified, allergy injection subcutaneously given in back of arm(s).  Please see below for specific details of site injections, concentration of serum, and volume injected.    VIAL COLOR OF ALL VIALS TODAY IS blue 1:100. Vial allergy w/v concentration today is: 1:100     ALLERGY INJECTION FROM VIAL A GIVEN right  UPPER ARM IN THE AMOUNT OF 0.40 ML    ALLERGY INJECTION FROM VIAL B GIVEN left upper ARM IN THE AMOUNT OF 0.40  ML    ALLERGY INJECTION FROM VIAL C GIVEN leftlower ARM IN THE AMOUNT OF 0.40 ML      Documentation of vial injection specific to arm(s) noted on Allergy Immunotherapy Administration Form.       Patient waited 30 minutes for observation.No  Patient has received information and verbalizes understanding of the potential  health risks associated with allergy injections . Patient understands risks including anaphylaxis and chooses not to wait 30 minutes after administration of allergy injection(s).    Patient tolerated well without adverse reaction while the patient was in the office.    SHOT REACTION TREATMENT INSTRUCTIONS    During the 30 minute wait after an allergy injection the following symptoms should be reported:    Itching other than at the injection site  Hives or swelling other than at the injection site  Redness other than at the injection site  Difficulty breathing  Chest

## 2025-06-09 ENCOUNTER — CLINICAL SUPPORT (OUTPATIENT)
Dept: ALLERGY | Age: 80
End: 2025-06-09
Payer: MEDICARE

## 2025-06-09 VITALS
HEART RATE: 82 BPM | DIASTOLIC BLOOD PRESSURE: 91 MMHG | RESPIRATION RATE: 18 BRPM | OXYGEN SATURATION: 94 % | SYSTOLIC BLOOD PRESSURE: 136 MMHG

## 2025-06-09 DIAGNOSIS — J30.1 SEASONAL ALLERGIC RHINITIS DUE TO POLLEN: Primary | ICD-10-CM

## 2025-06-09 DIAGNOSIS — J30.9 CHRONIC ALLERGIC RHINITIS: ICD-10-CM

## 2025-06-09 PROCEDURE — 95117 IMMUNOTHERAPY INJECTIONS: CPT | Performed by: NURSE PRACTITIONER

## 2025-06-12 ENCOUNTER — CLINICAL SUPPORT (OUTPATIENT)
Dept: ALLERGY | Age: 80
End: 2025-06-12

## 2025-06-12 VITALS
RESPIRATION RATE: 18 BRPM | SYSTOLIC BLOOD PRESSURE: 143 MMHG | DIASTOLIC BLOOD PRESSURE: 83 MMHG | HEART RATE: 80 BPM | OXYGEN SATURATION: 93 %

## 2025-06-12 DIAGNOSIS — J30.1 SEASONAL ALLERGIC RHINITIS DUE TO POLLEN: Primary | ICD-10-CM

## 2025-06-12 DIAGNOSIS — J30.9 CHRONIC ALLERGIC RHINITIS: ICD-10-CM

## 2025-06-16 ENCOUNTER — CLINICAL SUPPORT (OUTPATIENT)
Dept: ALLERGY | Age: 80
End: 2025-06-16
Payer: MEDICARE

## 2025-06-16 VITALS — DIASTOLIC BLOOD PRESSURE: 86 MMHG | SYSTOLIC BLOOD PRESSURE: 146 MMHG | OXYGEN SATURATION: 96 % | HEART RATE: 79 BPM

## 2025-06-16 DIAGNOSIS — Z91.048 ALLERGY TO MOLD: ICD-10-CM

## 2025-06-16 DIAGNOSIS — J30.1 SEASONAL ALLERGIC RHINITIS DUE TO POLLEN: Primary | ICD-10-CM

## 2025-06-16 DIAGNOSIS — J30.9 CHRONIC ALLERGIC RHINITIS: ICD-10-CM

## 2025-06-16 PROCEDURE — 95117 IMMUNOTHERAPY INJECTIONS: CPT | Performed by: NURSE PRACTITIONER

## 2025-06-16 NOTE — PROGRESS NOTES
Discharge you I am lenka anaya PATIENT HAS THE FOLLOWING DIAGNOSIS SUPPORTING ADMINISTRATION OF ALLERGY INJECTIONS: J30.1Allergic rhinitis due to pollen  AND [id number] MULTIPLE ALLERGY INJECTIONS FOR ALLERGY INJECTION ADMINISTRATION      Patient here for allergy injection today.  Patient was presented with the opportunity to speak with provider and ask questions regarding allergy injections.  Patient was also instructed they need to wait 30 minutes after receiving allergy injections. All risks associated with potential adverse effects have been explained to patient and patient handout was provided following allergy testing.    After consent obtained/verified, allergy injection subcutaneously given in back of arm(s).  Please see below for specific details of site injections, concentration of serum, and volume injected.    VIAL COLOR OF ALL VIALS TODAY IS yellow 1:10 concentration    ALLERGY INJECTION FROM VIAL A GIVEN left  UPPER ARM IN THE AMOUNT OF 0.05 ML    ALLERGY INJECTION FROM VIAL B GIVEN right lower ARM IN THE AMOUNT OF 0.05  ML    ALLERGY INJECTION FROM VIAL C GIVEN rightupper ARM IN THE AMOUNT OF 0.05 ML    Documentation of vial injection specific to arm(s) noted on Allergy Immunotherapy Administration Form.       Patient waited 30 minutes for observation.No  Patient has received information and verbalizes understanding of the potential  health risks associated with allergy injections . Patient understands risks including anaphylaxis and chooses not to wait 30 minutes after administration of allergy injection(s).    Patient tolerated well without adverse reaction while the patient was in the office.    SHOT REACTION TREATMENT INSTRUCTIONS    During the 30 minute wait after an allergy injection the following symptoms should be reported:    Itching other than at the injection site  Hives or swelling other than at the injection site  Redness other than at the injection site  Difficulty

## 2025-06-18 ENCOUNTER — CLINICAL SUPPORT (OUTPATIENT)
Dept: ALLERGY | Age: 80
End: 2025-06-18
Payer: MEDICARE

## 2025-06-18 VITALS
RESPIRATION RATE: 18 BRPM | HEART RATE: 77 BPM | DIASTOLIC BLOOD PRESSURE: 83 MMHG | OXYGEN SATURATION: 98 % | SYSTOLIC BLOOD PRESSURE: 123 MMHG

## 2025-06-18 DIAGNOSIS — J30.9 CHRONIC ALLERGIC RHINITIS: ICD-10-CM

## 2025-06-18 DIAGNOSIS — J30.1 SEASONAL ALLERGIC RHINITIS DUE TO POLLEN: Primary | ICD-10-CM

## 2025-06-18 PROCEDURE — 95117 IMMUNOTHERAPY INJECTIONS: CPT | Performed by: NURSE PRACTITIONER

## 2025-06-18 NOTE — PROGRESS NOTES
PATIENT HAS THE FOLLOWING DIAGNOSIS SUPPORTING ADMINISTRATION OF ALLERGY INJECTIONS: J30.1Allergic rhinitis due to pollen  AND [id number] MULTIPLE ALLERGY INJECTIONS FOR ALLERGY INJECTION ADMINISTRATION      Patient here for allergy injection today.  Patient was presented with the opportunity to speak with provider and ask questions regarding allergy injections.  Patient was also instructed they need to wait 30 minutes after receiving allergy injections. All risks associated with potential adverse effects have been explained to patient and patient handout was provided following allergy testing.    After consent obtained/verified, allergy injection subcutaneously given in back of arm(s).  Please see below for specific details of site injections, concentration of serum, and volume injected.    VIAL COLOR OF ALL VIALS TODAY IS yellow 1:10 concentration    ALLERGY INJECTION FROM VIAL A GIVEN right  UPPER ARM IN THE AMOUNT OF 0.10 ML    ALLERGY INJECTION FROM VIAL B GIVEN left lower ARM IN THE AMOUNT OF 0.10  ML    ALLERGY INJECTION FROM VIAL C GIVEN leftupper ARM IN THE AMOUNT OF 0.10 ML    Documentation of vial injection specific to arm(s) noted on Allergy Immunotherapy Administration Form.       Patient waited 30 minutes for observation.No  Patient has received information and verbalizes understanding of the potential  health risks associated with allergy injections . Patient understands risks including anaphylaxis and chooses not to wait 30 minutes after administration of allergy injection(s).    Patient tolerated well without adverse reaction while the patient was in the office.    SHOT REACTION TREATMENT INSTRUCTIONS    During the 30 minute wait after an allergy injection the following symptoms should be reported:    Itching other than at the injection site  Hives or swelling other than at the injection site  Redness other than at the injection site  Difficulty breathing  Chest tightness  Difficulty swallowing  Throat

## 2025-06-23 ENCOUNTER — CLINICAL SUPPORT (OUTPATIENT)
Dept: ALLERGY | Age: 80
End: 2025-06-23
Payer: MEDICARE

## 2025-06-23 VITALS
DIASTOLIC BLOOD PRESSURE: 61 MMHG | RESPIRATION RATE: 18 BRPM | HEART RATE: 91 BPM | SYSTOLIC BLOOD PRESSURE: 132 MMHG | OXYGEN SATURATION: 98 %

## 2025-06-23 DIAGNOSIS — J30.9 CHRONIC ALLERGIC RHINITIS: ICD-10-CM

## 2025-06-23 DIAGNOSIS — J30.1 SEASONAL ALLERGIC RHINITIS DUE TO POLLEN: Primary | ICD-10-CM

## 2025-06-23 PROCEDURE — 95117 IMMUNOTHERAPY INJECTIONS: CPT | Performed by: NURSE PRACTITIONER

## 2025-06-23 NOTE — PROGRESS NOTES
PATIENT HAS THE FOLLOWING DIAGNOSIS SUPPORTING ADMINISTRATION OF ALLERGY INJECTIONS: J30.1Allergic rhinitis due to pollen  AND [id number] MULTIPLE ALLERGY INJECTIONS FOR ALLERGY INJECTION ADMINISTRATION      Patient here for allergy injection today.  Patient was presented with the opportunity to speak with provider and ask questions regarding allergy injections.  Patient was also instructed they need to wait 30 minutes after receiving allergy injections. All risks associated with potential adverse effects have been explained to patient and patient handout was provided following allergy testing.    After consent obtained/verified, allergy injection subcutaneously given in back of arm(s).  Please see below for specific details of site injections, concentration of serum, and volume injected.    VIAL COLOR OF ALL VIALS TODAY IS yellow 1:10 concentration    ALLERGY INJECTION FROM VIAL A GIVEN left  UPPER ARM IN THE AMOUNT OF 0.15 ML    ALLERGY INJECTION FROM VIAL B GIVEN right UPPER ARM IN THE AMOUNT OF 0.15  ML    ALLERGY INJECTION FROM VIAL C GIVEN rightLOWER ARM IN THE AMOUNT OF 0.15 ML    Documentation of vial injection specific to arm(s) noted on Allergy Immunotherapy Administration Form.       Patient waited 30 minutes for observation.No  Patient has received information and verbalizes understanding of the potential  health risks associated with allergy injections . Patient understands risks including anaphylaxis and chooses not to wait 30 minutes after administration of allergy injection(s).    Patient tolerated well without adverse reaction while the patient was in the office.    SHOT REACTION TREATMENT INSTRUCTIONS    During the 30 minute wait after an allergy injection the following symptoms should be reported:    Itching other than at the injection site  Hives or swelling other than at the injection site  Redness other than at the injection site  Difficulty breathing  Chest tightness  Difficulty swallowing  Throat

## 2025-06-25 ENCOUNTER — CLINICAL SUPPORT (OUTPATIENT)
Dept: ALLERGY | Age: 80
End: 2025-06-25
Payer: MEDICARE

## 2025-06-25 VITALS
RESPIRATION RATE: 18 BRPM | OXYGEN SATURATION: 98 % | DIASTOLIC BLOOD PRESSURE: 80 MMHG | HEART RATE: 72 BPM | SYSTOLIC BLOOD PRESSURE: 134 MMHG

## 2025-06-25 DIAGNOSIS — J30.1 SEASONAL ALLERGIC RHINITIS DUE TO POLLEN: Primary | ICD-10-CM

## 2025-06-25 DIAGNOSIS — J30.9 CHRONIC ALLERGIC RHINITIS: ICD-10-CM

## 2025-06-25 PROCEDURE — 95117 IMMUNOTHERAPY INJECTIONS: CPT | Performed by: NURSE PRACTITIONER

## 2025-06-25 NOTE — PROGRESS NOTES
PATIENT HAS THE FOLLOWING DIAGNOSIS SUPPORTING ADMINISTRATION OF ALLERGY INJECTIONS: J30.1Allergic rhinitis due to pollen  AND [id number] MULTIPLE ALLERGY INJECTIONS FOR ALLERGY INJECTION ADMINISTRATION      Patient here for allergy injection today.  Patient was presented with the opportunity to speak with provider and ask questions regarding allergy injections.  Patient was also instructed they need to wait 30 minutes after receiving allergy injections. All risks associated with potential adverse effects have been explained to patient and patient handout was provided following allergy testing.    After consent obtained/verified, allergy injection subcutaneously given in back of arm(s).  Please see below for specific details of site injections, concentration of serum, and volume injected.    VIAL COLOR OF ALL VIALS TODAY IS yellow 1:10 concentration    ALLERGY INJECTION FROM VIAL A GIVEN right  UPPER ARM IN THE AMOUNT OF 0.20 ML    ALLERGY INJECTION FROM VIAL B GIVEN left upper ARM IN THE AMOUNT OF 0.20  ML    ALLERGY INJECTION FROM VIAL C GIVEN leftlower ARM IN THE AMOUNT OF 0.20 ML    Documentation of vial injection specific to arm(s) noted on Allergy Immunotherapy Administration Form.       Patient waited 30 minutes for observation.No  Patient has received information and verbalizes understanding of the potential  health risks associated with allergy injections . Patient understands risks including anaphylaxis and chooses not to wait 30 minutes after administration of allergy injection(s).    Patient tolerated well without adverse reaction while the patient was in the office.    SHOT REACTION TREATMENT INSTRUCTIONS    During the 30 minute wait after an allergy injection the following symptoms should be reported:    Itching other than at the injection site  Hives or swelling other than at the injection site  Redness other than at the injection site  Difficulty breathing  Chest tightness  Difficulty swallowing  Throat

## 2025-06-30 ENCOUNTER — CLINICAL SUPPORT (OUTPATIENT)
Dept: ALLERGY | Age: 80
End: 2025-06-30
Payer: MEDICARE

## 2025-06-30 VITALS
OXYGEN SATURATION: 95 % | DIASTOLIC BLOOD PRESSURE: 89 MMHG | RESPIRATION RATE: 16 BRPM | SYSTOLIC BLOOD PRESSURE: 127 MMHG

## 2025-06-30 DIAGNOSIS — J30.1 SEASONAL ALLERGIC RHINITIS DUE TO POLLEN: ICD-10-CM

## 2025-06-30 DIAGNOSIS — J30.9 CHRONIC ALLERGIC RHINITIS: Primary | ICD-10-CM

## 2025-06-30 PROCEDURE — 95117 IMMUNOTHERAPY INJECTIONS: CPT | Performed by: NURSE PRACTITIONER

## 2025-06-30 NOTE — PROGRESS NOTES
PATIENT HAS THE FOLLOWING DIAGNOSIS SUPPORTING ADMINISTRATION OF ALLERGY INJECTIONS: J30.1Allergic rhinitis due to pollen  AND [id number] MULTIPLE ALLERGY INJECTIONS FOR ALLERGY INJECTION ADMINISTRATION      Patient here for allergy injection today.  Patient was presented with the opportunity to speak with provider and ask questions regarding allergy injections.  Patient was also instructed they need to wait 30 minutes after receiving allergy injections. All risks associated with potential adverse effects have been explained to patient and patient handout was provided following allergy testing.    After consent obtained/verified, allergy injection subcutaneously given in back of arm(s).  Please see below for specific details of site injections, concentration of serum, and volume injected.    VIAL COLOR OF ALL VIALS TODAY IS yellow 1:10 concentration    ALLERGY INJECTION FROM VIAL A GIVEN left  UPPER ARM IN THE AMOUNT OF 0.25 ML    ALLERGY INJECTION FROM VIAL B GIVEN right lower ARM IN THE AMOUNT OF 0.25  ML    ALLERGY INJECTION FROM VIAL C GIVEN rightupper ARM IN THE AMOUNT OF 0.25 ML    Documentation of vial injection specific to arm(s) noted on Allergy Immunotherapy Administration Form.       Patient waited 30 minutes for observation.No  Patient has received information and verbalizes understanding of the potential  health risks associated with allergy injections . Patient understands risks including anaphylaxis and chooses not to wait 30 minutes after administration of allergy injection(s).    Patient tolerated well without adverse reaction while the patient was in the office.    SHOT REACTION TREATMENT INSTRUCTIONS    During the 30 minute wait after an allergy injection the following symptoms should be reported:    Itching other than at the injection site  Hives or swelling other than at the injection site  Redness other than at the injection site  Difficulty breathing  Chest tightness  Difficulty swallowing  Throat

## 2025-07-03 ENCOUNTER — CLINICAL SUPPORT (OUTPATIENT)
Dept: ALLERGY | Age: 80
End: 2025-07-03

## 2025-07-03 VITALS
DIASTOLIC BLOOD PRESSURE: 65 MMHG | SYSTOLIC BLOOD PRESSURE: 114 MMHG | OXYGEN SATURATION: 95 % | HEART RATE: 76 BPM | RESPIRATION RATE: 18 BRPM

## 2025-07-03 DIAGNOSIS — J30.9 CHRONIC ALLERGIC RHINITIS: Primary | ICD-10-CM

## 2025-07-03 DIAGNOSIS — J30.1 SEASONAL ALLERGIC RHINITIS DUE TO POLLEN: ICD-10-CM

## 2025-07-03 NOTE — PROGRESS NOTES
PATIENT HAS THE FOLLOWING DIAGNOSIS SUPPORTING ADMINISTRATION OF ALLERGY INJECTIONS: J30.1Allergic rhinitis due to pollen  AND [id number] MULTIPLE ALLERGY INJECTIONS FOR ALLERGY INJECTION ADMINISTRATION      Patient here for allergy injection today.  Patient was presented with the opportunity to speak with provider and ask questions regarding allergy injections.  Patient was also instructed they need to wait 30 minutes after receiving allergy injections. All risks associated with potential adverse effects have been explained to patient and patient handout was provided following allergy testing.    After consent obtained/verified, allergy injection subcutaneously given in back of arm(s).  Please see below for specific details of site injections, concentration of serum, and volume injected.    VIAL COLOR OF ALL VIALS TODAY IS yellow 1:10 concentration    ALLERGY INJECTION FROM VIAL A GIVEN right  UPPER ARM IN THE AMOUNT OF 0.30 ML    ALLERGY INJECTION FROM VIAL B GIVEN left lower ARM IN THE AMOUNT OF 0.30  ML    ALLERGY INJECTION FROM VIAL C GIVEN leftupper ARM IN THE AMOUNT OF 0.30 ML    Documentation of vial injection specific to arm(s) noted on Allergy Immunotherapy Administration Form.       Patient waited 30 minutes for observation.No  Patient has received information and verbalizes understanding of the potential  health risks associated with allergy injections . Patient understands risks including anaphylaxis and chooses not to wait 30 minutes after administration of allergy injection(s).    Patient tolerated well without adverse reaction while the patient was in the office.    SHOT REACTION TREATMENT INSTRUCTIONS    During the 30 minute wait after an allergy injection the following symptoms should be reported:    Itching other than at the injection site  Hives or swelling other than at the injection site  Redness other than at the injection site  Difficulty breathing  Chest tightness  Difficulty swallowing  Throat

## 2025-07-07 ENCOUNTER — CLINICAL SUPPORT (OUTPATIENT)
Dept: ALLERGY | Age: 80
End: 2025-07-07
Payer: MEDICARE

## 2025-07-07 VITALS
HEART RATE: 76 BPM | OXYGEN SATURATION: 96 % | SYSTOLIC BLOOD PRESSURE: 132 MMHG | DIASTOLIC BLOOD PRESSURE: 74 MMHG | RESPIRATION RATE: 16 BRPM

## 2025-07-07 DIAGNOSIS — J30.9 CHRONIC ALLERGIC RHINITIS: ICD-10-CM

## 2025-07-07 DIAGNOSIS — J30.1 SEASONAL ALLERGIC RHINITIS DUE TO POLLEN: Primary | ICD-10-CM

## 2025-07-07 PROCEDURE — 95117 IMMUNOTHERAPY INJECTIONS: CPT | Performed by: NURSE PRACTITIONER

## 2025-07-07 NOTE — PROGRESS NOTES
tightness    If these symptoms occur, NOTIFY PROVIDER and the following treatment should be administered:    1.  Epinephrine/Auvi Q 1:1000 IM - 0.3 ml if > 66 lbs or more, 0.15 ml if 33 - 63 lbs, or 0.1 ml if <33 lbs     2.  Diphenhydramine - give all intramuscular:     2 to <6 years (off-label use): 6.25 mg,    6 to <12 years: 12.5 to 25 mg;    >=12 years: 25-50 mg.    3.  Famotidine:  Adults 40 mg oral    Adolescents age 16 years and >88 lbs: 40 mg    Children and Adolescents <=16 years of age: Initial: 0.25 mg/kg/dose  every 12 hours (maximum daily dose: 40 mg/day)    Epi/Auvi Q dose may me repeated in 5-15 minutes if adequate resolution of symptoms does not occur    Patient should be observed for at least one hour after final Epi/Auvi Q dose and must be seen by provider.  Patients cannot drive themselves if they have received diphenhydramine.

## 2025-07-09 ENCOUNTER — OFFICE VISIT (OUTPATIENT)
Dept: ALLERGY | Age: 80
End: 2025-07-09
Payer: MEDICARE

## 2025-07-09 ENCOUNTER — CLINICAL SUPPORT (OUTPATIENT)
Dept: ALLERGY | Age: 80
End: 2025-07-09
Payer: MEDICARE

## 2025-07-09 VITALS
SYSTOLIC BLOOD PRESSURE: 142 MMHG | WEIGHT: 257.2 LBS | OXYGEN SATURATION: 94 % | DIASTOLIC BLOOD PRESSURE: 67 MMHG | HEIGHT: 67 IN | RESPIRATION RATE: 18 BRPM | BODY MASS INDEX: 40.37 KG/M2 | HEART RATE: 85 BPM

## 2025-07-09 VITALS
DIASTOLIC BLOOD PRESSURE: 67 MMHG | HEART RATE: 85 BPM | RESPIRATION RATE: 18 BRPM | SYSTOLIC BLOOD PRESSURE: 142 MMHG | OXYGEN SATURATION: 94 %

## 2025-07-09 DIAGNOSIS — J32.4 CHRONIC PANSINUSITIS: ICD-10-CM

## 2025-07-09 DIAGNOSIS — Z91.048 ALLERGY TO MOLD: ICD-10-CM

## 2025-07-09 DIAGNOSIS — J30.9 CHRONIC ALLERGIC RHINITIS: ICD-10-CM

## 2025-07-09 DIAGNOSIS — J30.9 ALLERGIC SINUSITIS: ICD-10-CM

## 2025-07-09 DIAGNOSIS — H10.10 ALLERGIC RHINOCONJUNCTIVITIS: ICD-10-CM

## 2025-07-09 DIAGNOSIS — J30.9 ALLERGIC RHINOCONJUNCTIVITIS: ICD-10-CM

## 2025-07-09 DIAGNOSIS — J30.1 SEASONAL ALLERGIC RHINITIS DUE TO POLLEN: Primary | ICD-10-CM

## 2025-07-09 DIAGNOSIS — J30.81 ALLERGY TO DOG DANDER: ICD-10-CM

## 2025-07-09 DIAGNOSIS — Z91.09 MITE ALLERGY: ICD-10-CM

## 2025-07-09 DIAGNOSIS — J30.81 CAT ALLERGIES: ICD-10-CM

## 2025-07-09 DIAGNOSIS — Z91.038 ALLERGY TO COCKROACHES: ICD-10-CM

## 2025-07-09 DIAGNOSIS — Z91.09 POLLEN ALLERGIES: ICD-10-CM

## 2025-07-09 PROCEDURE — 95117 IMMUNOTHERAPY INJECTIONS: CPT | Performed by: NURSE PRACTITIONER

## 2025-07-09 PROCEDURE — 1123F ACP DISCUSS/DSCN MKR DOCD: CPT | Performed by: NURSE PRACTITIONER

## 2025-07-09 PROCEDURE — 1159F MED LIST DOCD IN RCRD: CPT | Performed by: NURSE PRACTITIONER

## 2025-07-09 PROCEDURE — 99213 OFFICE O/P EST LOW 20 MIN: CPT | Performed by: NURSE PRACTITIONER

## 2025-07-09 NOTE — PROGRESS NOTES
PATIENT HAS THE FOLLOWING DIAGNOSIS SUPPORTING ADMINISTRATION OF ALLERGY INJECTIONS: J30.1Allergic rhinitis due to pollen  AND [id number] MULTIPLE ALLERGY INJECTIONS FOR ALLERGY INJECTION ADMINISTRATION      Patient here for allergy injection today.  Patient was presented with the opportunity to speak with provider and ask questions regarding allergy injections.  Patient was also instructed they need to wait 30 minutes after receiving allergy injections. All risks associated with potential adverse effects have been explained to patient and patient handout was provided following allergy testing.    After consent obtained/verified, allergy injection subcutaneously given in back of arm(s).  Please see below for specific details of site injections, concentration of serum, and volume injected.    VIAL COLOR OF ALL VIALS TODAY IS yellow 1:10 concentration    ALLERGY INJECTION FROM VIAL A GIVEN right  UPPER ARM IN THE AMOUNT OF 0.40 ML    ALLERGY INJECTION FROM VIAL B GIVEN left upper ARM IN THE AMOUNT OF 0.40  ML    ALLERGY INJECTION FROM VIAL C GIVEN leftlower ARM IN THE AMOUNT OF 0.40 ML    Documentation of vial injection specific to arm(s) noted on Allergy Immunotherapy Administration Form.       Patient waited 30 minutes for observation.No  Patient has received information and verbalizes understanding of the potential  health risks associated with allergy injections . Patient understands risks including anaphylaxis and chooses not to wait 30 minutes after administration of allergy injection(s).    Patient tolerated well without adverse reaction while the patient was in the office.    SHOT REACTION TREATMENT INSTRUCTIONS    During the 30 minute wait after an allergy injection the following symptoms should be reported:    Itching other than at the injection site  Hives or swelling other than at the injection site  Redness other than at the injection site  Difficulty breathing  Chest tightness  Difficulty swallowing  Throat

## 2025-07-09 NOTE — PROGRESS NOTES
Physical Exam:      Vitals:    Vitals:    07/09/25 1427   BP: (!) 142/67   Pulse: 85   Resp: 18   SpO2: 94%       116.7 kg (257 lb 3.2 oz)         Physical Exam:  Physical Exam             Physical Exam  Vitals and nursing note reviewed.   Constitutional:       Appearance: Normal appearance. She is normal weight.   HENT:      Head: Normocephalic and atraumatic.      Right Ear: Ear canal and external ear normal.      Left Ear: Ear canal and external ear normal.      Nose: Nose normal.      Mouth/Throat:      Mouth: Mucous membranes are moist.      Pharynx: Oropharynx is clear.   Eyes:      Extraocular Movements: Extraocular movements intact.      Conjunctiva/sclera: Conjunctivae normal.      Pupils: Pupils are equal, round, and reactive to light.   Cardiovascular:      Rate and Rhythm: Normal rate and regular rhythm.      Pulses: Normal pulses.      Heart sounds: Normal heart sounds.   Pulmonary:      Effort: Pulmonary effort is normal.      Breath sounds: Normal breath sounds.   Musculoskeletal:         General: Normal range of motion.      Cervical back: Normal range of motion and neck supple.   Skin:     General: Skin is warm and dry.      Capillary Refill: Capillary refill takes less than 2 seconds.   Neurological:      General: No focal deficit present.      Mental Status: She is alert and oriented to person, place, and time. Mental status is at baseline.   Psychiatric:         Mood and Affect: Mood normal.         Behavior: Behavior normal.         Thought Content: Thought content normal.         Judgment: Judgment normal.           DATA:  Results             Lab Review:     Lab Results   Component Value Date    WBC 6.0 11/12/2024    HGB 14.5 11/12/2024    HCT 44.1 11/12/2024    MCV 95.9 11/12/2024     11/12/2024      Lab Results   Component Value Date     11/12/2024    K 4.3 11/12/2024     11/12/2024    CO2 25 11/12/2024    BUN 13 11/12/2024    CREATININE 0.6 11/12/2024    GLUCOSE 103

## 2025-07-21 ENCOUNTER — CLINICAL SUPPORT (OUTPATIENT)
Dept: ALLERGY | Age: 80
End: 2025-07-21
Payer: MEDICARE

## 2025-07-21 VITALS
HEART RATE: 79 BPM | RESPIRATION RATE: 16 BRPM | SYSTOLIC BLOOD PRESSURE: 157 MMHG | OXYGEN SATURATION: 95 % | DIASTOLIC BLOOD PRESSURE: 84 MMHG

## 2025-07-21 DIAGNOSIS — J30.1 SEASONAL ALLERGIC RHINITIS DUE TO POLLEN: Primary | ICD-10-CM

## 2025-07-21 DIAGNOSIS — J30.9 CHRONIC ALLERGIC RHINITIS: ICD-10-CM

## 2025-07-21 PROCEDURE — 95117 IMMUNOTHERAPY INJECTIONS: CPT | Performed by: NURSE PRACTITIONER

## 2025-07-21 NOTE — PROGRESS NOTES
PATIENT HAS THE FOLLOWING DIAGNOSIS SUPPORTING ADMINISTRATION OF ALLERGY INJECTIONS: J30.1Allergic rhinitis due to pollen  AND [id number] MULTIPLE ALLERGY INJECTIONS FOR ALLERGY INJECTION ADMINISTRATION      Patient here for allergy injection today.  Patient was presented with the opportunity to speak with provider and ask questions regarding allergy injections.  Patient was also instructed they need to wait 30 minutes after receiving allergy injections. All risks associated with potential adverse effects have been explained to patient and patient handout was provided following allergy testing.    After consent obtained/verified, allergy injection subcutaneously given in back of arm(s).  Please see below for specific details of site injections, concentration of serum, and volume injected.    VIAL COLOR OF ALL VIALS TODAY IS yellow 1:10 concentration    ALLERGY INJECTION FROM VIAL A GIVEN left  UPPER ARM IN THE AMOUNT OF 0.40 ML    ALLERGY INJECTION FROM VIAL B GIVEN right lower ARM IN THE AMOUNT OF 0.40  ML    ALLERGY INJECTION FROM VIAL C GIVEN rightupper ARM IN THE AMOUNT OF 0.40 ML    Documentation of vial injection specific to arm(s) noted on Allergy Immunotherapy Administration Form.       Patient waited 30 minutes for observation.No  Patient has received information and verbalizes understanding of the potential  health risks associated with allergy injections . Patient understands risks including anaphylaxis and chooses not to wait 30 minutes after administration of allergy injection(s).    Patient tolerated well without adverse reaction while the patient was in the office.    SHOT REACTION TREATMENT INSTRUCTIONS    During the 30 minute wait after an allergy injection the following symptoms should be reported:    Itching other than at the injection site  Hives or swelling other than at the injection site  Redness other than at the injection site  Difficulty breathing  Chest tightness  Difficulty swallowing  Throat

## 2025-07-23 ENCOUNTER — CLINICAL SUPPORT (OUTPATIENT)
Dept: ALLERGY | Age: 80
End: 2025-07-23
Payer: MEDICARE

## 2025-07-23 VITALS — HEART RATE: 82 BPM | DIASTOLIC BLOOD PRESSURE: 86 MMHG | OXYGEN SATURATION: 94 % | SYSTOLIC BLOOD PRESSURE: 154 MMHG

## 2025-07-23 DIAGNOSIS — J30.9 CHRONIC ALLERGIC RHINITIS: Primary | ICD-10-CM

## 2025-07-23 DIAGNOSIS — J30.1 SEASONAL ALLERGIC RHINITIS DUE TO POLLEN: ICD-10-CM

## 2025-07-23 DIAGNOSIS — Z91.09 MITE ALLERGY: ICD-10-CM

## 2025-07-23 PROCEDURE — 95117 IMMUNOTHERAPY INJECTIONS: CPT | Performed by: NURSE PRACTITIONER

## 2025-07-23 NOTE — PROGRESS NOTES
PATIENT HAS THE FOLLOWING DIAGNOSIS SUPPORTING ADMINISTRATION OF ALLERGY INJECTIONS: J30.1Allergic rhinitis due to pollen  AND 36010 MULTIPLE ALLERGY INJECTIONS FOR ALLERGY INJECTION ADMINISTRATION      Patient here for allergy injection today.  Patient was presented with the opportunity to speak with provider and ask questions regarding allergy injections.  Patient was also instructed they need to wait 30 minutes after receiving allergy injections. All risks associated with potential adverse effects have been explained to patient and patient handout was provided following allergy testing.    After consent obtained/verified, allergy injection subcutaneously given in back of arm(s).  Please see below for specific details of site injections, concentration of serum, and volume injected.    VIAL COLOR OF ALL VIALS TODAY IS yellow 1:10 concentration    ALLERGY INJECTION FROM VIAL A GIVEN right  UPPER ARM IN THE AMOUNT OF 0.45 ML    ALLERGY INJECTION FROM VIAL B GIVEN left lower ARM IN THE AMOUNT OF 0.45  ML    ALLERGY INJECTION FROM VIAL C GIVEN leftupper ARM IN THE AMOUNT OF 0.45 ML    Documentation of vial injection specific to arm(s) noted on Allergy Immunotherapy Administration Form.       Patient waited 30 minutes for observation.No  Patient has received information and verbalizes understanding of the potential  health risks associated with allergy injections . Patient understands risks including anaphylaxis and chooses not to wait 30 minutes after administration of allergy injection(s).    Patient tolerated well without adverse reaction while the patient was in the office.    SHOT REACTION TREATMENT INSTRUCTIONS    During the 30 minute wait after an allergy injection the following symptoms should be reported:    Itching other than at the injection site  Hives or swelling other than at the injection site  Redness other than at the injection site  Difficulty breathing  Chest tightness  Difficulty swallowing  Throat

## 2025-07-29 ENCOUNTER — CLINICAL SUPPORT (OUTPATIENT)
Dept: ALLERGY | Age: 80
End: 2025-07-29
Payer: MEDICARE

## 2025-07-29 VITALS — SYSTOLIC BLOOD PRESSURE: 116 MMHG | DIASTOLIC BLOOD PRESSURE: 78 MMHG | OXYGEN SATURATION: 94 % | HEART RATE: 74 BPM

## 2025-07-29 DIAGNOSIS — J30.1 SEASONAL ALLERGIC RHINITIS DUE TO POLLEN: ICD-10-CM

## 2025-07-29 DIAGNOSIS — J30.9 CHRONIC ALLERGIC RHINITIS: Primary | ICD-10-CM

## 2025-07-29 PROCEDURE — 95117 IMMUNOTHERAPY INJECTIONS: CPT | Performed by: NURSE PRACTITIONER

## 2025-07-29 NOTE — PROGRESS NOTES
PATIENT HAS THE FOLLOWING DIAGNOSIS SUPPORTING ADMINISTRATION OF ALLERGY INJECTIONS: J30.1Allergic rhinitis due to pollen  AND [id number] MULTIPLE ALLERGY INJECTIONS FOR ALLERGY INJECTION ADMINISTRATION      Patient here for allergy injection today.  Patient was presented with the opportunity to speak with provider and ask questions regarding allergy injections.  Patient was also instructed they need to wait 30 minutes after receiving allergy injections. All risks associated with potential adverse effects have been explained to patient and patient handout was provided following allergy testing.    After consent obtained/verified, allergy injection subcutaneously given in back of arm(s).  Please see below for specific details of site injections, concentration of serum, and volume injected.    VIAL COLOR OF ALL VIALS TODAY IS yellow 1:10 concentration    ALLERGY INJECTION FROM VIAL A GIVEN left  UPPER ARM IN THE AMOUNT OF 0.50 ML    ALLERGY INJECTION FROM VIAL B GIVEN right upper ARM IN THE AMOUNT OF 0.50  ML    ALLERGY INJECTION FROM VIAL C GIVEN rightlower ARM IN THE AMOUNT OF 0.50 ML    Documentation of vial injection specific to arm(s) noted on Allergy Immunotherapy Administration Form.       Patient waited 30 minutes for observation.No  Patient has received information and verbalizes understanding of the potential  health risks associated with allergy injections . Patient understands risks including anaphylaxis and chooses not to wait 30 minutes after administration of allergy injection(s).    Patient tolerated well without adverse reaction while the patient was in the office.    SHOT REACTION TREATMENT INSTRUCTIONS    During the 30 minute wait after an allergy injection the following symptoms should be reported:    Itching other than at the injection site  Hives or swelling other than at the injection site  Redness other than at the injection site  Difficulty breathing  Chest tightness  Difficulty swallowing  Throat

## 2025-08-13 ENCOUNTER — CLINICAL SUPPORT (OUTPATIENT)
Dept: ALLERGY | Age: 80
End: 2025-08-13

## 2025-08-13 VITALS
OXYGEN SATURATION: 98 % | HEART RATE: 66 BPM | RESPIRATION RATE: 18 BRPM | DIASTOLIC BLOOD PRESSURE: 81 MMHG | SYSTOLIC BLOOD PRESSURE: 153 MMHG

## 2025-08-13 DIAGNOSIS — J30.1 SEASONAL ALLERGIC RHINITIS DUE TO POLLEN: ICD-10-CM

## 2025-08-13 DIAGNOSIS — J30.9 CHRONIC ALLERGIC RHINITIS: Primary | ICD-10-CM

## 2025-08-18 ENCOUNTER — CLINICAL SUPPORT (OUTPATIENT)
Dept: ALLERGY | Age: 80
End: 2025-08-18
Payer: MEDICARE

## 2025-08-18 VITALS
DIASTOLIC BLOOD PRESSURE: 84 MMHG | SYSTOLIC BLOOD PRESSURE: 134 MMHG | OXYGEN SATURATION: 98 % | RESPIRATION RATE: 18 BRPM | HEART RATE: 74 BPM

## 2025-08-18 DIAGNOSIS — J30.1 SEASONAL ALLERGIC RHINITIS DUE TO POLLEN: ICD-10-CM

## 2025-08-18 DIAGNOSIS — J30.9 CHRONIC ALLERGIC RHINITIS: Primary | ICD-10-CM

## 2025-08-18 PROCEDURE — 95117 IMMUNOTHERAPY INJECTIONS: CPT | Performed by: NURSE PRACTITIONER

## 2025-08-25 ENCOUNTER — CLINICAL SUPPORT (OUTPATIENT)
Dept: ALLERGY | Age: 80
End: 2025-08-25
Payer: MEDICARE

## 2025-08-25 VITALS
OXYGEN SATURATION: 96 % | DIASTOLIC BLOOD PRESSURE: 84 MMHG | RESPIRATION RATE: 19 BRPM | SYSTOLIC BLOOD PRESSURE: 132 MMHG | HEART RATE: 80 BPM

## 2025-08-25 DIAGNOSIS — J30.1 SEASONAL ALLERGIC RHINITIS DUE TO POLLEN: ICD-10-CM

## 2025-08-25 DIAGNOSIS — J30.9 CHRONIC ALLERGIC RHINITIS: Primary | ICD-10-CM

## 2025-08-25 PROCEDURE — 95117 IMMUNOTHERAPY INJECTIONS: CPT | Performed by: NURSE PRACTITIONER

## 2025-08-25 RX ORDER — SPIRONOLACTONE 50 MG/1
50 TABLET, FILM COATED ORAL PRN
COMMUNITY
Start: 2025-08-19

## 2025-09-04 ENCOUNTER — CLINICAL SUPPORT (OUTPATIENT)
Dept: ALLERGY | Age: 80
End: 2025-09-04
Payer: MEDICARE

## 2025-09-04 VITALS
DIASTOLIC BLOOD PRESSURE: 85 MMHG | HEART RATE: 80 BPM | SYSTOLIC BLOOD PRESSURE: 149 MMHG | OXYGEN SATURATION: 98 % | RESPIRATION RATE: 20 BRPM

## 2025-09-04 DIAGNOSIS — J30.1 SEASONAL ALLERGIC RHINITIS DUE TO POLLEN: ICD-10-CM

## 2025-09-04 DIAGNOSIS — J30.9 CHRONIC ALLERGIC RHINITIS: Primary | ICD-10-CM

## 2025-09-04 PROCEDURE — 95117 IMMUNOTHERAPY INJECTIONS: CPT | Performed by: NURSE PRACTITIONER

## (undated) DEVICE — PACK-MAJOR

## (undated) DEVICE — CODMAN® SURGICAL PATTIES 1/2" X 3" (1.27CM X 7.62CM): Brand: CODMAN®

## (undated) DEVICE — ENT: Brand: MEDLINE INDUSTRIES, INC.

## (undated) DEVICE — IV START KIT: Brand: MEDLINE INDUSTRIES, INC.

## (undated) DEVICE — BLADE 1884012EM RAD12 4MM M4 ROTATE ROHS: Brand: FUSION®

## (undated) DEVICE — TUBING 1895522 5PK STRAIGHTSHOT TO XPS: Brand: STRAIGHTSHOT®

## (undated) DEVICE — CATHETER ETER IV 22GA L1IN POLYUR STR RADPQ INTROCAN SFTY

## (undated) DEVICE — SUTURE MCRYL SZ 4 0 L18IN ABSRB UD P 3 3 8 CIR REV CUT NDL MCP494G

## (undated) DEVICE — SOLUTION IV 1000ML 0.45% SOD CHL PH 5 INJ USP VIAFLX PLAS

## (undated) DEVICE — ENDO KIT: Brand: MEDLINE INDUSTRIES, INC.

## (undated) DEVICE — INSTRUMENT TRACKER 9733533XOM ENT 1PK

## (undated) DEVICE — CANISTER, RIGID, 2000CC: Brand: MEDLINE INDUSTRIES, INC.

## (undated) DEVICE — SILICONE DUAL LUMEN STOMACH TUBE,ANTI-REFLUX VALVE AND RADIOPAQUE LINE: Brand: SALEM SUMP

## (undated) DEVICE — AGENT HEMOSTATIC SURGIFLOW MATRIX KIT W/THROMBIN

## (undated) DEVICE — Device

## (undated) DEVICE — APPLICATOR MEDICATED 10.5 CC SOLUTION CLR STRL CHLORAPREP

## (undated) DEVICE — COAGULATOR SUCT 8FR L6IN HNDL FOR ENT PROC

## (undated) DEVICE — MICRO TIP WIPE: Brand: DEVON

## (undated) DEVICE — GOWN,SIRUS,NON REINFRCD,LARGE,SET IN SL: Brand: MEDLINE

## (undated) DEVICE — BLADE 1884012 RAD12 5PK CURVED 4MM: Brand: RAD®

## (undated) DEVICE — PATIENT TRACKER 9734887XOM NON-INVASIVE

## (undated) DEVICE — GLOVE SURG SZ 75 L12IN FNGR THK94MIL TRNSLUC YEL LTX

## (undated) DEVICE — 1010 S-DRAPE TOWEL DRAPE 10/BX: Brand: STERI-DRAPE™

## (undated) DEVICE — SYRINGE IRRIG 60ML SFT PLIABLE BLB EZ TO GRP 1 HND USE W/

## (undated) DEVICE — SPONGE GZ W4XL4IN COT 12 PLY TYP VII WVN C FLD DSGN STERILE

## (undated) DEVICE — GLOVE SURG SZ 7.5 L11.73IN FNGR THK9.8MIL STRW LTX POLYMER

## (undated) DEVICE — DEFENDO AIR WATER SUCTION AND BIOPSY VALVE KIT FOR  OLYMPUS: Brand: DEFENDO AIR/WATER/SUCTION AND BIOPSY VALVE

## (undated) DEVICE — BASIC SINGLE BASIN BTC-LF: Brand: MEDLINE INDUSTRIES, INC.

## (undated) DEVICE — PAD,NON-ADHERENT,3X8,STERILE,LF,1/PK: Brand: MEDLINE

## (undated) DEVICE — SET ADMIN 25ML L117IN PMP MOD CK VLV RLER CLMP 2 SMRTSITE